# Patient Record
Sex: FEMALE | Race: WHITE | Employment: OTHER | URBAN - METROPOLITAN AREA
[De-identification: names, ages, dates, MRNs, and addresses within clinical notes are randomized per-mention and may not be internally consistent; named-entity substitution may affect disease eponyms.]

---

## 2022-03-22 ENCOUNTER — TRANSCRIBE ORDER (OUTPATIENT)
Dept: SCHEDULING | Age: 87
End: 2022-03-22

## 2022-03-22 DIAGNOSIS — R19.00 ABDOMINAL MASS: Primary | ICD-10-CM

## 2022-03-29 ENCOUNTER — HOSPITAL ENCOUNTER (OUTPATIENT)
Dept: PET IMAGING | Age: 87
Discharge: HOME | End: 2022-03-29
Payer: MEDICARE

## 2022-03-29 DIAGNOSIS — R19.00 ABDOMINAL MASS: ICD-10-CM

## 2022-03-29 LAB
GLUCOSE BLD STRIP.AUTO-MCNC: 93 MG/DL (ref 65–100)
SERVICE CMNT-IMP: NORMAL

## 2022-03-29 PROCEDURE — A9552 F18 FDG: HCPCS

## 2022-03-29 PROCEDURE — 82962 GLUCOSE BLOOD TEST: CPT

## 2022-03-29 PROCEDURE — 74011000636 HC RX REV CODE- 636: Performed by: INTERNAL MEDICINE

## 2022-03-29 RX ORDER — SODIUM CHLORIDE 0.9 % (FLUSH) 0.9 %
10 SYRINGE (ML) INJECTION
Status: COMPLETED | OUTPATIENT
Start: 2022-03-29 | End: 2022-03-29

## 2022-03-29 RX ORDER — FLUDEOXYGLUCOSE F18 300 MCI/ML
10 INJECTION INTRAVENOUS ONCE
Status: COMPLETED | OUTPATIENT
Start: 2022-03-29 | End: 2022-03-29

## 2022-03-29 RX ADMIN — Medication 10 ML: at 13:36

## 2022-03-29 RX ADMIN — DIATRIZOATE MEGLUMINE AND DIATRIZOATE SODIUM 10 ML: 660; 100 LIQUID ORAL; RECTAL at 13:36

## 2022-03-29 RX ADMIN — FLUDEOXYGLUCOSE F18 14.44 MILLICURIE: 300 INJECTION INTRAVENOUS at 13:36

## 2022-04-12 ENCOUNTER — HOSPITAL ENCOUNTER (OUTPATIENT)
Dept: LAB | Age: 87
Discharge: HOME | End: 2022-04-12
Payer: MEDICARE

## 2022-04-12 DIAGNOSIS — R19.00 ABDOMINAL MASS, UNSPECIFIED ABDOMINAL LOCATION: ICD-10-CM

## 2022-04-12 LAB
ALBUMIN SERPL-MCNC: 2.3 G/DL (ref 3.2–4.6)
ALBUMIN/GLOB SERPL: 0.3 {RATIO} (ref 1.2–3.5)
ALP SERPL-CCNC: 139 U/L (ref 50–136)
ALT SERPL-CCNC: 20 U/L (ref 12–65)
ANION GAP SERPL CALC-SCNC: 6 MMOL/L (ref 7–16)
AST SERPL-CCNC: 18 U/L (ref 15–37)
BASOPHILS # BLD: 0.1 K/UL (ref 0–0.2)
BASOPHILS NFR BLD: 0 % (ref 0–2)
BILIRUB SERPL-MCNC: 0.4 MG/DL (ref 0.2–1.1)
BUN SERPL-MCNC: 41 MG/DL (ref 8–23)
CALCIUM SERPL-MCNC: 10.6 MG/DL (ref 8.3–10.4)
CHLORIDE SERPL-SCNC: 102 MMOL/L (ref 98–107)
CO2 SERPL-SCNC: 23 MMOL/L (ref 21–32)
CREAT SERPL-MCNC: 1.9 MG/DL (ref 0.6–1)
CRP SERPL-MCNC: 16.4 MG/DL (ref 0–0.9)
DIFFERENTIAL METHOD BLD: ABNORMAL
EOSINOPHIL # BLD: 0 K/UL (ref 0–0.8)
EOSINOPHIL NFR BLD: 0 % (ref 0.5–7.8)
ERYTHROCYTE [DISTWIDTH] IN BLOOD BY AUTOMATED COUNT: 15.4 % (ref 11.9–14.6)
ERYTHROCYTE [SEDIMENTATION RATE] IN BLOOD: 60 MM/HR (ref 0–30)
GLOBULIN SER CALC-MCNC: 7 G/DL (ref 2.3–3.5)
GLUCOSE SERPL-MCNC: 108 MG/DL (ref 65–100)
HCT VFR BLD AUTO: 35.8 % (ref 35.8–46.3)
HGB BLD-MCNC: 11.2 G/DL (ref 11.7–15.4)
IMM GRANULOCYTES # BLD AUTO: 0.3 K/UL (ref 0–0.5)
IMM GRANULOCYTES NFR BLD AUTO: 2 % (ref 0–5)
LYMPHOCYTES # BLD: 3.2 K/UL (ref 0.5–4.6)
LYMPHOCYTES NFR BLD: 16 % (ref 13–44)
MCH RBC QN AUTO: 28.2 PG (ref 26.1–32.9)
MCHC RBC AUTO-ENTMCNC: 31.3 G/DL (ref 31.4–35)
MCV RBC AUTO: 90.2 FL (ref 79.6–97.8)
MONOCYTES # BLD: 1.5 K/UL (ref 0.1–1.3)
MONOCYTES NFR BLD: 7 % (ref 4–12)
NEUTS SEG # BLD: 15.3 K/UL (ref 1.7–8.2)
NEUTS SEG NFR BLD: 75 % (ref 43–78)
NRBC # BLD: 0 K/UL (ref 0–0.2)
PLATELET # BLD AUTO: 437 K/UL (ref 150–450)
PMV BLD AUTO: 8.8 FL (ref 9.4–12.3)
POTASSIUM SERPL-SCNC: 4.6 MMOL/L (ref 3.5–5.1)
PROT SERPL-MCNC: 9.3 G/DL (ref 6.3–8.2)
RBC # BLD AUTO: 3.97 M/UL (ref 4.05–5.2)
SODIUM SERPL-SCNC: 131 MMOL/L (ref 136–145)
WBC # BLD AUTO: 20.3 K/UL (ref 4.3–11.1)

## 2022-04-12 PROCEDURE — 80053 COMPREHEN METABOLIC PANEL: CPT

## 2022-04-12 PROCEDURE — 85025 COMPLETE CBC W/AUTO DIFF WBC: CPT

## 2022-04-12 PROCEDURE — 36415 COLL VENOUS BLD VENIPUNCTURE: CPT

## 2022-04-12 PROCEDURE — 86140 C-REACTIVE PROTEIN: CPT

## 2022-04-12 PROCEDURE — 85652 RBC SED RATE AUTOMATED: CPT

## 2022-04-14 LAB
FLOW CYTOMETRY, FBTC1: NORMAL
SPECIMEN SOURCE: NORMAL
TEST ORDERED:: NORMAL

## 2022-04-28 ENCOUNTER — HOSPITAL ENCOUNTER (OUTPATIENT)
Dept: CT IMAGING | Age: 87
Discharge: HOME | End: 2022-04-28
Attending: INTERNAL MEDICINE
Payer: MEDICARE

## 2022-04-28 ENCOUNTER — HOSPITAL ENCOUNTER (OUTPATIENT)
Dept: ULTRASOUND IMAGING | Age: 87
Discharge: HOME | End: 2022-04-28
Attending: INTERNAL MEDICINE
Payer: MEDICARE

## 2022-04-28 VITALS
SYSTOLIC BLOOD PRESSURE: 112 MMHG | DIASTOLIC BLOOD PRESSURE: 53 MMHG | BODY MASS INDEX: 30.48 KG/M2 | WEIGHT: 172 LBS | HEIGHT: 63 IN | OXYGEN SATURATION: 94 % | RESPIRATION RATE: 16 BRPM | HEART RATE: 70 BPM

## 2022-04-28 VITALS
OXYGEN SATURATION: 96 % | SYSTOLIC BLOOD PRESSURE: 123 MMHG | HEART RATE: 86 BPM | DIASTOLIC BLOOD PRESSURE: 56 MMHG | RESPIRATION RATE: 18 BRPM | TEMPERATURE: 97.9 F

## 2022-04-28 DIAGNOSIS — R19.00 ABDOMINAL MASS, UNSPECIFIED ABDOMINAL LOCATION: ICD-10-CM

## 2022-04-28 LAB
GLUCOSE BLD STRIP.AUTO-MCNC: 130 MG/DL (ref 65–100)
SERVICE CMNT-IMP: ABNORMAL

## 2022-04-28 PROCEDURE — 82962 GLUCOSE BLOOD TEST: CPT

## 2022-04-28 PROCEDURE — 88185 FLOWCYTOMETRY/TC ADD-ON: CPT

## 2022-04-28 PROCEDURE — 88307 TISSUE EXAM BY PATHOLOGIST: CPT

## 2022-04-28 PROCEDURE — 74011250636 HC RX REV CODE- 250/636: Performed by: RADIOLOGY

## 2022-04-28 PROCEDURE — 99152 MOD SED SAME PHYS/QHP 5/>YRS: CPT

## 2022-04-28 PROCEDURE — 74011000250 HC RX REV CODE- 250: Performed by: RADIOLOGY

## 2022-04-28 PROCEDURE — 88184 FLOWCYTOMETRY/ TC 1 MARKER: CPT

## 2022-04-28 PROCEDURE — 49180 BIOPSY ABDOMINAL MASS: CPT

## 2022-04-28 RX ORDER — MIDAZOLAM HYDROCHLORIDE 1 MG/ML
.5-2 INJECTION, SOLUTION INTRAMUSCULAR; INTRAVENOUS
Status: DISCONTINUED | OUTPATIENT
Start: 2022-04-28 | End: 2022-05-02 | Stop reason: HOSPADM

## 2022-04-28 RX ORDER — SODIUM CHLORIDE 9 MG/ML
25 INJECTION, SOLUTION INTRAVENOUS ONCE
Status: COMPLETED | OUTPATIENT
Start: 2022-04-28 | End: 2022-04-28

## 2022-04-28 RX ORDER — LIDOCAINE HYDROCHLORIDE 20 MG/ML
1-10 INJECTION, SOLUTION INFILTRATION; PERINEURAL
Status: DISCONTINUED | OUTPATIENT
Start: 2022-04-28 | End: 2022-05-02 | Stop reason: HOSPADM

## 2022-04-28 RX ORDER — FENTANYL CITRATE 50 UG/ML
25-100 INJECTION, SOLUTION INTRAMUSCULAR; INTRAVENOUS
Status: DISCONTINUED | OUTPATIENT
Start: 2022-04-28 | End: 2022-05-02 | Stop reason: HOSPADM

## 2022-04-28 RX ADMIN — FENTANYL CITRATE 25 MCG: 50 INJECTION INTRAMUSCULAR; INTRAVENOUS at 09:45

## 2022-04-28 RX ADMIN — MIDAZOLAM 0.5 MG: 1 INJECTION INTRAMUSCULAR; INTRAVENOUS at 09:45

## 2022-04-28 RX ADMIN — SODIUM CHLORIDE 25 ML/HR: 9 INJECTION, SOLUTION INTRAVENOUS at 09:33

## 2022-04-28 RX ADMIN — MIDAZOLAM 0.5 MG: 1 INJECTION INTRAMUSCULAR; INTRAVENOUS at 09:33

## 2022-04-28 RX ADMIN — FENTANYL CITRATE 25 MCG: 50 INJECTION INTRAMUSCULAR; INTRAVENOUS at 09:39

## 2022-04-28 RX ADMIN — LIDOCAINE HYDROCHLORIDE 200 MG: 20 INJECTION, SOLUTION INFILTRATION; PERINEURAL at 09:46

## 2022-04-28 RX ADMIN — FENTANYL CITRATE 25 MCG: 50 INJECTION INTRAMUSCULAR; INTRAVENOUS at 09:33

## 2022-04-28 RX ADMIN — MIDAZOLAM 0.5 MG: 1 INJECTION INTRAMUSCULAR; INTRAVENOUS at 09:39

## 2022-04-28 NOTE — H&P
History and Physical    Patient: Haven Vila MRN: [id number]  SSN: [id number]    YOB: 1935  Age: 82 y.o. Sex: female      Subjective:      Haven Vila is a 82 y.o. female who has multiple abdominal masses. Needs biopsy for diagnosis. NPO . Past Medical History:   Diagnosis Date    [facility]chronic obstructive [facility]) [facility] [address]     Depression     Diabetes [facility]     Hypertension      Past Surgical History:   Procedure Laterality Date    HX ANKLE FRACTURE TX      ankle broken      Family History   Problem Relation Age of Onset    Colon Cancer Sister     Cancer Sister         Bladder    Cancer Brother         bladder     Social History     Tobacco Use    Smoking status: Former Smoker    Smokeless tobacco: Never Used   Substance Use Topics    Alcohol use: Never      Prior to Admission medications    Medication Sig Start Date End Date Taking? Authorizing Provider   magnesium 250 mg tab Take 1 Tablet by mouth daily. Provider, Historical   Ventolin HFA 90 mcg/actuation inhaler INHALE 2 PUFFS BY MOUTH EVERY 6 HOURS AS NEEDED 3/16/22   Provider, Historical   aspirin 81 mg chewable tablet Take 81 mg by mouth daily. 2/15/22 2/15/23  Provider, Historical   calcium-vitamin D 600 mg-5 mcg (200 unit) tab Take 2 Tablets by mouth daily. Provider, Historical   cyanocobalamin (VITAMIN B12) 500 mcg tablet Take 500 mcg by mouth daily. Provider, Historical   docosahexanoic acid-eicosapent 120-180 mg cap Take 1 Capsule by mouth daily. Provider, Historical   glimepiride (AMARYL) 1 mg tablet Take 1 mg by mouth daily. 11/16/21 11/16/22  Provider, Historical   ibuprofen (MOTRIN) 200 mg tablet Take 3 Tablets by mouth as needed. Provider, Historical   lisinopril-hydroCHLOROthiazide (PRINZIDE, ZESTORETIC) 10-12.5 mg per tablet Take 1 Tablet by mouth daily.  11/16/21 11/16/22  Provider, Historical   Anoro Ellipta 62.5-25 mcg/actuation inhaler INHALE 1 PUFF BY MOUTH EVERY DAY 3/15/22 Provider, Historical   LORazepam (ATIVAN) 0.5 mg tablet Take 1 Tablet by mouth every eight (8) hours as needed for Anxiety. Max Daily Amount: 1.5 mg. 4/12/22   Anushka Robison MD        Allergies   Allergen Reactions    Metformin Other (comments)     GI issues    Penicillins Swelling       Review of Systems:  Pertinent items are noted in the History of Present Illness. Objective:     Vitals:    04/21/22 1226 04/28/22 0839   BP:  (!) 145/72   Pulse:  89   Resp:  16   Temp:  97.9 °F (36.6 °C)   SpO2: (S) (!) 2% 98%        Physical Exam:  LUNG: clear to auscultation bilaterally  HEART: regular rate and rhythm    Assessment:     Hospital Problems  Date Reviewed: 4/22/2022    None          Plan:     Image guided abdominal mass biopsy. The previously described pelvic lesion, which may contain gas is not amenable to percutaneous biopsy. Moderate sedation.     Signed By: Nilesh Rahman MD     April 28, 2022

## 2022-04-28 NOTE — PROCEDURES
Department of Interventional Radiology  [phone]        Interventional Radiology Brief Procedure Note    Patient: Saul Hutchins MRN: [id number]  SSN: [id number]    YOB: 1935  Age: 82 y.o. Sex: female      Date of Procedure: 4/28/2022    Pre-Procedure Diagnosis: abdominal mass    Post-Procedure Diagnosis: SAME    Procedure(s): Image Guided Biopsy    Brief Description of Procedure: as above    Performed By: Dia Monroy MD     Assistants: None    Anesthesia:Moderate Sedation    Estimated Blood Loss: Less than 10ml    Specimens:  Pathology    Implants:  None    Findings: upper abdominal mass    Complications: None    Recommendations: routine post care     Follow Up: as needed.   The pelvic gas and fluid collection is not accessible for biopsy or aspiration    Signed By: Dia Monroy MD     April 28, 2022

## 2022-04-28 NOTE — PROGRESS NOTES
TRANSFER - OUT REPORT:    Verbal report given to Latisha Knowles RN(name) on Haven Vila  being transferred to IR Recovery 5(unit) for routine post - op       Report consisted of patients Situation, Background, Assessment and   Recommendations(SBAR). Information from the following report(s) SBAR, Procedure Summary and MAR was reviewed with the receiving nurse. Opportunity for questions and clarification was provided. Conscious Sedation:   75 Mcg of Fentanyl administered  1.5 Mg of Versed administered    Pt tolerated procedure well.      Visit Vitals  BP (!) 123/56   Pulse 86   Temp 97.9 °F (36.6 °C)   Resp 18   SpO2 96%     Past Medical History:   Diagnosis Date    [facility]chronic obstructive [facility] disease) ([facility] [address] [facility]     Depression     Diabetes ([facility] [address] [facility]     Hypertension      Peripheral IV 04/28/22 Right Antecubital (Active)

## 2022-04-28 NOTE — DISCHARGE INSTRUCTIONS
[address]  Department of Interventional Radiology  [facility]  [phone] Office  [phone] Fax    BIOPSY DISCHARGE INSTRUCTIONS    General Instructions:     A biopsy is the removal of a small piece of tissue for microscopic examination or testing. Healthy tissue can be obtained for the purpose of tissue-type matching for transplants. Unhealthy tissues are more commonly biopsied to diagnose disease. Lung Biopsy:     A needle lung biopsy is performed when there is a mass discovered in the lung area. The most serious risk is infection, bleeding, and pneumothorax (a collapsed lung). Signs of pneumothorax include shortness of breath, rapid heart rate, and blueness of the skin. If any of these occur, call 911. Liver Biopsy: This test helps detect cancer, infections, and the cause of an enlargement of the liver or elevated liver enzymes. It also helps to diagnose a number of liver diseases. The pain associated with the procedure may be felt in the shoulder. The risks include internal bleeding, pneumothorax, and injury to the surrounding organs. Renal Biopsy: This procedure is sometimes done to evaluate a transplanted kidney. It is also used to evaluate unexplained decrease in kidney function, blood, or protein in the urine. You may see bright red blood in the urine the first 24 hours after the test. If the bleeding lasts longer, you need to call your doctor. There is a risk of infection and bleeding into the muscle, which may cause soreness. Spinal Biopsy: This test is sometimes done in conjunction with other procedures. Your back will be sore, as we are taking a small sample of bone, which is slightly more difficult to sample than tissue. General Biopsy:     A mass can grow in any area of the body, and we are taking a specimen as ordered by your doctor. The risks are the same.  They include bleeding, pain, and infection. Home Care Instructions: You may resume your regular diet and medication regimen. Do not drink alcohol, drive, or make any important legal decisions in the next 24 hours. Do not lift anything heavier than a gallon of milk until the soreness goes away. You may use over the counter acetaminophen or ibuprofen for the soreness. You may apply an ice pack to the affected area for 20-30 minutes at time for the first 24 hours. After that, you may apply a heat pack. Call If: You should call your Physician and/or the Radiology Nurse if you have any questions or concerns about the biopsy site. Call if you should have increased pain, fever, redness, drainage, or bleeding more than a small spot on the bandage. Follow-Up Instructions: Please see your ordering doctor as he/she has requested. To Reach Us: If you have any questions about your procedure, please call the Interventional Radiology department at [phone]. After business hours (5pm) and weekends, call the answering service at [phone] and ask for the Radiologist on call to be paged. Si tiene Preguntas acerca del procedimiento, por favor llame al departamento de Radiología Intervencional al [phone]. Después de horas de oficina (5 pm) y los fines de [place], llamar al [facility] al [phone] y pregunte por el Radiologo de [facility]. Interventional Radiology General Nurse Discharge    After general anesthesia or intravenous sedation, for 24 hours or while taking prescription Narcotics:  · Limit your activities  · Do not drive and operate hazardous machinery  · Do not make important personal or business decisions  · Do  not drink alcoholic beverages  · If you have not urinated within 8 hours after discharge, please contact your surgeon on call. * Please give a list of your current medications to your Primary Care Provider.   * Please update this list whenever your medications are discontinued, doses are changed, or new medications (including over-the-counter products) are added. * Please carry medication information at all times in case of emergency situations. These are general instructions for a healthy lifestyle:    No smoking/ No tobacco products/ Avoid exposure to second hand smoke  Surgeon General's Warning:  Quitting smoking now greatly reduces serious risk to your health. Obesity, smoking, and sedentary lifestyle greatly increases your risk for illness  A healthy diet, regular physical exercise & weight monitoring are important for maintaining a healthy lifestyle    You may be retaining fluid if you have a history of heart failure or if you experience any of the following symptoms:  Weight gain of 3 pounds or more overnight or 5 pounds in a week, increased swelling in our hands or feet or shortness of breath while lying flat in bed. Please call your doctor as soon as you notice any of these symptoms; do not wait until your next office visit. Recognize signs and symptoms of STROKE:  F-face looks uneven    A-arms unable to move or move unevenly    S-speech slurred or non-existent    T-time-call 911 as soon as signs and symptoms begin-DO NOT go       Back to bed or wait to see if you get better-TIME IS BRAIN.

## 2022-05-03 ENCOUNTER — HOSPITAL ENCOUNTER (OUTPATIENT)
Dept: LAB | Age: 87
Discharge: HOME | End: 2022-05-03
Payer: MEDICARE

## 2022-05-03 DIAGNOSIS — R19.00 PELVIC MASS IN FEMALE: ICD-10-CM

## 2022-05-03 LAB
ALBUMIN SERPL-MCNC: 3 G/DL (ref 3.2–4.6)
ALBUMIN/GLOB SERPL: 0.5 {RATIO} (ref 1.2–3.5)
ALP SERPL-CCNC: 106 U/L (ref 50–136)
ALT SERPL-CCNC: 25 U/L (ref 12–65)
ANION GAP SERPL CALC-SCNC: 4 MMOL/L (ref 7–16)
AST SERPL-CCNC: 27 U/L (ref 15–37)
BASOPHILS # BLD: 0.1 K/UL (ref 0–0.2)
BASOPHILS NFR BLD: 1 % (ref 0–2)
BILIRUB SERPL-MCNC: 0.5 MG/DL (ref 0.2–1.1)
BUN SERPL-MCNC: 28 MG/DL (ref 8–23)
CALCIUM SERPL-MCNC: 9.5 MG/DL (ref 8.3–10.4)
CHLORIDE SERPL-SCNC: 98 MMOL/L (ref 98–107)
CO2 SERPL-SCNC: 29 MMOL/L (ref 21–32)
CREAT SERPL-MCNC: 1.4 MG/DL (ref 0.6–1)
DIFFERENTIAL METHOD BLD: ABNORMAL
EOSINOPHIL # BLD: 0.3 K/UL (ref 0–0.8)
EOSINOPHIL NFR BLD: 3 % (ref 0.5–7.8)
ERYTHROCYTE [DISTWIDTH] IN BLOOD BY AUTOMATED COUNT: 17.2 % (ref 11.9–14.6)
GLOBULIN SER CALC-MCNC: 6.4 G/DL (ref 2.3–3.5)
GLUCOSE SERPL-MCNC: 77 MG/DL (ref 65–100)
HCT VFR BLD AUTO: 36.6 % (ref 35.8–46.3)
HGB BLD-MCNC: 11.4 G/DL (ref 11.7–15.4)
IMM GRANULOCYTES # BLD AUTO: 0.1 K/UL (ref 0–0.5)
IMM GRANULOCYTES NFR BLD AUTO: 1 % (ref 0–5)
LYMPHOCYTES # BLD: 3.2 K/UL (ref 0.5–4.6)
LYMPHOCYTES NFR BLD: 35 % (ref 13–44)
MCH RBC QN AUTO: 28.9 PG (ref 26.1–32.9)
MCHC RBC AUTO-ENTMCNC: 31.1 G/DL (ref 31.4–35)
MCV RBC AUTO: 92.9 FL (ref 79.6–97.8)
MONOCYTES # BLD: 0.9 K/UL (ref 0.1–1.3)
MONOCYTES NFR BLD: 10 % (ref 4–12)
NEUTS SEG # BLD: 4.8 K/UL (ref 1.7–8.2)
NEUTS SEG NFR BLD: 50 % (ref 43–78)
NRBC # BLD: 0 K/UL (ref 0–0.2)
PLATELET # BLD AUTO: 284 K/UL (ref 150–450)
PMV BLD AUTO: 8.6 FL (ref 9.4–12.3)
POTASSIUM SERPL-SCNC: 4.3 MMOL/L (ref 3.5–5.1)
PROT SERPL-MCNC: 9.4 G/DL (ref 6.3–8.2)
RBC # BLD AUTO: 3.94 M/UL (ref 4.05–5.2)
SODIUM SERPL-SCNC: 131 MMOL/L (ref 136–145)
WBC # BLD AUTO: 9.2 K/UL (ref 4.3–11.1)

## 2022-05-03 PROCEDURE — 80053 COMPREHEN METABOLIC PANEL: CPT

## 2022-05-03 PROCEDURE — 85025 COMPLETE CBC W/AUTO DIFF WBC: CPT

## 2022-05-03 PROCEDURE — 36415 COLL VENOUS BLD VENIPUNCTURE: CPT

## 2022-05-04 ENCOUNTER — HOSPITAL ENCOUNTER (OUTPATIENT)
Dept: LAB | Age: 87
Discharge: HOME | End: 2022-05-04
Payer: MEDICARE

## 2022-05-04 DIAGNOSIS — D68.8 OTHER SPECIFIED COAGULATION DEFECTS (HCC): ICD-10-CM

## 2022-05-04 DIAGNOSIS — Z01.812 PRE-PROCEDURE LAB EXAM: ICD-10-CM

## 2022-05-04 PROBLEM — C54.9: Status: ACTIVE | Noted: 2022-05-04

## 2022-05-04 LAB
APTT PPP: 34.6 SEC (ref 24.1–35.1)
FLOW CYTOMETRY, FBTC1: NORMAL
INR PPP: 1
PROTHROMBIN TIME: 13.5 SEC (ref 12.6–14.5)
SPECIMEN SOURCE: NORMAL
TEST ORDERED:: NORMAL

## 2022-05-04 PROCEDURE — 85730 THROMBOPLASTIN TIME PARTIAL: CPT

## 2022-05-04 PROCEDURE — 85610 PROTHROMBIN TIME: CPT

## 2022-05-04 PROCEDURE — 36415 COLL VENOUS BLD VENIPUNCTURE: CPT

## 2022-05-10 ENCOUNTER — ANESTHESIA EVENT (OUTPATIENT)
Dept: INTERVENTIONAL RADIOLOGY/VASCULAR | Age: 87
End: 2022-05-10
Payer: MEDICARE

## 2022-05-10 RX ORDER — ONDANSETRON 2 MG/ML
4 INJECTION INTRAMUSCULAR; INTRAVENOUS
Status: CANCELLED | OUTPATIENT
Start: 2022-05-10 | End: 2022-05-11

## 2022-05-10 RX ORDER — SODIUM CHLORIDE, SODIUM LACTATE, POTASSIUM CHLORIDE, CALCIUM CHLORIDE 600; 310; 30; 20 MG/100ML; MG/100ML; MG/100ML; MG/100ML
100 INJECTION, SOLUTION INTRAVENOUS CONTINUOUS
Status: CANCELLED | OUTPATIENT
Start: 2022-05-10 | End: 2022-05-11

## 2022-05-10 RX ORDER — LIDOCAINE HYDROCHLORIDE 10 MG/ML
0.1 INJECTION INFILTRATION; PERINEURAL AS NEEDED
Status: CANCELLED | OUTPATIENT
Start: 2022-05-10

## 2022-05-10 RX ORDER — SODIUM CHLORIDE 0.9 % (FLUSH) 0.9 %
5-40 SYRINGE (ML) INJECTION EVERY 8 HOURS
Status: CANCELLED | OUTPATIENT
Start: 2022-05-10

## 2022-05-10 RX ORDER — OXYCODONE HYDROCHLORIDE 5 MG/1
5 TABLET ORAL
Status: CANCELLED | OUTPATIENT
Start: 2022-05-10

## 2022-05-10 RX ORDER — FLUMAZENIL 0.1 MG/ML
0.2 INJECTION INTRAVENOUS
Status: CANCELLED | OUTPATIENT
Start: 2022-05-10

## 2022-05-10 RX ORDER — SODIUM CHLORIDE 0.9 % (FLUSH) 0.9 %
5-40 SYRINGE (ML) INJECTION AS NEEDED
Status: CANCELLED | OUTPATIENT
Start: 2022-05-10

## 2022-05-10 RX ORDER — HYDROMORPHONE HYDROCHLORIDE 2 MG/ML
0.5 INJECTION, SOLUTION INTRAMUSCULAR; INTRAVENOUS; SUBCUTANEOUS
Status: CANCELLED | OUTPATIENT
Start: 2022-05-10

## 2022-05-10 RX ORDER — TRISODIUM CITRATE DIHYDRATE AND CITRIC ACID MONOHYDRATE 500; 334 MG/5ML; MG/5ML
30 SOLUTION ORAL
Status: CANCELLED | OUTPATIENT
Start: 2022-05-10 | End: 2022-05-11

## 2022-05-10 RX ORDER — NALOXONE HYDROCHLORIDE 0.4 MG/ML
0.2 INJECTION, SOLUTION INTRAMUSCULAR; INTRAVENOUS; SUBCUTANEOUS AS NEEDED
Status: CANCELLED | OUTPATIENT
Start: 2022-05-10

## 2022-05-10 RX ORDER — MIDAZOLAM HYDROCHLORIDE 1 MG/ML
2 INJECTION, SOLUTION INTRAMUSCULAR; INTRAVENOUS
Status: CANCELLED | OUTPATIENT
Start: 2022-05-10 | End: 2022-05-11

## 2022-05-10 RX ORDER — SODIUM CHLORIDE, SODIUM LACTATE, POTASSIUM CHLORIDE, CALCIUM CHLORIDE 600; 310; 30; 20 MG/100ML; MG/100ML; MG/100ML; MG/100ML
75 INJECTION, SOLUTION INTRAVENOUS CONTINUOUS
Status: CANCELLED | OUTPATIENT
Start: 2022-05-10

## 2022-05-10 RX ORDER — ACETAMINOPHEN 325 MG/1
650 TABLET ORAL ONCE
Status: CANCELLED | OUTPATIENT
Start: 2022-05-10 | End: 2022-05-10

## 2022-05-10 RX ORDER — ALBUTEROL SULFATE 0.83 MG/ML
2.5 SOLUTION RESPIRATORY (INHALATION)
Status: CANCELLED | OUTPATIENT
Start: 2022-05-10

## 2022-05-11 ENCOUNTER — HOSPITAL ENCOUNTER (OUTPATIENT)
Dept: INTERVENTIONAL RADIOLOGY/VASCULAR | Age: 87
Discharge: HOME | End: 2022-05-11
Attending: OBSTETRICS & GYNECOLOGY
Payer: MEDICARE

## 2022-05-11 ENCOUNTER — ANESTHESIA (OUTPATIENT)
Dept: INTERVENTIONAL RADIOLOGY/VASCULAR | Age: 87
End: 2022-05-11
Payer: MEDICARE

## 2022-05-11 VITALS
WEIGHT: 173.5 LBS | RESPIRATION RATE: 16 BRPM | OXYGEN SATURATION: 98 % | DIASTOLIC BLOOD PRESSURE: 57 MMHG | TEMPERATURE: 98 F | BODY MASS INDEX: 30.74 KG/M2 | SYSTOLIC BLOOD PRESSURE: 142 MMHG | HEART RATE: 86 BPM | HEIGHT: 63 IN

## 2022-05-11 DIAGNOSIS — C54.9 MIXED MULLERIAN TUMOR (HCC): ICD-10-CM

## 2022-05-11 PROCEDURE — 36561 INSERT TUNNELED CV CATH: CPT

## 2022-05-11 PROCEDURE — C1894 INTRO/SHEATH, NON-LASER: HCPCS

## 2022-05-11 PROCEDURE — 74011250636 HC RX REV CODE- 250/636: Performed by: NURSE ANESTHETIST, CERTIFIED REGISTERED

## 2022-05-11 PROCEDURE — 77030010507 HC ADH SKN DERMBND J&J -B

## 2022-05-11 PROCEDURE — 77030031139 HC SUT VCRL2 J&J -A

## 2022-05-11 PROCEDURE — 74011000250 HC RX REV CODE- 250: Performed by: NURSE ANESTHETIST, CERTIFIED REGISTERED

## 2022-05-11 PROCEDURE — 74011250636 HC RX REV CODE- 250/636: Performed by: PHYSICIAN ASSISTANT

## 2022-05-11 PROCEDURE — 77030031131 HC SUT MXN P COVD -B

## 2022-05-11 PROCEDURE — 74011000250 HC RX REV CODE- 250: Performed by: PHYSICIAN ASSISTANT

## 2022-05-11 PROCEDURE — 74011000258 HC RX REV CODE- 258: Performed by: NURSE ANESTHETIST, CERTIFIED REGISTERED

## 2022-05-11 PROCEDURE — C1788 PORT, INDWELLING, IMP: HCPCS

## 2022-05-11 PROCEDURE — 76060000032 HC ANESTHESIA 0.5 TO 1 HR

## 2022-05-11 RX ORDER — LIDOCAINE HYDROCHLORIDE 20 MG/ML
INJECTION, SOLUTION EPIDURAL; INFILTRATION; INTRACAUDAL; PERINEURAL AS NEEDED
Status: DISCONTINUED | OUTPATIENT
Start: 2022-05-11 | End: 2022-05-11 | Stop reason: HOSPADM

## 2022-05-11 RX ORDER — LIDOCAINE HYDROCHLORIDE AND EPINEPHRINE 20; 10 MG/ML; UG/ML
1-20 INJECTION, SOLUTION INFILTRATION; PERINEURAL
Status: DISCONTINUED | OUTPATIENT
Start: 2022-05-11 | End: 2022-05-15 | Stop reason: HOSPADM

## 2022-05-11 RX ORDER — SODIUM CHLORIDE, SODIUM LACTATE, POTASSIUM CHLORIDE, CALCIUM CHLORIDE 600; 310; 30; 20 MG/100ML; MG/100ML; MG/100ML; MG/100ML
INJECTION, SOLUTION INTRAVENOUS
Status: DISCONTINUED | OUTPATIENT
Start: 2022-05-11 | End: 2022-05-11 | Stop reason: HOSPADM

## 2022-05-11 RX ORDER — HEPARIN 100 UNIT/ML
500 SYRINGE INTRAVENOUS ONCE
Status: COMPLETED | OUTPATIENT
Start: 2022-05-11 | End: 2022-05-11

## 2022-05-11 RX ORDER — CLINDAMYCIN PHOSPHATE 900 MG/50ML
900 INJECTION INTRAVENOUS ONCE
Status: ACTIVE | OUTPATIENT
Start: 2022-05-11 | End: 2022-05-11

## 2022-05-11 RX ORDER — PROPOFOL 10 MG/ML
INJECTION, EMULSION INTRAVENOUS AS NEEDED
Status: DISCONTINUED | OUTPATIENT
Start: 2022-05-11 | End: 2022-05-11 | Stop reason: HOSPADM

## 2022-05-11 RX ADMIN — SODIUM CHLORIDE, SODIUM LACTATE, POTASSIUM CHLORIDE, AND CALCIUM CHLORIDE: 600; 310; 30; 20 INJECTION, SOLUTION INTRAVENOUS at 11:42

## 2022-05-11 RX ADMIN — LIDOCAINE HYDROCHLORIDE 30 MG: 20 INJECTION, SOLUTION EPIDURAL; INFILTRATION; INTRACAUDAL; PERINEURAL at 11:48

## 2022-05-11 RX ADMIN — PROPOFOL 30 MG: 10 INJECTION, EMULSION INTRAVENOUS at 11:48

## 2022-05-11 RX ADMIN — HEPARIN 500 UNITS: 100 SYRINGE at 12:05

## 2022-05-11 RX ADMIN — PROPOFOL 75 MCG/KG/MIN: 10 INJECTION, EMULSION INTRAVENOUS at 11:49

## 2022-05-11 RX ADMIN — SODIUM CHLORIDE 900 MG: 900 INJECTION, SOLUTION INTRAVENOUS at 11:51

## 2022-05-11 RX ADMIN — LIDOCAINE HYDROCHLORIDE,EPINEPHRINE BITARTRATE 200 MG: 20; .01 INJECTION, SOLUTION INFILTRATION; PERINEURAL at 11:56

## 2022-05-11 NOTE — ANESTHESIA PREPROCEDURE EVALUATION
Relevant Problems   No relevant active problems       Anesthetic History               Review of Systems / Medical History  Patient summary reviewed and pertinent labs reviewed    Pulmonary    COPD (O2 @ night)      Smoker (Former)         Neuro/Psych              Cardiovascular    Hypertension              Exercise tolerance: >4 METS     GI/Hepatic/Renal                Endo/Other    Diabetes: type 2         Other Findings            Physical Exam    Airway  Mallampati: II  TM Distance: > 6 cm  Neck ROM: normal range of motion   Mouth opening: Normal     Cardiovascular  Regular rate and rhythm,  S1 and S2 normal,  no murmur, click, rub, or gallop             Dental  No notable dental hx       Pulmonary  Breath sounds clear to auscultation               Abdominal         Other Findings            Anesthetic Plan    ASA: 3  Anesthesia type: total IV anesthesia            Anesthetic plan and risks discussed with: Patient

## 2022-05-11 NOTE — PROCEDURES
Department of Interventional Radiology  [phone]        Interventional Radiology Brief Procedure Note    Patient: Espinoza Gonzalez MRN: [id number]  SSN: [id number]    YOB: 1935  Age: 82 y.o.   Sex: female      Date of Procedure: 5/11/2022    Pre-Procedure Diagnosis: ovarian cancer    Post-Procedure Diagnosis: SAME    Procedure(s): Venous Chest Port Placement    Brief Description of Procedure: as above    Performed By: Pepe Lew PA-C     Assistants: None    Anesthesia:TIVS/MAC    Estimated Blood Loss: Less than 10ml    Specimens:  None    Implants:  Chest Port Placement    Findings: catheter tip in right atrium     Complications: None    Recommendations: ok to use port     Follow Up: prn    Signed By: Pepe Lew PA-C     May 11, 2022

## 2022-05-11 NOTE — DISCHARGE INSTRUCTIONS
[address]  Department of Interventional Radiology  [facility]  [phone] Office  [phone] Fax  Implanted Port Discharge Instructions      General Instructions:   A port is like an implanted IV. They are usually ordered for patients who will be getting chemotherapy, but can also be used as an IV for long term antibiotics, large amounts of fluids, and/or blood products. Your blood can be drawn from your port for labs also. Those patients who do not have good veins find the ports convenient as they can get the IV they need with one stick. The port can be used long term, and the care is easy. The device is under the skin, and once the skin heals, care is minimal. All that is required is the nurse who accesses the port will need to flush it with heparinized saline after each use. Ports are usually placed in the chest wall, usually on the right side. But they can be place in the arms and in the abdomen. Home Care Instructions: If your port is in your arm, do not allow blood pressure or other IVs to be place in that arm. Do not allow bra straps or any clothing to rub the skin over the port. Do not bathe or swim until the skin has healed and if the port is accessed. Once it is healed, and when the port is not accessed, it is okay to bathe and swim. Restrict yourself to light activity for the first 5 days after getting the port put in, after that, resume normal activity slowly. You may resume your normal diet and medications. Follow-Up Instructions: Please see your oncologist, or whatever physician ordered the port as he/she has requested of you. Call If: You should call your Physician and/or the Radiology Nurse if you notice redness, pus, swelling, or pain from the area of your incision. Call if you should develop a fever. The nurses who access your port will know to call your doctor if the port does not seem to be working properly. You need to tell the nurses who use the port if you should have any pain or swelling at the site during an infusion. To Reach Us: If you have any questions about your procedure, please call the Interventional Radiology department at [phone]. After business hours (5pm) and weekends, call the answering service at [phone] and ask for the Radiologist on call to be paged. Si tiene Preguntas acerca del procedimiento, por favor llame al departamento de Radiología Intervencional al [phone]. Después de horas de oficina (5 pm) y los fines de [place], llamar al [facility] al [phone] y pregunte por el Radiologo de [facility]. Interventional Radiology General Nurse Discharge    After general anesthesia or intravenous sedation, for 24 hours or while taking prescription Narcotics:  · Limit your activities  · Do not drive and operate hazardous machinery  · Do not make important personal or business decisions  · Do  not drink alcoholic beverages  · If you have not urinated within 8 hours after discharge, please contact your surgeon on call. * Please give a list of your current medications to your Primary Care Provider. * Please update this list whenever your medications are discontinued, doses are     changed, or new medications (including over-the-counter products) are added. * Please carry medication information at all times in case of emergency situations. These are general instructions for a healthy lifestyle:    No smoking/ No tobacco products/ Avoid exposure to second hand smoke  Surgeon General's Warning:  Quitting smoking now greatly reduces serious risk to your health.     Obesity, smoking, and sedentary lifestyle greatly increases your risk for illness  A healthy diet, regular physical exercise & weight monitoring are important for maintaining a healthy lifestyle    You may be retaining fluid if you have a history of heart failure or if you experience any of the following symptoms:  Weight gain of 3 pounds or more overnight or 5 pounds in a week, increased swelling in our hands or feet or shortness of breath while lying flat in bed. Please call your doctor as soon as you notice any of these symptoms; do not wait until your next office visit. Recognize signs and symptoms of STROKE:  F-face looks uneven    A-arms unable to move or move unevenly    S-speech slurred or non-existent    T-time-call 911 as soon as signs and symptoms begin-DO NOT go       Back to bed or wait to see if you get better-TIME IS BRAIN.

## 2022-05-11 NOTE — PROGRESS NOTES
Patient and family educated on power port using [employer] supplied materials. Opportunity for questions provided. Both verbalized understanding.

## 2022-05-11 NOTE — H&P
Department of Interventional Radiology  [phone]    History and Physical    Patient:  Karin Hammond MRN:  [id number]  SSN:  [id number]    YOB: 1935  Age:  82 y.o. Sex:  female      Primary Care Provider:  Gerry Deluca NP  Referring Physician:  Franki Leiva MD    Subjective:     Chief Complaint: ovarian cancer    History of the Present Illness: The patient is a 82 y.o. female who presents for venous chest port placement. Ovarian cancer, COPD.  NPO. Past Medical History:   Diagnosis Date    COPD (chronic obstructive pulmonary disease) ([facility] [address]     Depression     Diabetes ([facility] [address]     Hypertension      Past Surgical History:   Procedure Laterality Date    HX ANKLE FRACTURE TX      ankle broken        Review of Systems:    Pertinent items are noted in the History of Present Illness. Prior to Admission medications    Medication Sig Start Date End Date Taking? Authorizing Provider   traMADoL (ULTRAM) 50 mg tablet Take 1 Tablet by mouth every six (6) hours as needed for Pain for up to 30 days. Max Daily Amount: 200 mg. 5/3/22 6/2/22 Yes Brice Carrera MD   magnesium 250 mg tab Take 1 Tablet by mouth daily. Yes Provider, Historical   Ventolin HFA 90 mcg/actuation inhaler INHALE 2 PUFFS BY MOUTH EVERY 6 HOURS AS NEEDED 3/16/22  Yes Provider, Historical   aspirin 81 mg chewable tablet Take 81 mg by mouth daily. 2/15/22 2/15/23 Yes Provider, Historical   cyanocobalamin (VITAMIN B12) 500 mcg tablet Take 500 mcg by mouth daily. Yes Provider, Historical   docosahexanoic acid-eicosapent 120-180 mg cap Take 1 Capsule by mouth daily. Yes Provider, Historical   glimepiride (AMARYL) 1 mg tablet Take 1 mg by mouth daily. 11/16/21 11/16/22 Yes Provider, Historical   lisinopril-hydroCHLOROthiazide (PRINZIDE, ZESTORETIC) 10-12.5 mg per tablet Take 1 Tablet by mouth daily.  11/16/21 11/16/22 Yes Provider, Historical   Anoro Ellipta 62.5-25 mcg/actuation inhaler INHALE 1 PUFF BY MOUTH EVERY DAY 3/15/22  Yes Provider, Historical   LORazepam (ATIVAN) 0.5 mg tablet Take 1 Tablet by mouth every eight (8) hours as needed for Anxiety.  Max Daily Amount: 1.5 mg. 4/12/22  Yes Pedrito Espino MD        Allergies   Allergen Reactions    Metformin Other (comments)     GI issues    Penicillins Swelling       Family History   Problem Relation Age of Onset    Colon Cancer Sister     Cancer Sister         Bladder    Cancer Brother         bladder     Social History     Tobacco Use    Smoking status: Former Smoker    Smokeless tobacco: Never Used   Substance Use Topics    Alcohol use: Never        Objective:       Physical Examination:    Vitals:    05/11/22 1051   BP: (!) 129/91   Pulse: 80   Resp: 18   Temp: 97.7 °F (36.5 °C)   SpO2: 93%   Weight: 78.7 kg (173 lb 8 oz)   Height: 5' 2.5\" (1.588 m)       Pain Assessment  Pain Intensity 1: 0 (05/11/22 1051)               HEART: regular rate and rhythm  LUNG: clear to auscultation bilaterally  ABDOMEN: normal findings: soft, non-tender  EXTREMITIES: warm, no edema    Laboratory:     Lab Results   Component Value Date/Time    Sodium 131 (L) 05/03/2022 04:28 PM    Sodium 131 (L) 04/12/2022 03:30 PM    Potassium 4.3 05/03/2022 04:28 PM    Potassium 4.6 04/12/2022 03:30 PM    Chloride 98 05/03/2022 04:28 PM    Chloride 102 04/12/2022 03:30 PM    CO2 29 05/03/2022 04:28 PM    CO2 23 04/12/2022 03:30 PM    Anion gap 4 (L) 05/03/2022 04:28 PM    Anion gap 6 (L) 04/12/2022 03:30 PM    Glucose 77 05/03/2022 04:28 PM    Glucose 108 (H) 04/12/2022 03:30 PM    BUN 28 (H) 05/03/2022 04:28 PM    BUN 41 (H) 04/12/2022 03:30 PM    Creatinine 1.40 (H) 05/03/2022 04:28 PM    Creatinine 1.90 (H) 04/12/2022 03:30 PM    GFR est AA 46 (L) 05/03/2022 04:28 PM    GFR est AA 32 (L) 04/12/2022 03:30 PM    GFR est non-AA 38 (L) 05/03/2022 04:28 PM    GFR est non-AA 27 (L) 04/12/2022 03:30 PM    Calcium 9.5 05/03/2022 04:28 PM    Calcium 10.6 (H) 04/12/2022 03:30 PM    Albumin 3.0 (L) 05/03/2022 04:28 PM    Albumin 2.3 (L) 04/12/2022 03:30 PM    Protein, total 9.4 (H) 05/03/2022 04:28 PM    Protein, total 9.3 (H) 04/12/2022 03:30 PM    Globulin 6.4 (H) 05/03/2022 04:28 PM    Globulin 7.0 (H) 04/12/2022 03:30 PM    A-G Ratio 0.5 (L) 05/03/2022 04:28 PM    A-G Ratio 0.3 (L) 04/12/2022 03:30 PM    ALT (SGPT) 25 05/03/2022 04:28 PM    ALT (SGPT) 20 04/12/2022 03:30 PM     Lab Results   Component Value Date/Time    WBC 9.2 05/03/2022 04:28 PM    WBC 20.3 (H) 04/12/2022 03:30 PM    HGB 11.4 (L) 05/03/2022 04:28 PM    HGB 11.2 (L) 04/12/2022 03:30 PM    HCT 36.6 05/03/2022 04:28 PM    HCT 35.8 04/12/2022 03:30 PM    PLATELET 248 38/79/8357 04:28 PM    PLATELET 854 69/18/5823 03:30 PM     Lab Results   Component Value Date/Time    aPTT 34.6 05/04/2022 03:04 PM    Prothrombin time 13.5 05/04/2022 03:04 PM    INR 1.0 05/04/2022 03:04 PM       Assessment:     Ovarian cancer    Hospital Problems  Date Reviewed: 5/4/2022    None          Plan:     Planned Procedure:  Port placement    Risks, benefits, and alternatives reviewed with patient and she agrees to proceed with the procedure.       Signed By: Ankur Pineda PA-C     May 11, 2022

## 2022-05-11 NOTE — ANESTHESIA POSTPROCEDURE EVALUATION
* No procedures listed *.    total IV anesthesia    Anesthesia Post Evaluation      Multimodal analgesia: multimodal analgesia used between 6 hours prior to anesthesia start to PACU discharge  Patient location during evaluation: PACU  Patient participation: complete - patient participated  Level of consciousness: awake and alert  Pain management: adequate  Airway patency: patent  Anesthetic complications: no  Cardiovascular status: acceptable  Respiratory status: acceptable  Hydration status: acceptable  Post anesthesia nausea and vomiting:  none  Final Post Anesthesia Temperature Assessment:  Normothermia (36.0-37.5 degrees C)      INITIAL Post-op Vital signs: No vitals data found for the desired time range.

## 2022-05-16 ENCOUNTER — HOSPITAL ENCOUNTER (OUTPATIENT)
Dept: GENERAL RADIOLOGY | Age: 87
Discharge: HOME | End: 2022-05-16
Attending: OBSTETRICS & GYNECOLOGY
Payer: MEDICARE

## 2022-05-16 DIAGNOSIS — C54.9 MIXED MULLERIAN TUMOR (HCC): ICD-10-CM

## 2022-05-16 DIAGNOSIS — K61.2 ABSCESS OF ANAL OR RECTAL REGION: ICD-10-CM

## 2022-05-16 PROCEDURE — 74270 X-RAY XM COLON 1CNTRST STD: CPT

## 2022-05-16 PROCEDURE — 74011000636 HC RX REV CODE- 636: Performed by: OBSTETRICS & GYNECOLOGY

## 2022-05-16 PROCEDURE — 74011000636 HC RX REV CODE- 636

## 2022-05-16 RX ADMIN — DIATRIZOATE MEGLUMINE AND DIATRIZOATE SODIUM 1000 ML: 660; 100 LIQUID ORAL; RECTAL at 11:35

## 2022-05-18 ENCOUNTER — HOSPITAL ENCOUNTER (OUTPATIENT)
Dept: INFUSION THERAPY | Age: 87
Discharge: HOME | End: 2022-05-18
Payer: MEDICARE

## 2022-05-18 DIAGNOSIS — Z11.59 NEED FOR HEPATITIS B SCREENING TEST: ICD-10-CM

## 2022-05-18 DIAGNOSIS — R97.1 ELEVATED CA-125: ICD-10-CM

## 2022-05-18 DIAGNOSIS — C54.9 MIXED MULLERIAN TUMOR (HCC): Primary | ICD-10-CM

## 2022-05-18 DIAGNOSIS — Z11.59 NEED FOR HEPATITIS C SCREENING TEST: ICD-10-CM

## 2022-05-18 DIAGNOSIS — C54.9 MIXED MULLERIAN TUMOR (HCC): ICD-10-CM

## 2022-05-18 LAB
ALBUMIN SERPL-MCNC: 2.9 G/DL (ref 3.2–4.6)
ALBUMIN/GLOB SERPL: 0.5 {RATIO} (ref 1.2–3.5)
ALP SERPL-CCNC: 117 U/L (ref 50–136)
ALT SERPL-CCNC: 25 U/L (ref 12–65)
ANION GAP SERPL CALC-SCNC: 7 MMOL/L (ref 7–16)
AST SERPL-CCNC: 26 U/L (ref 15–37)
BASOPHILS # BLD: 0.1 K/UL (ref 0–0.2)
BASOPHILS NFR BLD: 1 % (ref 0–2)
BILIRUB SERPL-MCNC: 0.4 MG/DL (ref 0.2–1.1)
BUN SERPL-MCNC: 49 MG/DL (ref 8–23)
CALCIUM SERPL-MCNC: 9.1 MG/DL (ref 8.3–10.4)
CANCER AG125 SERPL-ACNC: 3987 U/ML (ref 1.5–35)
CHLORIDE SERPL-SCNC: 104 MMOL/L (ref 98–107)
CO2 SERPL-SCNC: 25 MMOL/L (ref 21–32)
CREAT SERPL-MCNC: 2 MG/DL (ref 0.6–1)
DIFFERENTIAL METHOD BLD: ABNORMAL
EOSINOPHIL # BLD: 0.3 K/UL (ref 0–0.8)
EOSINOPHIL NFR BLD: 3 % (ref 0.5–7.8)
ERYTHROCYTE [DISTWIDTH] IN BLOOD BY AUTOMATED COUNT: 17 % (ref 11.9–14.6)
GLOBULIN SER CALC-MCNC: 6.1 G/DL (ref 2.3–3.5)
GLUCOSE SERPL-MCNC: 75 MG/DL (ref 65–100)
HBV SURFACE AB SERPL IA-ACNC: 3.43 MIU/ML
HBV SURFACE AG SER QL: NONREACTIVE
HCT VFR BLD AUTO: 35 % (ref 35.8–46.3)
HCV AB SER QL: NONREACTIVE
HGB BLD-MCNC: 10.8 G/DL (ref 11.7–15.4)
IMM GRANULOCYTES # BLD AUTO: 0.1 K/UL (ref 0–0.5)
IMM GRANULOCYTES NFR BLD AUTO: 1 % (ref 0–5)
LYMPHOCYTES # BLD: 2.9 K/UL (ref 0.5–4.6)
LYMPHOCYTES NFR BLD: 30 % (ref 13–44)
MAGNESIUM SERPL-MCNC: 2.3 MG/DL (ref 1.8–2.4)
MCH RBC QN AUTO: 28.9 PG (ref 26.1–32.9)
MCHC RBC AUTO-ENTMCNC: 30.9 G/DL (ref 31.4–35)
MCV RBC AUTO: 93.6 FL (ref 79.6–97.8)
MONOCYTES # BLD: 1 K/UL (ref 0.1–1.3)
MONOCYTES NFR BLD: 10 % (ref 4–12)
NEUTS SEG # BLD: 5.4 K/UL (ref 1.7–8.2)
NEUTS SEG NFR BLD: 56 % (ref 43–78)
NRBC # BLD: 0 K/UL (ref 0–0.2)
PLATELET # BLD AUTO: 299 K/UL (ref 150–450)
PMV BLD AUTO: 8.9 FL (ref 9.4–12.3)
POTASSIUM SERPL-SCNC: 4.7 MMOL/L (ref 3.5–5.1)
PROT SERPL-MCNC: 9 G/DL (ref 6.3–8.2)
RBC # BLD AUTO: 3.74 M/UL (ref 4.05–5.2)
SODIUM SERPL-SCNC: 136 MMOL/L (ref 136–145)
WBC # BLD AUTO: 9.8 K/UL (ref 4.3–11.1)

## 2022-05-18 PROCEDURE — 74011000250 HC RX REV CODE- 250: Performed by: OBSTETRICS & GYNECOLOGY

## 2022-05-18 PROCEDURE — 86304 IMMUNOASSAY TUMOR CA 125: CPT

## 2022-05-18 PROCEDURE — 86803 HEPATITIS C AB TEST: CPT

## 2022-05-18 PROCEDURE — 83735 ASSAY OF MAGNESIUM: CPT

## 2022-05-18 PROCEDURE — 80053 COMPREHEN METABOLIC PANEL: CPT

## 2022-05-18 PROCEDURE — 36591 DRAW BLOOD OFF VENOUS DEVICE: CPT

## 2022-05-18 PROCEDURE — 86706 HEP B SURFACE ANTIBODY: CPT

## 2022-05-18 PROCEDURE — 96413 CHEMO IV INFUSION 1 HR: CPT

## 2022-05-18 PROCEDURE — 96367 TX/PROPH/DG ADDL SEQ IV INF: CPT

## 2022-05-18 PROCEDURE — 86704 HEP B CORE ANTIBODY TOTAL: CPT

## 2022-05-18 PROCEDURE — 74011250636 HC RX REV CODE- 250/636: Performed by: OBSTETRICS & GYNECOLOGY

## 2022-05-18 PROCEDURE — 74011000250 HC RX REV CODE- 250

## 2022-05-18 PROCEDURE — 96375 TX/PRO/DX INJ NEW DRUG ADDON: CPT

## 2022-05-18 PROCEDURE — 87340 HEPATITIS B SURFACE AG IA: CPT

## 2022-05-18 PROCEDURE — 74011000258 HC RX REV CODE- 258: Performed by: OBSTETRICS & GYNECOLOGY

## 2022-05-18 PROCEDURE — 85025 COMPLETE CBC W/AUTO DIFF WBC: CPT

## 2022-05-18 RX ORDER — DIPHENHYDRAMINE HYDROCHLORIDE 50 MG/ML
50 INJECTION, SOLUTION INTRAMUSCULAR; INTRAVENOUS ONCE
Status: COMPLETED | OUTPATIENT
Start: 2022-05-18 | End: 2022-05-18

## 2022-05-18 RX ORDER — SODIUM CHLORIDE 9 MG/ML
25 INJECTION, SOLUTION INTRAVENOUS CONTINUOUS
Status: DISCONTINUED | OUTPATIENT
Start: 2022-05-18 | End: 2022-05-19 | Stop reason: HOSPADM

## 2022-05-18 RX ORDER — SODIUM CHLORIDE 0.9 % (FLUSH) 0.9 %
10 SYRINGE (ML) INJECTION AS NEEDED
Status: DISCONTINUED | OUTPATIENT
Start: 2022-05-18 | End: 2022-05-19 | Stop reason: HOSPADM

## 2022-05-18 RX ORDER — DIPHENHYDRAMINE HYDROCHLORIDE 50 MG/ML
50 INJECTION, SOLUTION INTRAMUSCULAR; INTRAVENOUS AS NEEDED
Status: DISCONTINUED | OUTPATIENT
Start: 2022-05-18 | End: 2022-05-19 | Stop reason: HOSPADM

## 2022-05-18 RX ORDER — ONDANSETRON 2 MG/ML
8 INJECTION INTRAMUSCULAR; INTRAVENOUS ONCE
Status: COMPLETED | OUTPATIENT
Start: 2022-05-18 | End: 2022-05-18

## 2022-05-18 RX ORDER — SODIUM CHLORIDE 0.9 % (FLUSH) 0.9 %
10 SYRINGE (ML) INJECTION AS NEEDED
Status: DISCONTINUED | OUTPATIENT
Start: 2022-05-18 | End: 2022-05-20 | Stop reason: HOSPADM

## 2022-05-18 RX ORDER — HYDROCORTISONE SODIUM SUCCINATE 100 MG/2ML
100 INJECTION, POWDER, FOR SOLUTION INTRAMUSCULAR; INTRAVENOUS AS NEEDED
Status: DISCONTINUED | OUTPATIENT
Start: 2022-05-18 | End: 2022-05-19 | Stop reason: HOSPADM

## 2022-05-18 RX ADMIN — SODIUM CHLORIDE, PRESERVATIVE FREE 10 ML: 5 INJECTION INTRAVENOUS at 17:00

## 2022-05-18 RX ADMIN — DIPHENHYDRAMINE HYDROCHLORIDE 50 MG: 50 INJECTION, SOLUTION INTRAMUSCULAR; INTRAVENOUS at 15:22

## 2022-05-18 RX ADMIN — DEXAMETHASONE SODIUM PHOSPHATE 12 MG: 4 INJECTION, SOLUTION INTRAMUSCULAR; INTRAVENOUS at 15:26

## 2022-05-18 RX ADMIN — FAMOTIDINE 20 MG: 10 INJECTION INTRAVENOUS at 15:24

## 2022-05-18 RX ADMIN — ONDANSETRON 8 MG: 2 INJECTION INTRAMUSCULAR; INTRAVENOUS at 15:18

## 2022-05-18 RX ADMIN — SODIUM CHLORIDE 25 ML/HR: 9 INJECTION, SOLUTION INTRAVENOUS at 15:08

## 2022-05-18 RX ADMIN — SODIUM CHLORIDE, PRESERVATIVE FREE 10 ML: 5 INJECTION INTRAVENOUS at 13:35

## 2022-05-18 RX ADMIN — CARBOPLATIN 229 MG: 10 INJECTION, SOLUTION INTRAVENOUS at 16:08

## 2022-05-18 RX ADMIN — FOSAPREPITANT 150 MG: 150 INJECTION, POWDER, LYOPHILIZED, FOR SOLUTION INTRAVENOUS at 15:41

## 2022-05-18 NOTE — PROGRESS NOTES
Arrived to the [facility]. D1C1 Carbo completed. Patient tolerated well. Any issues or concerns during appointment: none. Patient instructed to call provider with temperature of 100.4 or greater or nausea/vomiting/ diarrhea or pain not controlled by medications. Discharged via wheelchair.

## 2022-05-18 NOTE — PROGRESS NOTES
Patient arrived to port lab for port access and lab draw   [address] accessed and labs drawn per protocol   *Port remains accessed   Patient discharged from port lab via wheel chair*

## 2022-05-19 LAB — HBV CORE AB SERPL QL IA: NEGATIVE

## 2022-05-23 DIAGNOSIS — C54.9 MIXED MULLERIAN TUMOR (HCC): Primary | ICD-10-CM

## 2022-05-23 DIAGNOSIS — R19.00 PELVIC MASS IN FEMALE: ICD-10-CM

## 2022-05-23 DIAGNOSIS — K62.5 BLOOD PER RECTUM: ICD-10-CM

## 2022-05-23 DIAGNOSIS — K61.2 ABSCESS OF ANAL OR RECTAL REGION: Primary | ICD-10-CM

## 2022-05-23 NOTE — PROGRESS NOTES
[facility] - Initial Evaluation   Patient: Zhao Lerner   YOB: 1935       Location: [facility] HEMATOLOGY AND ONCOLOGY - Provider participated in today's evaluation via telephone in [place], [place]. Patient completed today's evaluation from home. 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 Kendell Peñaloza has consented to a visit via telehealth in lieu of a face to face office visit during the coronavirus pandemic. Kendell Peñaloza was accompanied to today's visit by her niece Abraham Parmar. Personalized risk assessment was performed and genetic testing options were reviewed. For full details, please see Risk Assessment and Discussion in this document. Following genetic counseling, Patricia's action plan is:  · DEFERS TESTING: Following today's discussion, Kendell Peñaloza was not interested in pursuing genetic testing due to concerns about familial impact. 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 Treatment has started with chemo. Hormonal history:  · Age of Menarche: 8/9th grades  · Number of Pregnancies: 2  · Number of Live Births: 2  · Age of First Live Birth: Early 25s. 2nd right at 32. · Breast Feeding History: Denies  · Fertility Treatment: Denies  · Contraception Use: Denies  · Age of Menopause: Between 50 and 52  · Hormonal Replacement Therapy: Denies     Screening history:  · Last mammogram: At least 30 years ago (only one)  · Latest colonoscopy: Denies history. Attempted one but doctor refused 6 weeks ago due to current health condition. · History of polyps: N/A  · Last dermatological exam: Denies. Will plan to go soon.    · History of additional abnormal cancer screening: Denies     Social history:  · Tobacco use: Formerly   · Alcohol use: Denies     General medical history:  Past Medical History:   Diagnosis Date    [facility]     Depression     Diabetes ([facility]     Hypertension  ·        Surgical history:  · Hysterectomy: Denies  · Bilateral salpingo oophorectomy: Denies  Past Surgical History:   Procedure Laterality Date    ANKLE FRACTURE SURGERY      ankle broken    IR PORT PLACEMENT EQUAL OR GREATER THAN 5 YEARS  2022    IR PORT PLACEMENT EQUAL OR GREATER THAN 5 YEARS  2022    IR PORT PLACEMENT EQUAL OR GREATER THAN 5 YEARS 2022 [facility]    US ABDOMINAL MASS BIOPSY PERCUTANEOUS  2022    US ABDOMINAL MASS BIOPSY PERCUTANEOUS 2022 [facility] ·         Family History   A detailed three-generation family history was taken today. Fawad Ham reported the following family history of cancer:  1. Colon cancer  · Sister, diagnosed 72years of age  · Maternal grandfather, who  in his 62s  2. Bladder cancer   · Brother, diagnosed at 62years of age   1. Leukemia cancer   · Paternal uncle     There is no known Ashkenazi Rastafarian ancestry. There is no report of consanguinity. The history is by [facility] report solely, and our counseling and risk assessment is based on the accuracy of this provided history. Should the information collected be found to be changed or different, our original assessment and recommendations may change. A copy of the pedigree can be found in . Risk Assessment and Discussion   GENETICS OF HEREDITARY CANCER SYNDROMES: We first explained that our genetic material, which we inherit from both our parents, is like a set of directions that tells our body how to grow and function. Our genetic material can be further broken down into sections called genes, and these are specific sets of directions that are important in specific body functions. Harmful differences in the amount, spelling or order of our genetic material that cause genes not to function correctly are called pathogenic variants, and can cause symptoms or disease. During the appointment we reviewed that around 5-10% of individuals diagnosed with cancer will have a hereditary cancer susceptibility syndrome. Hereditary cancer susceptibility syndromes are caused most often by pathogenic (harmful) variants (genetic differences) in a group of genes called tumor suppressor genes. Tumor suppressor genes typically function to protect an individual from developing cancer. If an individual has a pathogenic variant in one of their two tumor suppressor genes, that copy does not function as it usually would to protect the body from cancer development and the individual is at increased risk to develop cancer. When an individual is born with one nonfunctioning tumor suppressor gene, the one working copy that is protecting the individual from cancer development can acquire a change through various environmental factors (example: UV radiation, diet, smoking, etc.) and can become nonfunctional as well. With two nonfunctional tumor suppressor genes, the individual does not have any of the protection from that gene and is at risk to develop cancer. The specific cancer type(s) an individual is at increased risk for depend on the specific tumor suppressor gene identified to have a pathogenic variant. Changes to medical management may be recommended for this patient if they are shown to have a pathogenic variant in a tumor suppressor gene, and these are dependent on the gene. A majority of cases of hereditary ovarian cancer are due to pathogenic variants in the BRCA1 and BRCA2 genes, which are associated with Hereditary Breast and Ovarian Cancer Syndrome. There are additional genes that can also cause increased risk for ovarian cancer, but these make up a smaller proportion of overall cases.      We determined that Patricia's family history is suspicious for a hereditary cancer syndrome because of her rare cancer type and the multiple family members with a related cancer type. INHERITANCE OF HEREDITARY CANCER SYNDROMES: We discussed that hereditary cancer syndromes usually run through families (or are inherited) in an autosomal dominant pattern. In autosomal dominant conditions, the term \"autosomal\" means that both females and males have an increased chance of having signs or symptoms of a condition. The term \"dominant\" means that an individual needs to have a harmful change in one copy of a gene (either the one inherited from a person's mother or the one inherited from a person's father) to be at risk for the signs or symptoms of a condition. Individuals who have a dominant genetic condition have a 50% (1 in 2) chance of passing on the harmful gene change for each pregnancy. POSSIBLE RESULTS OF GENETIC TESTING: We reviewed the possible results of genetic testing. We reviewed that testing could return a positive result, meaning we found a pathogenic variant that increases cancer risk. In the context of a positive result, a finding could be unexpected, such as finding a pathogenic variant in a gene that increases the lifetime risk for a cancer the patient and their family have no history of. It is very possible that a positive result would impact current medical management of the patient and could result in increased or additional screenings, or even consideration of prophylactic surgery to decrease cancer risk. We could receive a negative result, meaning we did not find a pathogenic variant. We discussed that a negative result does not mean a patient will never get cancer, just that testing did not find a pathogenic variant that is responsible for increased cancer risk over the general population.  We encouraged the patient that in the event of a negative result, it is still necessary to continue recommended cancer screening. We also discussed that the result could be negative for various reasons, and in some cases may not mean an individual's estimated risk should be lowered to population risk for cancer. Lastly, we could receive a result known as a variant (genetic difference) of uncertain significance. A variant of uncertain significance is a when we identify a genetic difference in a gene, but based on the current data and information available the testing laboratory cannot say whether or not that difference increases lifetime risk for cancer. We discussed that if we were to get this result, we would treat it as a negative, and not recommend changes to medical management or testing of additional family members for this variant unless requested by the laboratory to provide clarification. We discussed that changes in recommendations for medical management and family members eligible for testing would likely not take place unless the variant is reclassified. LIMITATIONS TO GENETIC TESTING: We also discussed several limitations regarding genetic testing in the context of hereditary cancer syndrome. We first discussed the concept of reduced penetrance. This refers to the idea that a person may carry a harmful, disease-causing genetic change but never develop signs of symptoms of the disease. This is the case with hereditary cancer syndromes. A person may carry a change that puts him/her at an increased risk of developing cancer but may never actually develop cancer. Even if we were to identify a disease-causing genetic change in [place], for example, we would not be able to predict whether or not she would develop the related complications and concerns. In addition, we discussed the idea of variable expressivity. This refers to the idea that two people may carry the exact same genetic change but have different signs, symptoms, and progression of a disease.  So overall, we discussed that even if a disease-causing change is identified in Sharon Crenshaw, we would not be able to fully predict if/when she would develop symptoms and the progression of her disease. Finally, we discussed the implications of the Genetic Information Nondiscrimination Act (EDMUNDO) of 2008. This law provides federal protection from genetic discrimination in regards to health insurance and employment. EDMUNDO prevents health insurance companies from using genetic information when making decisions regarding eligibility or premiums. In addition, this law also protects against genetic discrimination by most employers. EDMUNDO does not apply to government employees, members of the [employer], or to employers with fewer than 15 employees. The other main limitation of EDMUNDO is that the law does not cover life insurance, long-term care insurance or disability insurance. Additionally, EDMUNDO does not protect an individual if they already have the disease; it only protects an individual that has a genetic predisposition to a disease. GENETIC TESTING FOR HEREDITARY CANCER SYNDROMES: We discussed that genetic testing for hereditary cancer syndromes can be done by looking at one specific gene, a few genes related to a specific type of cancer, or many genes related to common hereditary cancers. All of these testing options look for misspellings within the genes (called sequence variants) and any missing or extra pieces of genetic material in these genes (called deletions and duplications). After reviewing the benefits, risks, and limitations of testing, Sharon Crenshaw declined genetic testing due to concern about possible impacts on family. Summary   Based on her personal history of ovarian cancer, Sharon Crenshaw meets NCCN and Medicare criteria for testing. DEFERS TESTING  Based on todays discussion, Sharon Crenshaw indicated that she does not wish to pursue genetic testing. she  is aware that if she wishes to pursue this testing at any point in the future, she is welcome to contact us.  She also gave verbal permission for me to discuss testing/her history with her niece Katarina Singh. We encouraged Gerardo Rosales to stay in contact with us regarding changes to the family history, as new information may impact our risk assessment. We enjoyed meeting with Gerardo Rosales and hope that we were able to answer her questions. Luba Yap MS, [facility]  Genetic Counselor  [phone]     Time: 28 minutes with greater than 50% spent on counseling and coordination of care.      CC: Erum Rich MD

## 2022-05-24 ENCOUNTER — TELEMEDICINE (OUTPATIENT)
Dept: ONCOLOGY | Age: 87
End: 2022-05-24

## 2022-05-24 DIAGNOSIS — Z80.52 FAMILY HISTORY OF BLADDER CANCER: ICD-10-CM

## 2022-05-24 DIAGNOSIS — Z80.0 FAMILY HISTORY OF COLON CANCER: ICD-10-CM

## 2022-05-24 DIAGNOSIS — C54.9 MIXED MULLERIAN TUMOR (HCC): Primary | ICD-10-CM

## 2022-06-06 DIAGNOSIS — C54.9 MIXED MULLERIAN TUMOR (HCC): Primary | ICD-10-CM

## 2022-06-07 NOTE — ADDENDUM NOTE
Encounter addended by: Anny Arias RN on: 6/7/2022 3:59 PM   Actions taken: MAR administration accepted

## 2022-06-07 NOTE — PROGRESS NOTES
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 ?, imgiang march 2022    Only symptom at time rectal bleeding, resolved. History of Present Illness   Blanquita Moore is a 82 y. o. 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 Lisa Simpson, on nocturnal home  O2), NIDDM, htn, OA, right ankle surgery after fracture, hospitalization 2/8/22 for Covid-19 infection, UTI, dehydration. More recently seen by them and underwent PET scan after a more recent CT abd (reportedly done for abdominal discomfort and blood per rectum) reportedly had shown abdominal mass. PET scan revealed hypermetabolic multiple abdominal masses as well as right  cardiophrenic mass concerning for malignancy including lymphoma. A hypermetabolic 4.1 cm fluid and gas containing collection located at rectosigmoid junction and uterine fundus concerning for possible abscess also noted. A mildly hypermetabolic consolidative  appearing opacities in the right greater than left lung most consistent with subacute infection/inflammatory process also noted. Denies any recent fevers or drenching night sweats, appears non-toxic.        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      Ca 125 highly elevated    Past Medical History     Past Medical History:   Diagnosis Date    [facility]     Depression     Diabetes ([facility] [facility]     Hypertension         Past Surgical History Past Surgical History:   Procedure Laterality Date    ANKLE FRACTURE SURGERY      ankle broken    IR PORT PLACEMENT EQUAL OR GREATER THAN 5 YEARS  5/11/2022    IR PORT PLACEMENT EQUAL OR GREATER THAN 5 YEARS  5/11/2022    IR PORT PLACEMENT EQUAL OR GREATER THAN 5 YEARS 5/11/2022 [facility]    US ABDOMINAL MASS BIOPSY PERCUTANEOUS  4/28/2022    US ABDOMINAL MASS BIOPSY PERCUTANEOUS 4/28/2022 [facility] US        Medications      Current Outpatient Medications:     prochlorperazine (COMPAZINE) 5 MG tablet, TAKE 1 TABLET BY MOUTH EVERY 6 HOURS AS NEEDED FOR NAUSEA OR VOMITING, Disp: , Rfl:     ondansetron (ZOFRAN-ODT) 4 MG disintegrating tablet, , Disp: , Rfl:     traMADol (ULTRAM) 50 MG tablet, , Disp: , Rfl:     albuterol sulfate HFA (VENTOLIN HFA) 108 (90 Base) MCG/ACT inhaler, INHALE 2 PUFFS BY MOUTH EVERY 6 HOURS AS NEEDED, Disp: , Rfl:     aspirin 81 MG chewable tablet, Take 81 mg by mouth daily, Disp: , Rfl:     calcium carbonate-vitamin D 600-200 MG-UNIT TABS, Take 2 tablets by mouth daily, Disp: , Rfl:     cyanocobalamin 500 MCG tablet, Take 500 mcg by mouth daily, Disp: , Rfl:     glimepiride (AMARYL) 1 MG tablet, Take 1 mg by mouth daily, Disp: , Rfl:     ibuprofen (ADVIL;MOTRIN) 200 MG tablet, Take 3 tablets by mouth as needed, Disp: , Rfl:     lisinopril-hydroCHLOROthiazide (PRINZIDE;ZESTORETIC) 10-12.5 MG per tablet, Take 1 tablet by mouth daily, Disp: , Rfl:     Omega-3 Fatty Acids (FISH OIL) 1000 MG CAPS, Take 1 capsule by mouth daily, Disp: , Rfl:     umeclidinium-vilanterol (ANORO ELLIPTA) 62.5-25 MCG/INH AEPB inhaler, INHALE 1 PUFF BY MOUTH EVERY DAY, Disp: , Rfl:     LORazepam (ATIVAN) 0.5 MG tablet, Take 0.5 mg by mouth every 8 hours as needed.  (Patient not taking: Reported on 6/8/2022), Disp: , Rfl:      Allergies   Metformin and Penicillins    Social History     Social History     Tobacco History     Smoking Status  Former Smoker    Smokeless Tobacco Use  Never Used          Alcohol History     Alcohol Use Status  Never          Drug Use     Drug Use Status  Never          Sexual Activity     Sexually Active  Not Asked                Family History     Family History   Problem Relation Age of Onset    Colon Cancer Sister     Cancer Sister         Bladder    Cancer Brother         bladder       Review of Systems   Review of Systems   Constitutional: Positive for fatigue. HENT: Negative. Eyes: Negative. Respiratory: Negative. Cardiovascular: Negative. Gastrointestinal: Negative. Endocrine: Negative. Genitourinary: Negative. Musculoskeletal: Negative. Skin: Negative. Allergic/Immunologic: Negative. Neurological: Negative. Hematological: Negative. Psychiatric/Behavioral: Negative. All other systems reviewed and are negative. Physical Exam     ECOG PERFORMANCE STATUS - 2 - Ambulatory and capable of all selfcare but unable to carry out any work activities. Up and about more than 50% of waking hours. Blood pressure (!) 131/58, pulse 94, temperature 98.5 °F (36.9 °C), temperature source Oral, resp. rate 16, height 5' 2.5\" (1.588 m), weight 175 lb 9.6 oz (79.7 kg), SpO2 97 %. Physical Exam  Vitals and nursing note reviewed. Exam conducted with a chaperone present. Constitutional:       Appearance: Normal appearance. HENT:      Head: Normocephalic and atraumatic. Nose: No congestion. Cardiovascular:      Rate and Rhythm: Normal rate and regular rhythm. Pulses: Normal pulses. Heart sounds: Normal heart sounds. Pulmonary:      Effort: Pulmonary effort is normal.      Breath sounds: Normal breath sounds. Abdominal:      General: Abdomen is flat. Palpations: There is no mass. Musculoskeletal:         General: No swelling. Normal range of motion. Cervical back: Normal range of motion. Skin:     General: Skin is warm and dry. Neurological:      Mental Status: She is alert.    Psychiatric: Mood and Affect: Mood normal.         Behavior: Behavior normal.         Thought Content:  Thought content normal.         Judgment: Judgment normal.         Labs        Recent Results (from the past 48 hour(s))   CBC with Auto Differential    Collection Time: 06/08/22 12:34 PM   Result Value Ref Range    WBC 8.3 4.3 - 11.1 K/uL    RBC 3.73 (L) 4.05 - 5.2 M/uL    Hemoglobin 11.1 (L) 11.7 - 15.4 g/dL    Hematocrit 35.2 (L) 35.8 - 46.3 %    MCV 94.4 79.6 - 97.8 FL    MCH 29.8 26.1 - 32.9 PG    MCHC 31.5 31.4 - 35.0 g/dL    RDW 15.9 (H) 11.9 - 14.6 %    Platelets 567 787 - 830 K/uL    MPV 8.5 (L) 9.4 - 12.3 FL    nRBC 0.00 0.0 - 0.2 K/uL    Seg Neutrophils 55 43 - 78 %    Lymphocytes 32 13 - 44 %    Monocytes 9 4.0 - 12.0 %    Eosinophils % 2 0.5 - 7.8 %    Basophils 1 0.0 - 2.0 %    Immature Granulocytes 1 0.0 - 5.0 %    Segs Absolute 4.6 1.7 - 8.2 K/UL    Absolute Lymph # 2.7 0.5 - 4.6 K/UL    Absolute Mono # 0.8 0.1 - 1.3 K/UL    Absolute Eos # 0.2 0.0 - 0.8 K/UL    Basophils Absolute 0.0 0.0 - 0.2 K/UL    Absolute Immature Granulocyte 0.1 0.0 - 0.5 K/UL    Differential Type AUTOMATED     Comprehensive Metabolic Panel    Collection Time: 06/08/22 12:34 PM   Result Value Ref Range    Sodium 133 (L) 136 - 145 mmol/L    Potassium 4.4 3.5 - 5.1 mmol/L    Chloride 101 98 - 107 mmol/L    CO2 26 21 - 32 mmol/L    Anion Gap 6 (L) 7 - 16 mmol/L    Glucose 124 (H) 65 - 100 mg/dL    BUN 36 (H) 8 - 23 MG/DL    CREATININE 1.50 (H) 0.6 - 1.0 MG/DL    GFR  43 (L) >60 ml/min/1.73m2    GFR Non- 35 (L) >60 ml/min/1.73m2    Calcium 9.3 8.3 - 10.4 MG/DL    Total Bilirubin 0.4 0.2 - 1.1 MG/DL    ALT 25 12 - 65 U/L    AST 24 15 - 37 U/L    Alk Phosphatase 103 50 - 136 U/L    Total Protein 9.0 (H) 6.3 - 8.2 g/dL    Albumin 3.0 (L) 3.2 - 4.6 g/dL    Globulin 6.0 (H) 2.3 - 3.5 g/dL    Albumin/Globulin Ratio 0.5 (L) 1.2 - 3.5     Magnesium    Collection Time: 06/08/22 12:34 PM   Result Value Ref Range Magnesium 2.1 1.8 - 2.4 mg/dL        No results found for:       Ref Range & Units 5/18/22 1325   ,C125 1.5 - 35.0 U/mL 3,987 High                Pathology      Name: Grisel Robert   Accession Number:   [id number]   MR #   [id number]   Date Obtained:   4/28/2022              \"ABDOMINAL MASS\":  ADENOCARCINOMA, CONSISTENT WITH MULLERIAN ORIGIN. Sign Out Date: 5/3/2022  Richard Ghotra MD                           STF- ER/VA/[id number] BY IHC                                                                                   Status:  Reviewed BySerge Hagen. Tino Ghotra MD on 5/2/2022                                 Interpretation                       "[employer] Flow Cytometric Analysis     Diagnosis:     No flow immunophenotypic evidence of a lymphoproliferative disorder.           See full report in [facility] as errors can occur when results are   imbedded into this report.                                 STF-IMMUNOHISTOCHEMISTRY                                                                                   Status:  Reviewed BySerge Hagen.  Tino Ghotra MD on 5/3/2022                                 Interpretation                       Immunohistochemical Stain Panel:          Interpretation:  Immunohistochemical findings most consistent with   adenocarcinoma of Müllerian origin.  Dr. Yuridia Rucker concurs.       Antibody/Test               Marker For                              Result   Keratin AE1/AE3             Broad spectrum epithelial marker                  Positive   Cytokeratin 7               Lung, breast, upper GE, serous ovary                  Positive   Cytokeratin 20               Colon, mucinous ovary, urothelium                  Negative   CDX2                    Gastrointestinal carcinomas                       Negative   GATA3               Urothelial, breast carcinoma                       Negative   Calretinin                Mesothelioma                                 Negative   ER                    Estrogen receptor                          Positive   Napsin                     Lung adenocarcinoma                           Negative   Lyman-8                    Renal cell carcinoma                           Positive   TTF-1                     Lung and thyroid adenocarcinoma                 Negative   WT-1                    Serous carcinoma, mesothelioma                 Positive   Imaging/Diagnostics Last 24 Hours   No results found. Radiology:    CT Result (most recent):  No results found for this or any previous visit from the past 3650 days. PET Results (most recent):  PET CT SKULL BASE TO MID THIGH 03/29/2022    Narrative  PET/CT    Indication: Abdominal mass on outside CT    Radiopharmaceutical: 14.4 mCi F18-FDG, intravenously. Technique: Imaging was performed from the skull through the proximal thighs  using routine PET/CT acquisition protocol. Imaging was performed approximately  60 minutes post injection. Oral contrast was administered. Radiation dose  reduction techniques were used for this study:  Our CT scanners use one or all  of the following: Automated exposure control, adjustment of the mA and/or kVp  according to patient's size, iterative reconstruction. Serum glucose: 93 mg/dL prior to injection. Comparison studies: None available. Findings:    Head and Neck: No enlarged or hypermetabolic lymph nodes within the neck. Chest: A lymph node in the right cardiophrenic fat is hypermetabolic, measuring  1.6 cm in maximum SUV of 10.9. Hypermetabolic nodular and slightly consolidative appearing opacities in the  posterior right upper and lower lobes and to a lesser extent the left lower  lobe.     Abdomen/Pelvis: Hypermetabolic solid masses throughout the abdomen index as  follows:  Peripancreatic/periportal mass measuring 3.7 cm, maximum SUV 20.3.  3.2 cm mass medially to the gastric fundus, maximum SUV 13.2  Left para-aortic mass measuring 2.6 cm, maximum SUV 15.6. Omental mass measuring 3.5 cm, maximum SUV 13.3. Left mesenteric mass measuring 2.8 cm, maximum SUV 16.7. Right lower quadrant mass measuring 8.9 x 5.1 cm, maximum SUV 19.5. Right pelvic sidewall mass measuring 6.4 x 5.0 cm, axillary SUV 26. Hypermetabolic gas and fluid containing collection measuring 4.1 cm adjacent to  the sigmoid colon contacting the uterine fundus. Impression  1. Hypermetabolic right cardiophrenic and abdominal masses as above concerning  for malignancy such as lymphoma. 2.  Hypermetabolic 4.1 cm fluid and gas-containing collection located between  the rectosigmoid junction and uterine fundus concerning for a possible abscess  or pyomyoma. Recommend correlation for any evidence of infection. 3.  Mildly hypermetabolic nodular and slightly consolidative appearing opacities  in the right greater than left lungs appearance most consistent with a subacute  infectious/inflammatory process. The study was referred to the imaging navigator to expedite delivery of results. 12    Mammo Results (most recent):  No results found for this or any previous visit from the past 365 days. US Result (most recent):  US ABDOMINAL MASS BIOPSY PERCUTANEOUS 04/28/2022    Narrative  History: Abdominal masses    Consent: Informed written and oral consent was obtained from the patient after  explanation of benefits and risks (including, but not limited to: Infection,  Hemorrhage, and Visceral injury) of procedure. The patient's questions were  answered and requested that we proceed. Procedure:  Sterile barrier technique (including:  cap, mask, sterile gloves,  sterile sheet, hand hygiene, and chlorhexidene for cutaneous antisepsis) were  used. Following routine prep and drape of the upper abdomen, a local field  block with 1% lidocaine was achieved.   Using real-time ultrasound guidance, a 17  gauge needle was advanced into the superficial aspect of the left upper quadrant  abdominal mass. Through this base needle, 4 18 gauge core biopsies were  performed. The postprocedure ultrasound demonstrates no evidence of hematoma. Complications: None. Medications:  Under physician supervision, intraservice time of 17 minutes of  moderate intravenous conscious sedation was performed by administering Versed,  Fentanyl, intravenously. Continuous pulse oximetry, heart rate, and blood  pressure monitoring was performed with an independent, trained observer present. Impression  Technically successful ultrasound guided left upper quadrant  abdominal mass biopsy. The gas and fluid filled focus in the pelvis is not  amenable to percutaneous drainage or biopsy. Specimen: Sent to cytopathology. Plan: The patient will recover for approximately 1 hour         Assessment       Diagnosis Orders   1.  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

## 2022-06-08 ENCOUNTER — HOSPITAL ENCOUNTER (OUTPATIENT)
Dept: INFUSION THERAPY | Age: 87
Discharge: HOME | End: 2022-06-08
Payer: MEDICARE

## 2022-06-08 ENCOUNTER — OFFICE VISIT (OUTPATIENT)
Dept: ONCOLOGY | Age: 87
End: 2022-06-08
Payer: MEDICARE

## 2022-06-08 VITALS
WEIGHT: 175.6 LBS | DIASTOLIC BLOOD PRESSURE: 58 MMHG | TEMPERATURE: 98.5 F | HEART RATE: 94 BPM | RESPIRATION RATE: 16 BRPM | SYSTOLIC BLOOD PRESSURE: 131 MMHG | OXYGEN SATURATION: 97 % | HEIGHT: 63 IN | BODY MASS INDEX: 31.11 KG/M2

## 2022-06-08 DIAGNOSIS — C54.9 MIXED MULLERIAN TUMOR (HCC): Primary | ICD-10-CM

## 2022-06-08 DIAGNOSIS — C54.9 MIXED MULLERIAN TUMOR (HCC): ICD-10-CM

## 2022-06-08 LAB
ALBUMIN SERPL-MCNC: 3 G/DL (ref 3.2–4.6)
ALBUMIN/GLOB SERPL: 0.5 {RATIO} (ref 1.2–3.5)
ALP SERPL-CCNC: 103 U/L (ref 50–136)
ALT SERPL-CCNC: 25 U/L (ref 12–65)
ANION GAP SERPL CALC-SCNC: 6 MMOL/L (ref 7–16)
AST SERPL-CCNC: 24 U/L (ref 15–37)
BASOPHILS # BLD: 0 K/UL (ref 0–0.2)
BASOPHILS NFR BLD: 1 % (ref 0–2)
BILIRUB SERPL-MCNC: 0.4 MG/DL (ref 0.2–1.1)
BUN SERPL-MCNC: 36 MG/DL (ref 8–23)
CALCIUM SERPL-MCNC: 9.3 MG/DL (ref 8.3–10.4)
CANCER AG125 SERPL-ACNC: 2479 U/ML (ref 1.5–35)
CHLORIDE SERPL-SCNC: 101 MMOL/L (ref 98–107)
CO2 SERPL-SCNC: 26 MMOL/L (ref 21–32)
CREAT SERPL-MCNC: 1.5 MG/DL (ref 0.6–1)
DIFFERENTIAL METHOD BLD: ABNORMAL
EOSINOPHIL # BLD: 0.2 K/UL (ref 0–0.8)
EOSINOPHIL NFR BLD: 2 % (ref 0.5–7.8)
ERYTHROCYTE [DISTWIDTH] IN BLOOD BY AUTOMATED COUNT: 15.9 % (ref 11.9–14.6)
GLOBULIN SER CALC-MCNC: 6 G/DL (ref 2.3–3.5)
GLUCOSE SERPL-MCNC: 124 MG/DL (ref 65–100)
HCT VFR BLD AUTO: 35.2 % (ref 35.8–46.3)
HGB BLD-MCNC: 11.1 G/DL (ref 11.7–15.4)
IMM GRANULOCYTES # BLD AUTO: 0.1 K/UL (ref 0–0.5)
IMM GRANULOCYTES NFR BLD AUTO: 1 % (ref 0–5)
LYMPHOCYTES # BLD: 2.7 K/UL (ref 0.5–4.6)
LYMPHOCYTES NFR BLD: 32 % (ref 13–44)
MAGNESIUM SERPL-MCNC: 2.1 MG/DL (ref 1.8–2.4)
MCH RBC QN AUTO: 29.8 PG (ref 26.1–32.9)
MCHC RBC AUTO-ENTMCNC: 31.5 G/DL (ref 31.4–35)
MCV RBC AUTO: 94.4 FL (ref 79.6–97.8)
MONOCYTES # BLD: 0.8 K/UL (ref 0.1–1.3)
MONOCYTES NFR BLD: 9 % (ref 4–12)
NEUTS SEG # BLD: 4.6 K/UL (ref 1.7–8.2)
NEUTS SEG NFR BLD: 55 % (ref 43–78)
NRBC # BLD: 0 K/UL (ref 0–0.2)
PLATELET # BLD AUTO: 243 K/UL (ref 150–450)
PMV BLD AUTO: 8.5 FL (ref 9.4–12.3)
POTASSIUM SERPL-SCNC: 4.4 MMOL/L (ref 3.5–5.1)
PROT SERPL-MCNC: 9 G/DL (ref 6.3–8.2)
RBC # BLD AUTO: 3.73 M/UL (ref 4.05–5.2)
SODIUM SERPL-SCNC: 133 MMOL/L (ref 136–145)
WBC # BLD AUTO: 8.3 K/UL (ref 4.3–11.1)

## 2022-06-08 PROCEDURE — 85025 COMPLETE CBC W/AUTO DIFF WBC: CPT

## 2022-06-08 PROCEDURE — 96413 CHEMO IV INFUSION 1 HR: CPT

## 2022-06-08 PROCEDURE — 1123F ACP DISCUSS/DSCN MKR DOCD: CPT | Performed by: OBSTETRICS & GYNECOLOGY

## 2022-06-08 PROCEDURE — 1036F TOBACCO NON-USER: CPT | Performed by: OBSTETRICS & GYNECOLOGY

## 2022-06-08 PROCEDURE — G8428 CUR MEDS NOT DOCUMENT: HCPCS | Performed by: OBSTETRICS & GYNECOLOGY

## 2022-06-08 PROCEDURE — 99214 OFFICE O/P EST MOD 30 MIN: CPT | Performed by: OBSTETRICS & GYNECOLOGY

## 2022-06-08 PROCEDURE — 36591 DRAW BLOOD OFF VENOUS DEVICE: CPT

## 2022-06-08 PROCEDURE — G8417 CALC BMI ABV UP PARAM F/U: HCPCS | Performed by: OBSTETRICS & GYNECOLOGY

## 2022-06-08 PROCEDURE — 96367 TX/PROPH/DG ADDL SEQ IV INF: CPT

## 2022-06-08 PROCEDURE — 86304 IMMUNOASSAY TUMOR CA 125: CPT

## 2022-06-08 PROCEDURE — 2580000003 HC RX 258: Performed by: OBSTETRICS & GYNECOLOGY

## 2022-06-08 PROCEDURE — 80053 COMPREHEN METABOLIC PANEL: CPT

## 2022-06-08 PROCEDURE — 6360000002 HC RX W HCPCS: Performed by: OBSTETRICS & GYNECOLOGY

## 2022-06-08 PROCEDURE — 83735 ASSAY OF MAGNESIUM: CPT

## 2022-06-08 PROCEDURE — 1090F PRES/ABSN URINE INCON ASSESS: CPT | Performed by: OBSTETRICS & GYNECOLOGY

## 2022-06-08 PROCEDURE — 96375 TX/PRO/DX INJ NEW DRUG ADDON: CPT

## 2022-06-08 PROCEDURE — G8400 PT W/DXA NO RESULTS DOC: HCPCS | Performed by: OBSTETRICS & GYNECOLOGY

## 2022-06-08 RX ORDER — SODIUM CHLORIDE 0.9 % (FLUSH) 0.9 %
5-40 SYRINGE (ML) INJECTION PRN
Status: DISCONTINUED | OUTPATIENT
Start: 2022-06-08 | End: 2022-06-09 | Stop reason: HOSPADM

## 2022-06-08 RX ORDER — EPINEPHRINE 1 MG/ML
0.3 INJECTION, SOLUTION, CONCENTRATE INTRAVENOUS PRN
Status: CANCELLED | OUTPATIENT
Start: 2022-06-08

## 2022-06-08 RX ORDER — HEPARIN SODIUM (PORCINE) LOCK FLUSH IV SOLN 100 UNIT/ML 100 UNIT/ML
500 SOLUTION INTRAVENOUS PRN
Status: CANCELLED | OUTPATIENT
Start: 2022-06-08

## 2022-06-08 RX ORDER — ONDANSETRON 2 MG/ML
8 INJECTION INTRAMUSCULAR; INTRAVENOUS ONCE
Status: CANCELLED | OUTPATIENT
Start: 2022-06-08 | End: 2022-06-08

## 2022-06-08 RX ORDER — ONDANSETRON 4 MG/1
TABLET, ORALLY DISINTEGRATING ORAL
COMMUNITY
Start: 2022-05-18

## 2022-06-08 RX ORDER — PROCHLORPERAZINE MALEATE 5 MG/1
TABLET ORAL
COMMUNITY
Start: 2022-05-19

## 2022-06-08 RX ORDER — MEPERIDINE HYDROCHLORIDE 50 MG/ML
12.5 INJECTION INTRAMUSCULAR; INTRAVENOUS; SUBCUTANEOUS PRN
Status: CANCELLED | OUTPATIENT
Start: 2022-06-08

## 2022-06-08 RX ORDER — DIPHENHYDRAMINE HYDROCHLORIDE 50 MG/ML
50 INJECTION INTRAMUSCULAR; INTRAVENOUS
Status: CANCELLED | OUTPATIENT
Start: 2022-06-08

## 2022-06-08 RX ORDER — FAMOTIDINE 10 MG/ML
20 INJECTION, SOLUTION INTRAVENOUS
Status: CANCELLED | OUTPATIENT
Start: 2022-06-08

## 2022-06-08 RX ORDER — ACETAMINOPHEN 325 MG/1
650 TABLET ORAL
Status: CANCELLED | OUTPATIENT
Start: 2022-06-08

## 2022-06-08 RX ORDER — SODIUM CHLORIDE 0.9 % (FLUSH) 0.9 %
10 SYRINGE (ML) INJECTION PRN
Status: DISCONTINUED | OUTPATIENT
Start: 2022-06-08 | End: 2022-06-09 | Stop reason: HOSPADM

## 2022-06-08 RX ORDER — SODIUM CHLORIDE 0.9 % (FLUSH) 0.9 %
5-40 SYRINGE (ML) INJECTION PRN
Status: CANCELLED | OUTPATIENT
Start: 2022-06-08

## 2022-06-08 RX ORDER — ONDANSETRON 2 MG/ML
8 INJECTION INTRAMUSCULAR; INTRAVENOUS
Status: CANCELLED | OUTPATIENT
Start: 2022-06-08

## 2022-06-08 RX ORDER — SODIUM CHLORIDE 9 MG/ML
5-250 INJECTION, SOLUTION INTRAVENOUS PRN
Status: DISCONTINUED | OUTPATIENT
Start: 2022-06-08 | End: 2022-06-09 | Stop reason: HOSPADM

## 2022-06-08 RX ORDER — ONDANSETRON 2 MG/ML
8 INJECTION INTRAMUSCULAR; INTRAVENOUS ONCE
Status: COMPLETED | OUTPATIENT
Start: 2022-06-08 | End: 2022-06-08

## 2022-06-08 RX ORDER — SODIUM CHLORIDE 9 MG/ML
INJECTION, SOLUTION INTRAVENOUS CONTINUOUS
Status: CANCELLED | OUTPATIENT
Start: 2022-06-08

## 2022-06-08 RX ORDER — DIPHENHYDRAMINE HYDROCHLORIDE 50 MG/ML
50 INJECTION INTRAMUSCULAR; INTRAVENOUS ONCE
Status: CANCELLED | OUTPATIENT
Start: 2022-06-08 | End: 2022-06-08

## 2022-06-08 RX ORDER — SODIUM CHLORIDE 9 MG/ML
5-250 INJECTION, SOLUTION INTRAVENOUS PRN
Status: CANCELLED | OUTPATIENT
Start: 2022-06-08

## 2022-06-08 RX ORDER — SODIUM CHLORIDE 9 MG/ML
5-40 INJECTION INTRAVENOUS PRN
Status: CANCELLED | OUTPATIENT
Start: 2022-06-08

## 2022-06-08 RX ORDER — ALBUTEROL SULFATE 90 UG/1
4 AEROSOL, METERED RESPIRATORY (INHALATION) PRN
Status: CANCELLED | OUTPATIENT
Start: 2022-06-08

## 2022-06-08 RX ORDER — TRAMADOL HYDROCHLORIDE 50 MG/1
TABLET ORAL
COMMUNITY
Start: 2022-05-03 | End: 2022-07-20 | Stop reason: SDUPTHER

## 2022-06-08 RX ORDER — LIDOCAINE AND PRILOCAINE 25; 25 MG/G; MG/G
CREAM TOPICAL
Qty: 5 G | Refills: 1 | Status: SHIPPED | OUTPATIENT
Start: 2022-06-08

## 2022-06-08 RX ORDER — FAMOTIDINE 10 MG/ML
20 INJECTION, SOLUTION INTRAVENOUS ONCE
Status: CANCELLED | OUTPATIENT
Start: 2022-06-08 | End: 2022-06-08

## 2022-06-08 RX ADMIN — SODIUM CHLORIDE, PRESERVATIVE FREE 10 ML: 5 INJECTION INTRAVENOUS at 15:51

## 2022-06-08 RX ADMIN — ONDANSETRON 8 MG: 2 INJECTION INTRAMUSCULAR; INTRAVENOUS at 14:14

## 2022-06-08 RX ADMIN — SODIUM CHLORIDE 100 ML/HR: 9 INJECTION, SOLUTION INTRAVENOUS at 14:14

## 2022-06-08 RX ADMIN — FOSAPREPITANT 150 MG: 150 INJECTION, POWDER, LYOPHILIZED, FOR SOLUTION INTRAVENOUS at 14:40

## 2022-06-08 RX ADMIN — CARBOPLATIN 263 MG: 10 INJECTION, SOLUTION INTRAVENOUS at 15:15

## 2022-06-08 RX ADMIN — Medication 10 ML: at 12:42

## 2022-06-08 RX ADMIN — DEXAMETHASONE SODIUM PHOSPHATE 12 MG: 4 INJECTION, SOLUTION INTRAMUSCULAR; INTRAVENOUS at 14:18

## 2022-06-08 ASSESSMENT — PATIENT HEALTH QUESTIONNAIRE - PHQ9
1. LITTLE INTEREST OR PLEASURE IN DOING THINGS: 0
SUM OF ALL RESPONSES TO PHQ QUESTIONS 1-9: 0
SUM OF ALL RESPONSES TO PHQ QUESTIONS 1-9: 0
2. FEELING DOWN, DEPRESSED OR HOPELESS: 0
SUM OF ALL RESPONSES TO PHQ QUESTIONS 1-9: 0
SUM OF ALL RESPONSES TO PHQ QUESTIONS 1-9: 0
SUM OF ALL RESPONSES TO PHQ9 QUESTIONS 1 & 2: 0

## 2022-06-08 ASSESSMENT — ENCOUNTER SYMPTOMS
RESPIRATORY NEGATIVE: 1
ALLERGIC/IMMUNOLOGIC NEGATIVE: 1
EYES NEGATIVE: 1
GASTROINTESTINAL NEGATIVE: 1

## 2022-06-08 NOTE — PROGRESS NOTES
Arrived to the [facility]. Carboplatin completed. Patient tolerated without problems. Any issues or concerns during appointment: no.  Patient aware of next infusion appointment on 6/29/22 (date) at 80 (time)  Patient instructed to call provider with temperature of 100.4 or greater or nausea/vomiting/ diarrhea or pain not controlled by medications  Discharged via [facility] with family.

## 2022-06-08 NOTE — PROGRESS NOTES
[facility] Carboplatin completed. Patient tolerated without problems. Any issues or concerns during appointment: no.  Patient aware of next infusion appointment on 6/29/22 (date) at 80 (time). Patient instructed to call provider with temperature of 100.4 or greater or nausea/vomiting/ diarrhea or pain not controlled by medications  Discharged via [facility] with family.

## 2022-06-08 NOTE — PATIENT INSTRUCTIONS
Patient Instructions from Today's Visit    Reason for Visit:  PreTreatment    Diagnosis Information:  [email or web address]      Plan:  Treatment as planned however Carbo alone-chemistries pending    Follow Up:  3 weeks with scan    Recent Lab Results:  Hospital Outpatient Visit on 06/08/2022   Component Date Value Ref Range Status    WBC 06/08/2022 8.3  4.3 - 11.1 K/uL Final    RBC 06/08/2022 3.73* 4.05 - 5.2 M/uL Final    Hemoglobin 06/08/2022 11.1* 11.7 - 15.4 g/dL Final    Hematocrit 06/08/2022 35.2* 35.8 - 46.3 % Final    MCV 06/08/2022 94.4  79.6 - 97.8 FL Final    MCH 06/08/2022 29.8  26.1 - 32.9 PG Final    MCHC 06/08/2022 31.5  31.4 - 35.0 g/dL Final    RDW 06/08/2022 15.9* 11.9 - 14.6 % Final    Platelets 31/75/7497 243  150 - 450 K/uL Final    MPV 06/08/2022 8.5* 9.4 - 12.3 FL Final    nRBC 06/08/2022 0.00  0.0 - 0.2 K/uL Final    **Note: Absolute NRBC parameter is now reported with Hemogram**    Seg Neutrophils 06/08/2022 55  43 - 78 % Final    Lymphocytes 06/08/2022 32  13 - 44 % Final    Monocytes 06/08/2022 9  4.0 - 12.0 % Final    Eosinophils % 06/08/2022 2  0.5 - 7.8 % Final    Basophils 06/08/2022 1  0.0 - 2.0 % Final    Immature Granulocytes 06/08/2022 1  0.0 - 5.0 % Final    Segs Absolute 06/08/2022 4.6  1.7 - 8.2 K/UL Final    Absolute Lymph # 06/08/2022 2.7  0.5 - 4.6 K/UL Final    Absolute Mono # 06/08/2022 0.8  0.1 - 1.3 K/UL Final    Absolute Eos # 06/08/2022 0.2  0.0 - 0.8 K/UL Final    Basophils Absolute 06/08/2022 0.0  0.0 - 0.2 K/UL Final    Absolute Immature Granulocyte 06/08/2022 0.1  0.0 - 0.5 K/UL Final    Differential Type 06/08/2022 AUTOMATED    Final         Treatment Summary has been discussed and given to patient: na/        -------------------------------------------------------------------------------------------------------------------     Patient does express an interest in My Chart. My Chart log in information explained on the after visit summary printout at the [facility] As[facility] Rene [address] desk.     Cordell Lee, RN, MSN, OCN  Gynecology Nurse Navigator for Dr. Guido Isaac  [address]  Phone:  [phone]  Fax: [phone]  Email: [email or web address]

## 2022-06-08 NOTE — PROGRESS NOTES
_ Port accessed per protocol with a 0.75 griffiths needle. Flushed with normal saline 10cc. Positive blood return. Labs drawn per order. Flushed with 10cc of normal saline and discharged via wheel chair      hep locked no.    Still accessed yes   Dressing applied yes

## 2022-06-09 NOTE — ADDENDUM NOTE
Encounter addended by: Sherrie Bartlett RN on: 6/9/2022 3:33 PM   Actions taken: Charge Capture section accepted

## 2022-06-27 ENCOUNTER — HOSPITAL ENCOUNTER (OUTPATIENT)
Dept: CT IMAGING | Age: 87
Discharge: HOME | End: 2022-06-30
Payer: MEDICARE

## 2022-06-27 DIAGNOSIS — C54.9 MIXED MULLERIAN TUMOR (HCC): ICD-10-CM

## 2022-06-27 DIAGNOSIS — C54.9 MIXED MULLERIAN TUMOR (HCC): Primary | ICD-10-CM

## 2022-06-27 PROCEDURE — 74177 CT ABD & PELVIS W/CONTRAST: CPT

## 2022-06-27 PROCEDURE — 2580000003 HC RX 258: Performed by: OBSTETRICS & GYNECOLOGY

## 2022-06-27 PROCEDURE — 6360000004 HC RX CONTRAST MEDICATION: Performed by: OBSTETRICS & GYNECOLOGY

## 2022-06-27 PROCEDURE — 6360000002 HC RX W HCPCS: Performed by: OBSTETRICS & GYNECOLOGY

## 2022-06-27 RX ORDER — HEPARIN SODIUM (PORCINE) LOCK FLUSH IV SOLN 100 UNIT/ML 100 UNIT/ML
SOLUTION INTRAVENOUS
Status: COMPLETED | OUTPATIENT
Start: 2022-06-27 | End: 2022-06-27

## 2022-06-27 RX ORDER — 0.9 % SODIUM CHLORIDE 0.9 %
100 INTRAVENOUS SOLUTION INTRAVENOUS
Status: COMPLETED | OUTPATIENT
Start: 2022-06-27 | End: 2022-06-27

## 2022-06-27 RX ORDER — SODIUM CHLORIDE 0.9 % (FLUSH) 0.9 %
10 SYRINGE (ML) INJECTION
Status: COMPLETED | OUTPATIENT
Start: 2022-06-27 | End: 2022-06-27

## 2022-06-27 RX ADMIN — HEPARIN 500 UNITS: 100 SYRINGE at 14:37

## 2022-06-27 RX ADMIN — SODIUM CHLORIDE 100 ML: 9 INJECTION, SOLUTION INTRAVENOUS at 14:12

## 2022-06-27 RX ADMIN — DIATRIZOATE MEGLUMINE AND DIATRIZOATE SODIUM 15 ML: 660; 100 LIQUID ORAL; RECTAL at 14:12

## 2022-06-27 RX ADMIN — SODIUM CHLORIDE, PRESERVATIVE FREE 10 ML: 5 INJECTION INTRAVENOUS at 14:12

## 2022-06-27 RX ADMIN — IOPAMIDOL 100 ML: 755 INJECTION, SOLUTION INTRAVENOUS at 14:12

## 2022-06-28 ASSESSMENT — ENCOUNTER SYMPTOMS
GASTROINTESTINAL NEGATIVE: 1
EYES NEGATIVE: 1
ALLERGIC/IMMUNOLOGIC NEGATIVE: 1
RESPIRATORY NEGATIVE: 1

## 2022-06-28 NOTE — PROGRESS NOTES
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 ?, imgiang march 2022    Only symptom at time rectal bleeding, resolved. History of Present Illness   Clarisa Pratt is a 82 y. o. 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 Pelon Nazario, on nocturnal home  O2), NIDDM, htn, OA, right ankle surgery after fracture, hospitalization 2/8/22 for Covid-19 infection, UTI, dehydration. More recently seen by them and underwent PET scan after a more recent CT abd (reportedly done for abdominal discomfort and blood per rectum) reportedly had shown abdominal mass. PET scan revealed hypermetabolic multiple abdominal masses as well as right  cardiophrenic mass concerning for malignancy including lymphoma. A hypermetabolic 4.1 cm fluid and gas containing collection located at rectosigmoid junction and uterine fundus concerning for possible abscess also noted. A mildly hypermetabolic consolidative  appearing opacities in the right greater than left lung most consistent with subacute infection/inflammatory process also noted. Denies any recent fevers or drenching night sweats, appears non-toxic.        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      Ca 125 highly elevated    Past Medical History     Past Medical History:   Diagnosis Date    [facility] [address] [facility]     Depression     Diabetes [facility] [address]     Hypertension         Past Surgical History Past Surgical History:   Procedure Laterality Date    ANKLE FRACTURE SURGERY      ankle broken    IR PORT PLACEMENT EQUAL OR GREATER THAN 5 YEARS  5/11/2022    IR PORT PLACEMENT EQUAL OR GREATER THAN 5 YEARS  5/11/2022    IR PORT PLACEMENT EQUAL OR GREATER THAN 5 YEARS 5/11/2022 [facility] SPECI[facility]    US ABDOMINAL MASS BIOPSY PERCUTANEOUS  4/28/2022    US ABDOMINAL MASS BIOPSY PERCUTANEOUS 4/28/2022 [facility]OLOG[facility] US        Medications      Current Outpatient Medications:     prochlorperazine (COMPAZINE) 5 MG tablet, TAKE 1 TABLET BY MOUTH EVERY 6 HOURS AS NEEDED FOR NAUSEA OR VOMITING, Disp: , Rfl:     ondansetron (ZOFRAN-ODT) 4 MG disintegrating tablet, , Disp: , Rfl:     traMADol (ULTRAM) 50 MG tablet, , Disp: , Rfl:     lidocaine-prilocaine (EMLA) 2.5-2.5 % cream, Apply a dime size topically to port as needed 45 minutes prior to your lab or infusion appointment. , Disp: 5 g, Rfl: 1    albuterol sulfate HFA (VENTOLIN HFA) 108 (90 Base) MCG/ACT inhaler, INHALE 2 PUFFS BY MOUTH EVERY 6 HOURS AS NEEDED, Disp: , Rfl:     aspirin 81 MG chewable tablet, Take 81 mg by mouth daily, Disp: , Rfl:     calcium carbonate-vitamin D 600-200 MG-UNIT TABS, Take 2 tablets by mouth daily, Disp: , Rfl:     cyanocobalamin 500 MCG tablet, Take 500 mcg by mouth daily, Disp: , Rfl:     glimepiride (AMARYL) 1 MG tablet, Take 1 mg by mouth daily, Disp: , Rfl:     ibuprofen (ADVIL;MOTRIN) 200 MG tablet, Take 3 tablets by mouth as needed, Disp: , Rfl:     lisinopril-hydroCHLOROthiazide (PRINZIDE;ZESTORETIC) 10-12.5 MG per tablet, Take 1 tablet by mouth daily, Disp: , Rfl:     LORazepam (ATIVAN) 0.5 MG tablet, Take 0.5 mg by mouth every 8 hours as needed.  , Disp: , Rfl:     Omega-3 Fatty Acids (FISH OIL) 1000 MG CAPS, Take 1 capsule by mouth daily, Disp: , Rfl:     umeclidinium-vilanterol (ANORO ELLIPTA) 62.5-25 MCG/INH AEPB inhaler, INHALE 1 PUFF BY MOUTH EVERY DAY, Disp: , Rfl:      Allergies   Metformin and Penicillins    Social History     Social History     Tobacco History     Smoking Status  Former Smoker    Smokeless Tobacco Use  Never Used          Alcohol History     Alcohol Use Status  Never          Drug Use     Drug Use Status  Never          Sexual Activity     Sexually Active  Not Asked                Family History     Family History   Problem Relation Age of Onset    Colon Cancer Sister     Cancer Sister         Bladder    Cancer Brother         bladder       Review of Systems   Review of Systems   Constitutional: Positive for fatigue. HENT: Negative. Eyes: Negative. Respiratory: Negative. Cardiovascular: Negative. Gastrointestinal: Negative. Endocrine: Negative. Genitourinary: Negative. Musculoskeletal: Negative. Skin: Negative. Allergic/Immunologic: Negative. Neurological: Negative. Hematological: Negative. Psychiatric/Behavioral: Negative. All other systems reviewed and are negative. Physical Exam     ECOG PERFORMANCE STATUS - 2 - Ambulatory and capable of all selfcare but unable to carry out any work activities. Up and about more than 50% of waking hours. Blood pressure 136/81, pulse 78, temperature 98.2 °F (36.8 °C), resp. rate 16, height 5' 2.5\" (1.588 m), weight 180 lb 4.8 oz (81.8 kg), SpO2 99 %. Physical Exam  Vitals and nursing note reviewed. Exam conducted with a chaperone present. Constitutional:       Appearance: Normal appearance. HENT:      Head: Normocephalic and atraumatic. Nose: No congestion. Cardiovascular:      Rate and Rhythm: Normal rate and regular rhythm. Pulses: Normal pulses. Heart sounds: Normal heart sounds. Pulmonary:      Effort: Pulmonary effort is normal.      Breath sounds: Normal breath sounds. Abdominal:      General: Abdomen is flat. Palpations: There is no mass. Musculoskeletal:         General: No swelling. Normal range of motion. Cervical back: Normal range of motion. Skin:     General: Skin is warm and dry. Neurological:      Mental Status: She is alert. Psychiatric:         Mood and Affect: Mood normal.         Behavior: Behavior normal.         Thought Content:  Thought content normal.         Judgment: Judgment normal.         Labs        Recent Results (from the past 48 hour(s))   CBC with Auto Differential    Collection Time: 06/29/22 10:09 AM   Result Value Ref Range    WBC 5.8 4.3 - 11.1 K/uL    RBC 3.76 (L) 4.05 - 5.2 M/uL    Hemoglobin 11.3 (L) 11.7 - 15.4 g/dL    Hematocrit 35.8 35.8 - 46.3 %    MCV 95.2 79.6 - 97.8 FL    MCH 30.1 26.1 - 32.9 PG    MCHC 31.6 31.4 - 35.0 g/dL    RDW 16.3 (H) 11.9 - 14.6 %    Platelets 834 081 - 510 K/uL    MPV 8.5 (L) 9.4 - 12.3 FL    nRBC 0.00 0.0 - 0.2 K/uL    Differential Type AUTOMATED      Seg Neutrophils 54 43 - 78 %    Lymphocytes 31 13 - 44 %    Monocytes 12 4.0 - 12.0 %    Eosinophils % 2 0.5 - 7.8 %    Basophils 0 0.0 - 2.0 %    Immature Granulocytes 1 0.0 - 5.0 %    Segs Absolute 3.1 1.7 - 8.2 K/UL    Absolute Lymph # 1.8 0.5 - 4.6 K/UL    Absolute Mono # 0.7 0.1 - 1.3 K/UL    Absolute Eos # 0.1 0.0 - 0.8 K/UL    Basophils Absolute 0.0 0.0 - 0.2 K/UL    Absolute Immature Granulocyte 0.1 0.0 - 0.5 K/UL   Comprehensive Metabolic Panel    Collection Time: 06/29/22 10:09 AM   Result Value Ref Range    Sodium 137 136 - 145 mmol/L    Potassium 4.3 3.5 - 5.1 mmol/L    Chloride 103 98 - 107 mmol/L    CO2 27 21 - 32 mmol/L    Anion Gap 7 7 - 16 mmol/L    Glucose 77 65 - 100 mg/dL    BUN 20 8 - 23 MG/DL    CREATININE 1.40 (H) 0.6 - 1.0 MG/DL    GFR  46 (L) >60 ml/min/1.73m2    GFR Non- 38 (L) >60 ml/min/1.73m2    Calcium 9.4 8.3 - 10.4 MG/DL    Total Bilirubin 0.2 0.2 - 1.1 MG/DL    ALT 23 12 - 65 U/L    AST 22 15 - 37 U/L    Alk Phosphatase 100 50 - 136 U/L    Total Protein 8.6 (H) 6.3 - 8.2 g/dL    Albumin 3.2 3.2 - 4.6 g/dL    Globulin 5.4 (H) 2.3 - 3.5 g/dL    Albumin/Globulin Ratio 0.6 (L) 1.2 - 3.5     Magnesium    Collection Time: 06/29/22 10:09 AM   Result Value Ref Range    Magnesium 2.3 1.8 - 2.4 mg/dL   Cancer Antigen 125    Collection Time: 06/29/22 10:09 AM   Result Value Ref Range     546 (H) 1.5 - 35.0 U/mL        Lab Results   Component Value Date     546 (H) 06/29/2022             Component      Latest Ref Rng & Units 6/8/2022 5/18/2022          12:34 PM  1:25 PM   ,C125      1.5 - 35.0 U/mL 2479 (H) 3,987 (H)         Pathology      Name: Tamiko Lundy   Accession Number:   [id number]   MR #   [id number]   Date Obtained:   4/28/2022              \"ABDOMINAL MASS\":  ADENOCARCINOMA, CONSISTENT WITH MULLERIAN ORIGIN. Sign Out Date: 5/3/2022  Richard Moe MD                           STF- ER/AR/AMO4KFA BY IHC                                                                                   [facility]:  [facility] Edson Moe MD on 5/2/2022                                 Interpretation                       Sequence Flow Cytometric Analysis     Diagnosis:     No flow immunophenotypic evidence of a lymphoproliferative disorder.           See full report in [facility] as errors can occur when results are   imbedded into this report.                                 STF-IMMUNOHISTOCHEMISTRY                                                                                   Status:  [facility]  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  Dr. Brittni Anand concurs.       Antibody/Test               Marker For                              Result   Keratin AE1/AE3             Broad spectrum epithelial marker                  Positive   Cytokeratin 7               Lung, breast, upper GE, serous ovary                  Positive   Cytokeratin 20               Colon, mucinous ovary, urothelium                  Negative   CDX2                    Gastrointestinal carcinomas                       Negative   GATA3               Urothelial, breast carcinoma                       Negative   Calretinin                Mesothelioma                                 Negative   ER                    Estrogen receptor                          Positive   Napsin                     Lung adenocarcinoma                           Negative   Bowie-8                    Renal cell carcinoma                           Positive   TTF-1                     Lung and thyroid adenocarcinoma                 Negative   WT-1                    Serous carcinoma, mesothelioma                 Positive   Imaging/Diagnostics Last 24 Hours   No results found. Radiology:    CT Result (most recent):  CT CHEST ABDOMEN PELVIS W CONTRAST 06/27/2022    Narrative  CT CHEST, ABDOMEN AND PELVIS WITH INTRAVENOUS CONTRAST DATED 6/27/2022. History: Follow-up mixed mullerian tumor. Comparison: PET scan 3/29/2022    Technique:   Multiple contiguous helical CT images reconstructed at 5 mm were  obtained from the base of the neck to the ischial tuberosities following oral  and 100 cc Isovue-370 without acute complication. All CT scans performed at  this facility use one or all of the following: Automated exposure control,  adjustment of the mA and/or kVp according to patient's size, iterative  reconstruction. Findings:  CT Chest:  The base of the neck is unremarkable in appearance. No evolving axillary,  mediastinal, or hilar lymphadenopathy is seen. The prior hypermetabolic  epicardial nodule likely representing an epicardial lymph node has decreased in  size now seen on image 44 measuring 9 mm and previously measured 16 mm when  measured using a similar technique. An additional hypermetabolic nodule is seen  in the right lateral chest wall at the ninth intercostal space.  Nodular density  at this level has not clearly changed best appreciated on axial image 55. The  thoracic aorta is normal in caliber. Evaluation with lung windows demonstrates no new suspicious pulmonary lesion. Only improving inflammatory appearing changes are seen in the right lower lobe  with only mild residual likely scarring now seen on axial image 28. No pleural  effusion is seen. Lungs are expanded without evidence for pneumothorax. No  evolving aggressive osseous lesion is seen. CT ABDOMEN:  The Liver is homogeneous in attenuation. The spleen is homogeneous in  attenuation. No contour deforming or enhancing mass lesions are seen of the  pancreas or adrenal glands. The gallbladder infiltrates multiple faint stones  without acute inflammatory changes. The kidneys enhance symmetrically and no  evidence of hydronephrosis is seen. The visualized loops of small bowel and colon are normal in caliber. No free  fluid and no free air is seen in the abdomen. No evolving adenopathy is seen of  the abdomen. Rather, some improvement is seen in all prior hypermetabolic  mesenteric masses when remeasured using similar technique. The prior 3.2 cm left  periaortic soft tissue mass is now seen on image 60 measuring 2.3 cm, the prior  3.7 cm mass adjacent to the pancreatic head is now seen on axial image 16  measuring 1.9 cm, the prior 2.6 cm left para-aortic mass is now seen on axial  image 60 measuring 2.3 cm, the prior 3.5 cm left anterior peritoneal mass is now  seen on axial image 72 measuring 3.1 cm, the prior 2.8 cm central left abdominal  mesenteric mass is now seen on axial image 73 measuring 1.7 cm, and the prior  8.9 cm x 5.1 cm central right lower quadrant mass is now seen on axial image 88  measuring 5.5 cm x 4.3 cm. The abdominal aorta demonstrates advanced  atherosclerotic calcification. No evolving aggressive osseous lesion is seen. CT PELVIS:  No abnormal pelvic fluid collections are present.   No evolving pelvic adenopathy  is seen. Improving hypermetabolic masses are seen in the right pelvis. The prior  6.4 cm x 5 cm mass now measures 3.9 cm x 2.3 cm. Hypermetabolic tumor extends to  the uterus. Prior abnormal gas within the endometrium persist although this does  appear improved. The urinary bladder is unremarkable. No evolving aggressive  osseous lesion is seen. Impression  1. Improving chest soft tissue, mesenteric and pelvic masses as described  above. Only one small right lateral chest wall soft tissue nodule shows no  significant interval change. In addition, improving pulmonary parenchymal  changes are seen in the right lower lobe although the prior appearance favors a  benign inflammatory process. No findings concerning for disease progression are  seen at any level. This report was made using voice transcription. Despite my best efforts to avoid  any, transcription errors may persist. If there is any question about the  accuracy of the report or need for clarification, then please call [phone], or text me through perfectserv for clarification or correction. PET Results (most recent):  PET CT SKULL BASE TO MID THIGH 03/29/2022    Narrative  PET/CT    Indication: Abdominal mass on outside CT    Radiopharmaceutical: 14.4 mCi F18-FDG, intravenously. Technique: Imaging was performed from the skull through the proximal thighs  using routine PET/CT acquisition protocol. Imaging was performed approximately  60 minutes post injection. Oral contrast was administered. Radiation dose  reduction techniques were used for this study:  Our CT scanners use one or all  of the following: Automated exposure control, adjustment of the mA and/or kVp  according to patient's size, iterative reconstruction. Serum glucose: 93 mg/dL prior to injection. Comparison studies: None available. Findings:    Head and Neck: No enlarged or hypermetabolic lymph nodes within the neck.     Chest: A lymph node in the right cardiophrenic fat is hypermetabolic, measuring  1.6 cm in maximum SUV of 10.9. Hypermetabolic nodular and slightly consolidative appearing opacities in the  posterior right upper and lower lobes and to a lesser extent the left lower  lobe. Abdomen/Pelvis: Hypermetabolic solid masses throughout the abdomen index as  follows:  Peripancreatic/periportal mass measuring 3.7 cm, maximum SUV 20.3.  3.2 cm mass medially to the gastric fundus, maximum SUV 13.2  Left para-aortic mass measuring 2.6 cm, maximum SUV 15.6. Omental mass measuring 3.5 cm, maximum SUV 13.3. Left mesenteric mass measuring 2.8 cm, maximum SUV 16.7. Right lower quadrant mass measuring 8.9 x 5.1 cm, maximum SUV 19.5. Right pelvic sidewall mass measuring 6.4 x 5.0 cm, axillary SUV 26. Hypermetabolic gas and fluid containing collection measuring 4.1 cm adjacent to  the sigmoid colon contacting the uterine fundus. Impression  1. Hypermetabolic right cardiophrenic and abdominal masses as above concerning  for malignancy such as lymphoma. 2.  Hypermetabolic 4.1 cm fluid and gas-containing collection located between  the rectosigmoid junction and uterine fundus concerning for a possible abscess  or pyomyoma. Recommend correlation for any evidence of infection. 3.  Mildly hypermetabolic nodular and slightly consolidative appearing opacities  in the right greater than left lungs appearance most consistent with a subacute  infectious/inflammatory process. The study was referred to the imaging navigator to expedite delivery of results. 12    Mammo Results (most recent):  No results found for this or any previous visit from the past 365 days. US Result (most recent):  US ABDOMINAL MASS BIOPSY PERCUTANEOUS 04/28/2022    Narrative  History: Abdominal masses    Consent: Informed written and oral consent was obtained from the patient after  explanation of benefits and risks (including, but not limited to:  Infection,  Hemorrhage, and Visceral injury) of procedure. The patient's questions were  answered and requested that we proceed. Procedure:  Sterile barrier technique (including:  cap, mask, sterile gloves,  sterile sheet, hand hygiene, and chlorhexidene for cutaneous antisepsis) were  used. Following routine prep and drape of the upper abdomen, a local field  block with 1% lidocaine was achieved. Using real-time ultrasound guidance, a 17  gauge needle was advanced into the superficial aspect of the left upper quadrant  abdominal mass. Through this base needle, 4 18 gauge core biopsies were  performed. The postprocedure ultrasound demonstrates no evidence of hematoma. Complications: None. Medications:  Under physician supervision, intraservice time of 17 minutes of  moderate intravenous conscious sedation was performed by administering Versed,  Fentanyl, intravenously. Continuous pulse oximetry, heart rate, and blood  pressure monitoring was performed with an independent, trained observer present. Impression  Technically successful ultrasound guided left upper quadrant  abdominal mass biopsy. The gas and fluid filled focus in the pelvis is not  amenable to percutaneous drainage or biopsy. Specimen: Sent to cytopathology. Plan: The patient will recover for approximately 1 hour         Assessment       Diagnosis Orders   1. Malignant neoplasm of ovary, unspecified laterality (HCC)     2. Elevated cancer antigen 125 ()     3.  Encounter for chemotherapy management       Specialty Problems        Oncology Problems    Ovary cancer              Plan   1.proceed with carbo  Imaging prior to cycle 5, evaluate for response  Cont with pcp  Cont antiemetics  Cont pain meds prn, pt reports enough until next visit  Fu 3 weeks or prn, imaigng prior to visti    dw pt and family add on therapy to current, with increasing fatigue issues and advanced dx, recommended addition avastin with plan continued avastin maintanence after 6 cycles cytotoxic   rba to addtion including perforation/mortality reviewed                  Maury Boles MD  [facility]  [address] [id number]  Office : [phone]  Fax : [phone]

## 2022-06-29 ENCOUNTER — HOSPITAL ENCOUNTER (OUTPATIENT)
Dept: LAB | Age: 87
Discharge: HOME | End: 2022-07-02
Payer: MEDICARE

## 2022-06-29 ENCOUNTER — HOSPITAL ENCOUNTER (OUTPATIENT)
Dept: INFUSION THERAPY | Age: 87
Discharge: HOME | End: 2022-06-29
Payer: MEDICARE

## 2022-06-29 ENCOUNTER — OFFICE VISIT (OUTPATIENT)
Dept: ONCOLOGY | Age: 87
End: 2022-06-29
Payer: MEDICARE

## 2022-06-29 VITALS
WEIGHT: 180.3 LBS | HEART RATE: 78 BPM | SYSTOLIC BLOOD PRESSURE: 136 MMHG | TEMPERATURE: 98.2 F | DIASTOLIC BLOOD PRESSURE: 81 MMHG | BODY MASS INDEX: 31.95 KG/M2 | HEIGHT: 63 IN | RESPIRATION RATE: 16 BRPM | OXYGEN SATURATION: 99 %

## 2022-06-29 DIAGNOSIS — C54.9 MIXED MULLERIAN TUMOR (HCC): ICD-10-CM

## 2022-06-29 DIAGNOSIS — Z51.11 ENCOUNTER FOR CHEMOTHERAPY MANAGEMENT: ICD-10-CM

## 2022-06-29 DIAGNOSIS — C56.9 MALIGNANT NEOPLASM OF OVARY, UNSPECIFIED LATERALITY (HCC): Primary | ICD-10-CM

## 2022-06-29 DIAGNOSIS — R97.1 ELEVATED CANCER ANTIGEN 125 (CA 125): ICD-10-CM

## 2022-06-29 DIAGNOSIS — C54.9 MIXED MULLERIAN TUMOR (HCC): Primary | ICD-10-CM

## 2022-06-29 LAB
ALBUMIN SERPL-MCNC: 3.2 G/DL (ref 3.2–4.6)
ALBUMIN/GLOB SERPL: 0.6 {RATIO} (ref 1.2–3.5)
ALP SERPL-CCNC: 100 U/L (ref 50–136)
ALT SERPL-CCNC: 23 U/L (ref 12–65)
ANION GAP SERPL CALC-SCNC: 7 MMOL/L (ref 7–16)
AST SERPL-CCNC: 22 U/L (ref 15–37)
BASOPHILS # BLD: 0 K/UL (ref 0–0.2)
BASOPHILS NFR BLD: 0 % (ref 0–2)
BILIRUB SERPL-MCNC: 0.2 MG/DL (ref 0.2–1.1)
BUN SERPL-MCNC: 20 MG/DL (ref 8–23)
CALCIUM SERPL-MCNC: 9.4 MG/DL (ref 8.3–10.4)
CANCER AG125 SERPL-ACNC: 546 U/ML (ref 1.5–35)
CHLORIDE SERPL-SCNC: 103 MMOL/L (ref 98–107)
CO2 SERPL-SCNC: 27 MMOL/L (ref 21–32)
CREAT SERPL-MCNC: 1.4 MG/DL (ref 0.6–1)
DIFFERENTIAL METHOD BLD: ABNORMAL
EOSINOPHIL # BLD: 0.1 K/UL (ref 0–0.8)
EOSINOPHIL NFR BLD: 2 % (ref 0.5–7.8)
ERYTHROCYTE [DISTWIDTH] IN BLOOD BY AUTOMATED COUNT: 16.3 % (ref 11.9–14.6)
GLOBULIN SER CALC-MCNC: 5.4 G/DL (ref 2.3–3.5)
GLUCOSE SERPL-MCNC: 77 MG/DL (ref 65–100)
HCT VFR BLD AUTO: 35.8 % (ref 35.8–46.3)
HGB BLD-MCNC: 11.3 G/DL (ref 11.7–15.4)
IMM GRANULOCYTES # BLD AUTO: 0.1 K/UL (ref 0–0.5)
IMM GRANULOCYTES NFR BLD AUTO: 1 % (ref 0–5)
LYMPHOCYTES # BLD: 1.8 K/UL (ref 0.5–4.6)
LYMPHOCYTES NFR BLD: 31 % (ref 13–44)
MAGNESIUM SERPL-MCNC: 2.3 MG/DL (ref 1.8–2.4)
MCH RBC QN AUTO: 30.1 PG (ref 26.1–32.9)
MCHC RBC AUTO-ENTMCNC: 31.6 G/DL (ref 31.4–35)
MCV RBC AUTO: 95.2 FL (ref 79.6–97.8)
MONOCYTES # BLD: 0.7 K/UL (ref 0.1–1.3)
MONOCYTES NFR BLD: 12 % (ref 4–12)
NEUTS SEG # BLD: 3.1 K/UL (ref 1.7–8.2)
NEUTS SEG NFR BLD: 54 % (ref 43–78)
NRBC # BLD: 0 K/UL (ref 0–0.2)
PLATELET # BLD AUTO: 201 K/UL (ref 150–450)
PMV BLD AUTO: 8.5 FL (ref 9.4–12.3)
POTASSIUM SERPL-SCNC: 4.3 MMOL/L (ref 3.5–5.1)
PROT SERPL-MCNC: 8.6 G/DL (ref 6.3–8.2)
RBC # BLD AUTO: 3.76 M/UL (ref 4.05–5.2)
SODIUM SERPL-SCNC: 137 MMOL/L (ref 136–145)
WBC # BLD AUTO: 5.8 K/UL (ref 4.3–11.1)

## 2022-06-29 PROCEDURE — 1123F ACP DISCUSS/DSCN MKR DOCD: CPT | Performed by: OBSTETRICS & GYNECOLOGY

## 2022-06-29 PROCEDURE — 96367 TX/PROPH/DG ADDL SEQ IV INF: CPT

## 2022-06-29 PROCEDURE — 2580000003 HC RX 258: Performed by: OBSTETRICS & GYNECOLOGY

## 2022-06-29 PROCEDURE — 96375 TX/PRO/DX INJ NEW DRUG ADDON: CPT

## 2022-06-29 PROCEDURE — A4216 STERILE WATER/SALINE, 10 ML: HCPCS | Performed by: OBSTETRICS & GYNECOLOGY

## 2022-06-29 PROCEDURE — G8400 PT W/DXA NO RESULTS DOC: HCPCS | Performed by: OBSTETRICS & GYNECOLOGY

## 2022-06-29 PROCEDURE — 80053 COMPREHEN METABOLIC PANEL: CPT

## 2022-06-29 PROCEDURE — 36415 COLL VENOUS BLD VENIPUNCTURE: CPT

## 2022-06-29 PROCEDURE — G8427 DOCREV CUR MEDS BY ELIG CLIN: HCPCS | Performed by: OBSTETRICS & GYNECOLOGY

## 2022-06-29 PROCEDURE — 86304 IMMUNOASSAY TUMOR CA 125: CPT

## 2022-06-29 PROCEDURE — 2500000003 HC RX 250 WO HCPCS: Performed by: OBSTETRICS & GYNECOLOGY

## 2022-06-29 PROCEDURE — 83735 ASSAY OF MAGNESIUM: CPT

## 2022-06-29 PROCEDURE — 6360000002 HC RX W HCPCS: Performed by: OBSTETRICS & GYNECOLOGY

## 2022-06-29 PROCEDURE — 1036F TOBACCO NON-USER: CPT | Performed by: OBSTETRICS & GYNECOLOGY

## 2022-06-29 PROCEDURE — G8417 CALC BMI ABV UP PARAM F/U: HCPCS | Performed by: OBSTETRICS & GYNECOLOGY

## 2022-06-29 PROCEDURE — 85025 COMPLETE CBC W/AUTO DIFF WBC: CPT

## 2022-06-29 PROCEDURE — 96413 CHEMO IV INFUSION 1 HR: CPT

## 2022-06-29 PROCEDURE — 99214 OFFICE O/P EST MOD 30 MIN: CPT | Performed by: OBSTETRICS & GYNECOLOGY

## 2022-06-29 PROCEDURE — 1090F PRES/ABSN URINE INCON ASSESS: CPT | Performed by: OBSTETRICS & GYNECOLOGY

## 2022-06-29 RX ORDER — SODIUM CHLORIDE 0.9 % (FLUSH) 0.9 %
5-40 SYRINGE (ML) INJECTION PRN
Status: CANCELLED | OUTPATIENT
Start: 2022-06-29

## 2022-06-29 RX ORDER — SODIUM CHLORIDE 9 MG/ML
5-40 INJECTION INTRAVENOUS PRN
Status: CANCELLED | OUTPATIENT
Start: 2022-06-29

## 2022-06-29 RX ORDER — SODIUM CHLORIDE 9 MG/ML
5-250 INJECTION, SOLUTION INTRAVENOUS PRN
Status: DISCONTINUED | OUTPATIENT
Start: 2022-06-29 | End: 2022-06-30 | Stop reason: HOSPADM

## 2022-06-29 RX ORDER — FAMOTIDINE 10 MG/ML
20 INJECTION, SOLUTION INTRAVENOUS ONCE
Status: CANCELLED | OUTPATIENT
Start: 2022-06-29 | End: 2022-06-29

## 2022-06-29 RX ORDER — EPINEPHRINE 1 MG/ML
0.3 INJECTION, SOLUTION, CONCENTRATE INTRAVENOUS PRN
Status: CANCELLED | OUTPATIENT
Start: 2022-06-29

## 2022-06-29 RX ORDER — DIPHENHYDRAMINE HYDROCHLORIDE 50 MG/ML
50 INJECTION INTRAMUSCULAR; INTRAVENOUS ONCE
Status: COMPLETED | OUTPATIENT
Start: 2022-06-29 | End: 2022-06-29

## 2022-06-29 RX ORDER — SODIUM CHLORIDE 9 MG/ML
INJECTION, SOLUTION INTRAVENOUS CONTINUOUS
Status: CANCELLED | OUTPATIENT
Start: 2022-06-29

## 2022-06-29 RX ORDER — MEPERIDINE HYDROCHLORIDE 50 MG/ML
12.5 INJECTION INTRAMUSCULAR; INTRAVENOUS; SUBCUTANEOUS PRN
Status: CANCELLED | OUTPATIENT
Start: 2022-06-29

## 2022-06-29 RX ORDER — ONDANSETRON 2 MG/ML
8 INJECTION INTRAMUSCULAR; INTRAVENOUS ONCE
Status: CANCELLED | OUTPATIENT
Start: 2022-06-29 | End: 2022-06-29

## 2022-06-29 RX ORDER — HEPARIN SODIUM (PORCINE) LOCK FLUSH IV SOLN 100 UNIT/ML 100 UNIT/ML
500 SOLUTION INTRAVENOUS PRN
Status: CANCELLED | OUTPATIENT
Start: 2022-06-29

## 2022-06-29 RX ORDER — ONDANSETRON 2 MG/ML
8 INJECTION INTRAMUSCULAR; INTRAVENOUS ONCE
Status: COMPLETED | OUTPATIENT
Start: 2022-06-29 | End: 2022-06-29

## 2022-06-29 RX ORDER — SODIUM CHLORIDE 9 MG/ML
5-250 INJECTION, SOLUTION INTRAVENOUS PRN
Status: CANCELLED | OUTPATIENT
Start: 2022-06-29

## 2022-06-29 RX ORDER — DIPHENHYDRAMINE HYDROCHLORIDE 50 MG/ML
50 INJECTION INTRAMUSCULAR; INTRAVENOUS
Status: CANCELLED | OUTPATIENT
Start: 2022-06-29

## 2022-06-29 RX ORDER — SODIUM CHLORIDE 0.9 % (FLUSH) 0.9 %
5-40 SYRINGE (ML) INJECTION PRN
Status: DISCONTINUED | OUTPATIENT
Start: 2022-06-29 | End: 2022-06-30 | Stop reason: HOSPADM

## 2022-06-29 RX ORDER — ACETAMINOPHEN 325 MG/1
650 TABLET ORAL
Status: CANCELLED | OUTPATIENT
Start: 2022-06-29

## 2022-06-29 RX ORDER — ONDANSETRON 2 MG/ML
8 INJECTION INTRAMUSCULAR; INTRAVENOUS
Status: CANCELLED | OUTPATIENT
Start: 2022-06-29

## 2022-06-29 RX ORDER — ALBUTEROL SULFATE 90 UG/1
4 AEROSOL, METERED RESPIRATORY (INHALATION) PRN
Status: CANCELLED | OUTPATIENT
Start: 2022-06-29

## 2022-06-29 RX ORDER — FAMOTIDINE 10 MG/ML
20 INJECTION, SOLUTION INTRAVENOUS
Status: CANCELLED | OUTPATIENT
Start: 2022-06-29

## 2022-06-29 RX ORDER — DIPHENHYDRAMINE HYDROCHLORIDE 50 MG/ML
50 INJECTION INTRAMUSCULAR; INTRAVENOUS ONCE
Status: CANCELLED | OUTPATIENT
Start: 2022-06-29 | End: 2022-06-29

## 2022-06-29 RX ADMIN — CARBOPLATIN 275 MG: 10 INJECTION, SOLUTION INTRAVENOUS at 12:30

## 2022-06-29 RX ADMIN — SODIUM CHLORIDE, PRESERVATIVE FREE 10 ML: 5 INJECTION INTRAVENOUS at 11:30

## 2022-06-29 RX ADMIN — DIPHENHYDRAMINE HYDROCHLORIDE 50 MG: 50 INJECTION, SOLUTION INTRAMUSCULAR; INTRAVENOUS at 11:39

## 2022-06-29 RX ADMIN — ONDANSETRON 8 MG: 2 INJECTION INTRAMUSCULAR; INTRAVENOUS at 11:38

## 2022-06-29 RX ADMIN — DEXAMETHASONE SODIUM PHOSPHATE 12 MG: 4 INJECTION, SOLUTION INTRAMUSCULAR; INTRAVENOUS at 11:45

## 2022-06-29 RX ADMIN — FOSAPREPITANT DIMEGLUMINE 150 MG: 150 INJECTION, POWDER, LYOPHILIZED, FOR SOLUTION INTRAVENOUS at 12:05

## 2022-06-29 RX ADMIN — SODIUM CHLORIDE 25 ML/HR: 9 INJECTION, SOLUTION INTRAVENOUS at 11:33

## 2022-06-29 RX ADMIN — FAMOTIDINE 20 MG: 10 INJECTION, SOLUTION INTRAVENOUS at 11:36

## 2022-06-29 ASSESSMENT — PATIENT HEALTH QUESTIONNAIRE - PHQ9
SUM OF ALL RESPONSES TO PHQ QUESTIONS 1-9: 0
SUM OF ALL RESPONSES TO PHQ QUESTIONS 1-9: 0
2. FEELING DOWN, DEPRESSED OR HOPELESS: 0
SUM OF ALL RESPONSES TO PHQ QUESTIONS 1-9: 0
SUM OF ALL RESPONSES TO PHQ QUESTIONS 1-9: 0

## 2022-06-29 NOTE — PATIENT INSTRUCTIONS
Patient Instructions from Today's Visit    Reason for Visit:  Pretreatment    Diagnosis Information:  [email or web address]      Plan:  Treatment as planned chemistries pending    Follow Up:  3 weeks    Recent Lab Results:  Hospital Outpatient Visit on 06/29/2022   Component Date Value Ref Range Status    WBC 06/29/2022 5.8  4.3 - 11.1 K/uL Final    RBC 06/29/2022 3.76* 4.05 - 5.2 M/uL Final    Hemoglobin 06/29/2022 11.3* 11.7 - 15.4 g/dL Final    Hematocrit 06/29/2022 35.8  35.8 - 46.3 % Final    MCV 06/29/2022 95.2  79.6 - 97.8 FL Final    MCH 06/29/2022 30.1  26.1 - 32.9 PG Final    MCHC 06/29/2022 31.6  31.4 - 35.0 g/dL Final    RDW 06/29/2022 16.3* 11.9 - 14.6 % Final    Platelets 13/26/0718 201  150 - 450 K/uL Final    MPV 06/29/2022 8.5* 9.4 - 12.3 FL Final    nRBC 06/29/2022 0.00  0.0 - 0.2 K/uL Final    **Note: Absolute NRBC parameter is now reported with Hemogram**    Differential Type 06/29/2022 AUTOMATED    Final    Seg Neutrophils 06/29/2022 54  43 - 78 % Final    Lymphocytes 06/29/2022 31  13 - 44 % Final    Monocytes 06/29/2022 12  4.0 - 12.0 % Final    Eosinophils % 06/29/2022 2  0.5 - 7.8 % Final    Basophils 06/29/2022 0  0.0 - 2.0 % Final    Immature Granulocytes 06/29/2022 1  0.0 - 5.0 % Final    Segs Absolute 06/29/2022 3.1  1.7 - 8.2 K/UL Final    Absolute Lymph # 06/29/2022 1.8  0.5 - 4.6 K/UL Final    Absolute Mono # 06/29/2022 0.7  0.1 - 1.3 K/UL Final    Absolute Eos # 06/29/2022 0.1  0.0 - 0.8 K/UL Final    Basophils Absolute 06/29/2022 0.0  0.0 - 0.2 K/UL Final    Absolute Immature Granulocyte 06/29/2022 0.1  0.0 - 0.5 K/UL Final       Treatment Summary has been discussed and given to patient: n/a        -------------------------------------------------------------------------------------------------------------------     Patient does express an interest in My Chart.   My Chart log in

## 2022-06-29 NOTE — PROGRESS NOTES
Arrived to the [facility]. Carboplatin completed. Patient tolerated well. Any issues or concerns during appointment: none. Patient aware of next infusion appointment on 7/20/20 (date) at 56 (time). Patient aware of next lab and [facility] office visit on 7/20/20 (date) at [phone] (time). Patient instructed to call provider with temperature of 100.4 or greater or nausea/vomiting/ diarrhea or pain not controlled by medications  Discharged via wheelchair accompanied by son.

## 2022-07-15 DIAGNOSIS — C56.9 MALIGNANT NEOPLASM OF OVARY, UNSPECIFIED LATERALITY (HCC): Primary | ICD-10-CM

## 2022-07-15 DIAGNOSIS — R97.1 ELEVATED CANCER ANTIGEN 125 (CA 125): ICD-10-CM

## 2022-07-19 ASSESSMENT — ENCOUNTER SYMPTOMS
EYES NEGATIVE: 1
ALLERGIC/IMMUNOLOGIC NEGATIVE: 1
GASTROINTESTINAL NEGATIVE: 1
RESPIRATORY NEGATIVE: 1

## 2022-07-19 NOTE — PROGRESS NOTES
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 ?, imgiang march 2022    Only symptom at time rectal bleeding, resolved. History of Present Illness   Roni Freeman is a 82 y. o. 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 Gemma Vargas, on nocturnal home  O2), NIDDM, htn, OA, right ankle surgery after fracture, hospitalization 2/8/22 for Covid-19 infection, UTI, dehydration. More recently seen by them and underwent PET scan after a more recent CT abd (reportedly done for abdominal discomfort and blood per rectum) reportedly had shown abdominal mass. PET scan revealed hypermetabolic multiple abdominal masses as well as right  cardiophrenic mass concerning for malignancy including lymphoma. A hypermetabolic 4.1 cm fluid and gas containing collection located at rectosigmoid junction and uterine fundus concerning for possible abscess also noted. A mildly hypermetabolic consolidative  appearing opacities in the right greater than left lung most consistent with subacute infection/inflammatory process also noted. Denies any recent fevers or drenching night sweats, appears non-toxic.        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      Ca 125 highly elevated    Past Medical History     Past Medical History:   Diagnosis Date    COPD (chronic obstructive pulmonary disease) ([facility] [address]     Depression     Diabetes ([facility] [address]     Hypertension         Past Surgical History Past Surgical History:   Procedure Laterality Date    ANKLE FRACTURE SURGERY      ankle broken    IR PORT PLACEMENT EQUAL OR GREATER THAN 5 YEARS  5/11/2022    IR PORT PLACEMENT EQUAL OR GREATER THAN 5 YEARS  5/11/2022    IR PORT PLACEMENT EQUAL OR GREATER THAN 5 YEARS 5/11/2022 [facility] RADIOLOGY SPECIALS    US ABDOMINAL MASS BIOPSY PERCUTANEOUS  4/28/2022    US ABDOMINAL MASS BIOPSY PERCUTANEOUS 4/28/2022 [facility] RADIOLOGY US        Medications      Current Outpatient Medications:     prochlorperazine (COMPAZINE) 5 MG tablet, TAKE 1 TABLET BY MOUTH EVERY 6 HOURS AS NEEDED FOR NAUSEA OR VOMITING, Disp: , Rfl:     ondansetron (ZOFRAN-ODT) 4 MG disintegrating tablet, , Disp: , Rfl:     traMADol (ULTRAM) 50 MG tablet, , Disp: , Rfl:     lidocaine-prilocaine (EMLA) 2.5-2.5 % cream, Apply a dime size topically to port as needed 45 minutes prior to your lab or infusion appointment. , Disp: 5 g, Rfl: 1    albuterol sulfate HFA (PROVENTIL;VENTOLIN;PROAIR) 108 (90 Base) MCG/ACT inhaler, INHALE 2 PUFFS BY MOUTH EVERY 6 HOURS AS NEEDED, Disp: , Rfl:     aspirin 81 MG chewable tablet, Take 81 mg by mouth daily, Disp: , Rfl:     calcium carbonate-vitamin D 600-200 MG-UNIT TABS, Take 2 tablets by mouth daily, Disp: , Rfl:     cyanocobalamin 500 MCG tablet, Take 500 mcg by mouth daily, Disp: , Rfl:     glimepiride (AMARYL) 1 MG tablet, Take 1 mg by mouth daily, Disp: , Rfl:     ibuprofen (ADVIL;MOTRIN) 200 MG tablet, Take 3 tablets by mouth as needed, Disp: , Rfl:     lisinopril-hydroCHLOROthiazide (PRINZIDE;ZESTORETIC) 10-12.5 MG per tablet, Take 1 tablet by mouth daily, Disp: , Rfl:     LORazepam (ATIVAN) 0.5 MG tablet, Take 0.5 mg by mouth every 8 hours as needed.  , Disp: , Rfl:     Omega-3 Fatty Acids (FISH OIL) 1000 MG CAPS, Take 1 capsule by mouth daily, Disp: , Rfl:     umeclidinium-vilanterol (ANORO ELLIPTA) 62.5-25 MCG/INH AEPB inhaler, INHALE 1 PUFF BY MOUTH EVERY DAY, Disp: , Rfl:      Allergies   Metformin and Penicillins    Social History     Social History       Tobacco History       Smoking Status  Former Smoker      Smokeless Tobacco Use  Never Used              Alcohol History       Alcohol Use Status  Never              Drug Use       Drug Use Status  Never              Sexual Activity       Sexually Active  Not Asked                    Family History     Family History   Problem Relation Age of Onset    Colon Cancer Sister     Cancer Sister         Bladder    Cancer Brother         bladder       Review of Systems   Review of Systems   Constitutional:  Positive for fatigue. HENT: Negative. Eyes: Negative. Respiratory: Negative. Cardiovascular: Negative. Gastrointestinal: Negative. Endocrine: Negative. Genitourinary: Negative. Musculoskeletal: Negative. Skin: Negative. Allergic/Immunologic: Negative. Neurological: Negative. Hematological: Negative. Psychiatric/Behavioral: Negative. All other systems reviewed and are negative. Physical Exam     ECOG PERFORMANCE STATUS - 2 - Ambulatory and capable of all selfcare but unable to carry out any work activities. Up and about more than 50% of waking hours. Blood pressure 126/77, pulse (!) 105, temperature 98 °F (36.7 °C), temperature source Oral, resp. rate 16, height 5' 2.5\" (1.588 m), weight 182 lb 6.4 oz (82.7 kg), SpO2 95 %. Physical Exam  Vitals and nursing note reviewed. Exam conducted with a chaperone present. Constitutional:       Appearance: Normal appearance. HENT:      Head: Normocephalic and atraumatic. Nose: No congestion. Cardiovascular:      Rate and Rhythm: Normal rate and regular rhythm. Pulses: Normal pulses. Heart sounds: Normal heart sounds. Pulmonary:      Effort: Pulmonary effort is normal.      Breath sounds: Normal breath sounds. Abdominal:      General: Abdomen is flat. Palpations: There is no mass. Musculoskeletal:         General: No swelling.  Normal range of motion. Cervical back: Normal range of motion. Skin:     General: Skin is warm and dry. Neurological:      Mental Status: She is alert. Psychiatric:         Mood and Affect: Mood normal.         Behavior: Behavior normal.         Thought Content:  Thought content normal.         Judgment: Judgment normal.       Labs        Recent Results (from the past 48 hour(s))   CBC with Auto Differential    Collection Time: 07/20/22  9:15 AM   Result Value Ref Range    WBC 6.1 4.3 - 11.1 K/uL    RBC 3.69 (L) 4.05 - 5.2 M/uL    Hemoglobin 11.4 (L) 11.7 - 15.4 g/dL    Hematocrit 35.3 (L) 35.8 - 46.3 %    MCV 95.7 79.6 - 97.8 FL    MCH 30.9 26.1 - 32.9 PG    MCHC 32.3 31.4 - 35.0 g/dL    RDW 16.8 (H) 11.9 - 14.6 %    Platelets 564 684 - 684 K/uL    MPV 8.6 (L) 9.4 - 12.3 FL    nRBC 0.00 0.0 - 0.2 K/uL    Seg Neutrophils 52 43 - 78 %    Lymphocytes 32 13 - 44 %    Monocytes 12 4.0 - 12.0 %    Eosinophils % 2 0.5 - 7.8 %    Basophils 1 0.0 - 2.0 %    Immature Granulocytes 1 0.0 - 5.0 %    Segs Absolute 3.2 1.7 - 8.2 K/UL    Absolute Lymph # 1.9 0.5 - 4.6 K/UL    Absolute Mono # 0.7 0.1 - 1.3 K/UL    Absolute Eos # 0.1 0.0 - 0.8 K/UL    Basophils Absolute 0.0 0.0 - 0.2 K/UL    Absolute Immature Granulocyte 0.0 0.0 - 0.5 K/UL    Differential Type AUTOMATED     Comprehensive Metabolic Panel    Collection Time: 07/20/22  9:15 AM   Result Value Ref Range    Sodium 137 136 - 145 mmol/L    Potassium 4.2 3.5 - 5.1 mmol/L    Chloride 104 98 - 107 mmol/L    CO2 26 21 - 32 mmol/L    Anion Gap 7 7 - 16 mmol/L    Glucose 128 (H) 65 - 100 mg/dL    BUN 37 (H) 8 - 23 MG/DL    CREATININE 1.30 (H) 0.6 - 1.0 MG/DL    GFR African American 50 (L) >60 ml/min/1.73m2    GFR Non- 41 (L) >60 ml/min/1.73m2    Calcium 9.7 8.3 - 10.4 MG/DL    Total Bilirubin 0.3 0.2 - 1.1 MG/DL    ALT 28 12 - 65 U/L    AST 18 15 - 37 U/L    Alk Phosphatase 100 50 - 136 U/L    Total Protein 8.6 (H) 6.3 - 8.2 g/dL    Albumin 3.4 3.2 - 4.6 g/dL Globulin 5.2 (H) 2.3 - 3.5 g/dL    Albumin/Globulin Ratio 0.7 (L) 1.2 - 3.5     Magnesium    Collection Time: 07/20/22  9:15 AM   Result Value Ref Range    Magnesium 2.2 1.8 - 2.4 mg/dL   Urinalysis    Collection Time: 07/20/22  9:15 AM   Result Value Ref Range    Color, UA YELLOW      Appearance CLEAR      Specific Gravity, UA 1.015 1.001 - 1.023      pH, Urine 5.0 5.0 - 9.0      Protein, UA Negative NEG mg/dL    Glucose, UA Negative NEG mg/dL    Ketones, Urine Negative NEG mg/dL    Bilirubin Urine Negative NEG      Blood, Urine Negative NEG      Urobilinogen, Urine 0.2 0.2 - 1.0 EU/dL    Nitrite, Urine Negative NEG      Leukocyte Esterase, Urine Negative NEG            Lab Results   Component Value Date     546 (H) 06/29/2022             Component      Latest Ref Rng & Units 6/29/2022 6/8/2022 5/18/2022          10:09 AM 12:34 PM  1:25 PM   ,C125      1.5 - 35.0 U/mL 546 (H) 2479 (H) 3,987 (H)       Pathology      Name:    Adry Santos   Accession Number:   [id number]   MR #   [id number]   Date Obtained:   4/28/2022              \"ABDOMINAL MASS\":  ADENOCARCINOMA, CONSISTENT WITH MULLERIAN ORIGIN. Sign Out Date: 5/3/2022  Nirav Loaiza MD                           [facility]- ER/PA/[id number] BY IHC                                                                                   Status:  Reviewed By:    Margie Loaiza MD on 5/2/2022                                 Interpretation                       xkoto Flow Cytometric Analysis     Diagnosis:     No flow immunophenotypic evidence of a lymphoproliferative disorder. See full report in [facility] as errors can occur when results are   imbedded into this report. [facility]-IMMUNOHISTOCHEMISTRY                                                                                   Status:  Reviewed By:    Iraida Colmenares.  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 Dr. Maco Stewart concurs. Antibody/Test               Marker For                              Result   Keratin AE1/AE3             Broad spectrum epithelial marker                  Positive   Cytokeratin 7               Lung, breast, upper GE, serous ovary                  Positive   Cytokeratin 20               Colon, mucinous ovary, urothelium                  Negative   CDX2                    Gastrointestinal carcinomas                       Negative   GATA3               Urothelial, breast carcinoma                       Negative   Calretinin                Mesothelioma                                 Negative   ER                    Estrogen receptor                          Positive   Napsin                     Lung adenocarcinoma                           Negative   [place]-8                    Renal cell carcinoma                           Positive   TTF-1                     Lung and thyroid adenocarcinoma                 Negative   WT-1                    Serous carcinoma, mesothelioma                 Positive   Imaging/Diagnostics Last 24 Hours   No results found. Radiology:    CT Result (most recent):  CT CHEST ABDOMEN PELVIS W CONTRAST 06/27/2022    Narrative  CT CHEST, ABDOMEN AND PELVIS WITH INTRAVENOUS CONTRAST DATED 6/27/2022. History: Follow-up mixed mullerian tumor. Comparison: PET scan 3/29/2022    Technique:   Multiple contiguous helical CT images reconstructed at 5 mm were  obtained from the base of the neck to the ischial tuberosities following oral  and 100 cc Isovue-370 without acute complication. All CT scans performed at  this facility use one or all of the following: Automated exposure control,  adjustment of the mA and/or kVp according to patient's size, iterative  reconstruction.     Findings:  CT Chest:  The base of the neck is unremarkable in appearance. No evolving axillary,  mediastinal, or hilar lymphadenopathy is seen. The prior hypermetabolic  epicardial nodule likely representing an epicardial lymph node has decreased in  size now seen on image 44 measuring 9 mm and previously measured 16 mm when  measured using a similar technique. An additional hypermetabolic nodule is seen  in the right lateral chest wall at the ninth intercostal space. Nodular density  at this level has not clearly changed best appreciated on axial image 55. The  thoracic aorta is normal in caliber. Evaluation with lung windows demonstrates no new suspicious pulmonary lesion. Only improving inflammatory appearing changes are seen in the right lower lobe  with only mild residual likely scarring now seen on axial image 28. No pleural  effusion is seen. Lungs are expanded without evidence for pneumothorax. No  evolving aggressive osseous lesion is seen. CT ABDOMEN:  The Liver is homogeneous in attenuation. The spleen is homogeneous in  attenuation. No contour deforming or enhancing mass lesions are seen of the  pancreas or adrenal glands. The gallbladder infiltrates multiple faint stones  without acute inflammatory changes. The kidneys enhance symmetrically and no  evidence of hydronephrosis is seen. The visualized loops of small bowel and colon are normal in caliber. No free  fluid and no free air is seen in the abdomen. No evolving adenopathy is seen of  the abdomen. Rather, some improvement is seen in all prior hypermetabolic  mesenteric masses when remeasured using similar technique.  The prior 3.2 cm left  periaortic soft tissue mass is now seen on image 60 measuring 2.3 cm, the prior  3.7 cm mass adjacent to the pancreatic head is now seen on axial image 16  measuring 1.9 cm, the prior 2.6 cm left para-aortic mass is now seen on axial  image 60 measuring 2.3 cm, the prior 3.5 cm left anterior peritoneal mass is now  seen on axial image 72 measuring 3.1 cm, the prior 2.8 cm central left abdominal  mesenteric mass is now seen on axial image 73 measuring 1.7 cm, and the prior  8.9 cm x 5.1 cm central right lower quadrant mass is now seen on axial image 88  measuring 5.5 cm x 4.3 cm. The abdominal aorta demonstrates advanced  atherosclerotic calcification. No evolving aggressive osseous lesion is seen. CT PELVIS:  No abnormal pelvic fluid collections are present. No evolving pelvic adenopathy  is seen. Improving hypermetabolic masses are seen in the right pelvis. The prior  6.4 cm x 5 cm mass now measures 3.9 cm x 2.3 cm. Hypermetabolic tumor extends to  the uterus. Prior abnormal gas within the endometrium persist although this does  appear improved. The urinary bladder is unremarkable. No evolving aggressive  osseous lesion is seen. Impression  1. Improving chest soft tissue, mesenteric and pelvic masses as described  above. Only one small right lateral chest wall soft tissue nodule shows no  significant interval change. In addition, improving pulmonary parenchymal  changes are seen in the right lower lobe although the prior appearance favors a  benign inflammatory process. No findings concerning for disease progression are  seen at any level. This report was made using voice transcription. Despite my best efforts to avoid  any, transcription errors may persist. If there is any question about the  accuracy of the report or need for clarification, then please call [phone], or text me through perfectserv for clarification or correction. PET Results (most recent):  PET CT SKULL BASE TO MID THIGH 03/29/2022    Narrative  PET/CT    Indication: Abdominal mass on outside CT    Radiopharmaceutical: 14.4 mCi F18-FDG, intravenously. Technique: Imaging was performed from the skull through the proximal thighs  using routine PET/CT acquisition protocol. Imaging was performed approximately  60 minutes post injection.  Oral contrast was administered. Radiation dose  reduction techniques were used for this study:  Our CT scanners use one or all  of the following: Automated exposure control, adjustment of the mA and/or kVp  according to patient's size, iterative reconstruction. Serum glucose: 93 mg/dL prior to injection. Comparison studies: None available. Findings:    Head and Neck: No enlarged or hypermetabolic lymph nodes within the neck. Chest: A lymph node in the right cardiophrenic fat is hypermetabolic, measuring  1.6 cm in maximum SUV of 10.9. Hypermetabolic nodular and slightly consolidative appearing opacities in the  posterior right upper and lower lobes and to a lesser extent the left lower  lobe. Abdomen/Pelvis: Hypermetabolic solid masses throughout the abdomen index as  follows:  Peripancreatic/periportal mass measuring 3.7 cm, maximum SUV 20.3.  3.2 cm mass medially to the gastric fundus, maximum SUV 13.2  Left para-aortic mass measuring 2.6 cm, maximum SUV 15.6. Omental mass measuring 3.5 cm, maximum SUV 13.3. Left mesenteric mass measuring 2.8 cm, maximum SUV 16.7. Right lower quadrant mass measuring 8.9 x 5.1 cm, maximum SUV 19.5. Right pelvic sidewall mass measuring 6.4 x 5.0 cm, axillary SUV 26. Hypermetabolic gas and fluid containing collection measuring 4.1 cm adjacent to  the sigmoid colon contacting the uterine fundus. Impression  1. Hypermetabolic right cardiophrenic and abdominal masses as above concerning  for malignancy such as lymphoma. 2.  Hypermetabolic 4.1 cm fluid and gas-containing collection located between  the rectosigmoid junction and uterine fundus concerning for a possible abscess  or pyomyoma. Recommend correlation for any evidence of infection. 3.  Mildly hypermetabolic nodular and slightly consolidative appearing opacities  in the right greater than left lungs appearance most consistent with a subacute  infectious/inflammatory process.       The study was referred to the imaging navigator to expedite delivery of results. 12    Mammo Results (most recent):  No results found for this or any previous visit from the past 365 days. US Result (most recent):  US ABDOMINAL MASS BIOPSY PERCUTANEOUS 04/28/2022    Narrative  History: Abdominal masses    Consent: Informed written and oral consent was obtained from the patient after  explanation of benefits and risks (including, but not limited to: Infection,  Hemorrhage, and Visceral injury) of procedure. The patient's questions were  answered and requested that we proceed. Procedure:  Sterile barrier technique (including:  cap, mask, sterile gloves,  sterile sheet, hand hygiene, and chlorhexidene for cutaneous antisepsis) were  used. Following routine prep and drape of the upper abdomen, a local field  block with 1% lidocaine was achieved. Using real-time ultrasound guidance, a 17  gauge needle was advanced into the superficial aspect of the left upper quadrant  abdominal mass. Through this base needle, 4 18 gauge core biopsies were  performed. The postprocedure ultrasound demonstrates no evidence of hematoma. Complications: None. Medications:  Under physician supervision, intraservice time of 17 minutes of  moderate intravenous conscious sedation was performed by administering Versed,  Fentanyl, intravenously. Continuous pulse oximetry, heart rate, and blood  pressure monitoring was performed with an independent, trained observer present. Impression  Technically successful ultrasound guided left upper quadrant  abdominal mass biopsy. The gas and fluid filled focus in the pelvis is not  amenable to percutaneous drainage or biopsy. Specimen: Sent to cytopathology. Plan: The patient will recover for approximately 1 hour         Assessment       Diagnosis Orders   1.  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 [place] [address]  Office : [phone]  Fax : [phone]

## 2022-07-20 ENCOUNTER — HOSPITAL ENCOUNTER (OUTPATIENT)
Dept: INFUSION THERAPY | Age: 87
Discharge: HOME | End: 2022-07-20
Payer: MEDICARE

## 2022-07-20 ENCOUNTER — OFFICE VISIT (OUTPATIENT)
Dept: ONCOLOGY | Age: 87
End: 2022-07-20
Payer: MEDICARE

## 2022-07-20 VITALS
WEIGHT: 182.4 LBS | RESPIRATION RATE: 16 BRPM | HEIGHT: 63 IN | OXYGEN SATURATION: 95 % | TEMPERATURE: 98 F | SYSTOLIC BLOOD PRESSURE: 126 MMHG | HEART RATE: 105 BPM | BODY MASS INDEX: 32.32 KG/M2 | DIASTOLIC BLOOD PRESSURE: 77 MMHG

## 2022-07-20 DIAGNOSIS — Z51.11 ENCOUNTER FOR CHEMOTHERAPY MANAGEMENT: ICD-10-CM

## 2022-07-20 DIAGNOSIS — R97.1 ELEVATED CANCER ANTIGEN 125 (CA 125): ICD-10-CM

## 2022-07-20 DIAGNOSIS — C54.9 MIXED MULLERIAN TUMOR (HCC): Primary | ICD-10-CM

## 2022-07-20 DIAGNOSIS — C56.9 OVARY CANCER, UNSPECIFIED LATERALITY (HCC): Primary | ICD-10-CM

## 2022-07-20 DIAGNOSIS — C56.9 MALIGNANT NEOPLASM OF OVARY, UNSPECIFIED LATERALITY (HCC): ICD-10-CM

## 2022-07-20 LAB
ALBUMIN SERPL-MCNC: 3.4 G/DL (ref 3.2–4.6)
ALBUMIN/GLOB SERPL: 0.7 {RATIO} (ref 1.2–3.5)
ALP SERPL-CCNC: 100 U/L (ref 50–136)
ALT SERPL-CCNC: 28 U/L (ref 12–65)
ANION GAP SERPL CALC-SCNC: 7 MMOL/L (ref 7–16)
APPEARANCE UR: CLEAR
AST SERPL-CCNC: 18 U/L (ref 15–37)
BASOPHILS # BLD: 0 K/UL (ref 0–0.2)
BASOPHILS NFR BLD: 1 % (ref 0–2)
BILIRUB SERPL-MCNC: 0.3 MG/DL (ref 0.2–1.1)
BILIRUB UR QL: NEGATIVE
BUN SERPL-MCNC: 37 MG/DL (ref 8–23)
CALCIUM SERPL-MCNC: 9.7 MG/DL (ref 8.3–10.4)
CANCER AG125 SERPL-ACNC: 163 U/ML (ref 1.5–35)
CHLORIDE SERPL-SCNC: 104 MMOL/L (ref 98–107)
CO2 SERPL-SCNC: 26 MMOL/L (ref 21–32)
COLOR UR: YELLOW
CREAT SERPL-MCNC: 1.3 MG/DL (ref 0.6–1)
DIFFERENTIAL METHOD BLD: ABNORMAL
EOSINOPHIL # BLD: 0.1 K/UL (ref 0–0.8)
EOSINOPHIL NFR BLD: 2 % (ref 0.5–7.8)
ERYTHROCYTE [DISTWIDTH] IN BLOOD BY AUTOMATED COUNT: 16.8 % (ref 11.9–14.6)
GLOBULIN SER CALC-MCNC: 5.2 G/DL (ref 2.3–3.5)
GLUCOSE SERPL-MCNC: 128 MG/DL (ref 65–100)
GLUCOSE UR STRIP.AUTO-MCNC: NEGATIVE MG/DL
HCT VFR BLD AUTO: 35.3 % (ref 35.8–46.3)
HGB BLD-MCNC: 11.4 G/DL (ref 11.7–15.4)
HGB UR QL STRIP: NEGATIVE
IMM GRANULOCYTES # BLD AUTO: 0 K/UL (ref 0–0.5)
IMM GRANULOCYTES NFR BLD AUTO: 1 % (ref 0–5)
KETONES UR QL STRIP.AUTO: NEGATIVE MG/DL
LEUKOCYTE ESTERASE UR QL STRIP.AUTO: NEGATIVE
LYMPHOCYTES # BLD: 1.9 K/UL (ref 0.5–4.6)
LYMPHOCYTES NFR BLD: 32 % (ref 13–44)
MAGNESIUM SERPL-MCNC: 2.2 MG/DL (ref 1.8–2.4)
MCH RBC QN AUTO: 30.9 PG (ref 26.1–32.9)
MCHC RBC AUTO-ENTMCNC: 32.3 G/DL (ref 31.4–35)
MCV RBC AUTO: 95.7 FL (ref 79.6–97.8)
MONOCYTES # BLD: 0.7 K/UL (ref 0.1–1.3)
MONOCYTES NFR BLD: 12 % (ref 4–12)
NEUTS SEG # BLD: 3.2 K/UL (ref 1.7–8.2)
NEUTS SEG NFR BLD: 52 % (ref 43–78)
NITRITE UR QL STRIP.AUTO: NEGATIVE
NRBC # BLD: 0 K/UL (ref 0–0.2)
PH UR STRIP: 5 [PH] (ref 5–9)
PLATELET # BLD AUTO: 196 K/UL (ref 150–450)
PMV BLD AUTO: 8.6 FL (ref 9.4–12.3)
POTASSIUM SERPL-SCNC: 4.2 MMOL/L (ref 3.5–5.1)
PROT SERPL-MCNC: 8.6 G/DL (ref 6.3–8.2)
PROT UR STRIP-MCNC: NEGATIVE MG/DL
RBC # BLD AUTO: 3.69 M/UL (ref 4.05–5.2)
SODIUM SERPL-SCNC: 137 MMOL/L (ref 136–145)
SP GR UR REFRACTOMETRY: 1.01 (ref 1–1.02)
UROBILINOGEN UR QL STRIP.AUTO: 0.2 EU/DL (ref 0.2–1)
WBC # BLD AUTO: 6.1 K/UL (ref 4.3–11.1)

## 2022-07-20 PROCEDURE — 86304 IMMUNOASSAY TUMOR CA 125: CPT

## 2022-07-20 PROCEDURE — 1123F ACP DISCUSS/DSCN MKR DOCD: CPT | Performed by: OBSTETRICS & GYNECOLOGY

## 2022-07-20 PROCEDURE — 2580000003 HC RX 258: Performed by: OBSTETRICS & GYNECOLOGY

## 2022-07-20 PROCEDURE — G8400 PT W/DXA NO RESULTS DOC: HCPCS | Performed by: OBSTETRICS & GYNECOLOGY

## 2022-07-20 PROCEDURE — 96375 TX/PRO/DX INJ NEW DRUG ADDON: CPT

## 2022-07-20 PROCEDURE — G8427 DOCREV CUR MEDS BY ELIG CLIN: HCPCS | Performed by: OBSTETRICS & GYNECOLOGY

## 2022-07-20 PROCEDURE — 81003 URINALYSIS AUTO W/O SCOPE: CPT

## 2022-07-20 PROCEDURE — 96413 CHEMO IV INFUSION 1 HR: CPT

## 2022-07-20 PROCEDURE — 1090F PRES/ABSN URINE INCON ASSESS: CPT | Performed by: OBSTETRICS & GYNECOLOGY

## 2022-07-20 PROCEDURE — 6360000002 HC RX W HCPCS: Performed by: OBSTETRICS & GYNECOLOGY

## 2022-07-20 PROCEDURE — 96367 TX/PROPH/DG ADDL SEQ IV INF: CPT

## 2022-07-20 PROCEDURE — 80053 COMPREHEN METABOLIC PANEL: CPT

## 2022-07-20 PROCEDURE — 99214 OFFICE O/P EST MOD 30 MIN: CPT | Performed by: OBSTETRICS & GYNECOLOGY

## 2022-07-20 PROCEDURE — 85025 COMPLETE CBC W/AUTO DIFF WBC: CPT

## 2022-07-20 PROCEDURE — 1036F TOBACCO NON-USER: CPT | Performed by: OBSTETRICS & GYNECOLOGY

## 2022-07-20 PROCEDURE — G8417 CALC BMI ABV UP PARAM F/U: HCPCS | Performed by: OBSTETRICS & GYNECOLOGY

## 2022-07-20 PROCEDURE — 36591 DRAW BLOOD OFF VENOUS DEVICE: CPT

## 2022-07-20 PROCEDURE — 83735 ASSAY OF MAGNESIUM: CPT

## 2022-07-20 RX ORDER — EPINEPHRINE 1 MG/ML
0.3 INJECTION, SOLUTION, CONCENTRATE INTRAVENOUS PRN
Status: CANCELLED | OUTPATIENT
Start: 2022-07-20

## 2022-07-20 RX ORDER — SODIUM CHLORIDE 0.9 % (FLUSH) 0.9 %
10 SYRINGE (ML) INJECTION PRN
Status: DISCONTINUED | OUTPATIENT
Start: 2022-07-20 | End: 2022-07-21 | Stop reason: HOSPADM

## 2022-07-20 RX ORDER — SODIUM CHLORIDE 9 MG/ML
5-250 INJECTION, SOLUTION INTRAVENOUS PRN
Status: DISCONTINUED | OUTPATIENT
Start: 2022-07-20 | End: 2022-07-21 | Stop reason: HOSPADM

## 2022-07-20 RX ORDER — SODIUM CHLORIDE 0.9 % (FLUSH) 0.9 %
5-40 SYRINGE (ML) INJECTION PRN
Status: CANCELLED | OUTPATIENT
Start: 2022-07-20

## 2022-07-20 RX ORDER — SODIUM CHLORIDE 9 MG/ML
INJECTION, SOLUTION INTRAVENOUS CONTINUOUS
Status: CANCELLED | OUTPATIENT
Start: 2022-07-20

## 2022-07-20 RX ORDER — SODIUM CHLORIDE 0.9 % (FLUSH) 0.9 %
5-40 SYRINGE (ML) INJECTION PRN
Status: DISCONTINUED | OUTPATIENT
Start: 2022-07-20 | End: 2022-07-21 | Stop reason: HOSPADM

## 2022-07-20 RX ORDER — TRAMADOL HYDROCHLORIDE 50 MG/1
50 TABLET ORAL EVERY 8 HOURS PRN
Qty: 50 TABLET | Refills: 0 | Status: SHIPPED | OUTPATIENT
Start: 2022-07-20 | End: 2022-08-19

## 2022-07-20 RX ORDER — ALBUTEROL SULFATE 90 UG/1
4 AEROSOL, METERED RESPIRATORY (INHALATION) PRN
Status: CANCELLED | OUTPATIENT
Start: 2022-07-20

## 2022-07-20 RX ORDER — ONDANSETRON 2 MG/ML
8 INJECTION INTRAMUSCULAR; INTRAVENOUS ONCE
Status: CANCELLED | OUTPATIENT
Start: 2022-07-20 | End: 2022-07-20

## 2022-07-20 RX ORDER — SODIUM CHLORIDE 9 MG/ML
5-40 INJECTION INTRAVENOUS PRN
Status: CANCELLED | OUTPATIENT
Start: 2022-07-20

## 2022-07-20 RX ORDER — ONDANSETRON 2 MG/ML
8 INJECTION INTRAMUSCULAR; INTRAVENOUS
Status: CANCELLED | OUTPATIENT
Start: 2022-07-20

## 2022-07-20 RX ORDER — ACETAMINOPHEN 325 MG/1
650 TABLET ORAL
Status: CANCELLED | OUTPATIENT
Start: 2022-07-20

## 2022-07-20 RX ORDER — MEPERIDINE HYDROCHLORIDE 50 MG/ML
12.5 INJECTION INTRAMUSCULAR; INTRAVENOUS; SUBCUTANEOUS PRN
Status: CANCELLED | OUTPATIENT
Start: 2022-07-20

## 2022-07-20 RX ORDER — DIPHENHYDRAMINE HYDROCHLORIDE 50 MG/ML
50 INJECTION INTRAMUSCULAR; INTRAVENOUS
Status: CANCELLED | OUTPATIENT
Start: 2022-07-20

## 2022-07-20 RX ORDER — SODIUM CHLORIDE 9 MG/ML
5-250 INJECTION, SOLUTION INTRAVENOUS PRN
Status: CANCELLED | OUTPATIENT
Start: 2022-07-20

## 2022-07-20 RX ORDER — HEPARIN SODIUM (PORCINE) LOCK FLUSH IV SOLN 100 UNIT/ML 100 UNIT/ML
500 SOLUTION INTRAVENOUS PRN
Status: CANCELLED | OUTPATIENT
Start: 2022-07-20

## 2022-07-20 RX ORDER — ONDANSETRON 2 MG/ML
8 INJECTION INTRAMUSCULAR; INTRAVENOUS ONCE
Status: COMPLETED | OUTPATIENT
Start: 2022-07-20 | End: 2022-07-20

## 2022-07-20 RX ORDER — FAMOTIDINE 10 MG/ML
20 INJECTION, SOLUTION INTRAVENOUS
Status: CANCELLED | OUTPATIENT
Start: 2022-07-20

## 2022-07-20 RX ADMIN — CARBOPLATIN 287 MG: 10 INJECTION, SOLUTION INTRAVENOUS at 11:45

## 2022-07-20 RX ADMIN — SODIUM CHLORIDE 25 ML/HR: 9 INJECTION, SOLUTION INTRAVENOUS at 10:42

## 2022-07-20 RX ADMIN — FOSAPREPITANT 150 MG: 150 INJECTION, POWDER, LYOPHILIZED, FOR SOLUTION INTRAVENOUS at 11:16

## 2022-07-20 RX ADMIN — SODIUM CHLORIDE, PRESERVATIVE FREE 10 ML: 5 INJECTION INTRAVENOUS at 10:41

## 2022-07-20 RX ADMIN — ONDANSETRON 8 MG: 2 INJECTION INTRAMUSCULAR; INTRAVENOUS at 10:52

## 2022-07-20 RX ADMIN — Medication 10 ML: at 09:25

## 2022-07-20 RX ADMIN — DEXAMETHASONE SODIUM PHOSPHATE 12 MG: 4 INJECTION, SOLUTION INTRAMUSCULAR; INTRAVENOUS at 10:53

## 2022-07-20 ASSESSMENT — PATIENT HEALTH QUESTIONNAIRE - PHQ9
2. FEELING DOWN, DEPRESSED OR HOPELESS: 0
1. LITTLE INTEREST OR PLEASURE IN DOING THINGS: 0
SUM OF ALL RESPONSES TO PHQ QUESTIONS 1-9: 0
SUM OF ALL RESPONSES TO PHQ9 QUESTIONS 1 & 2: 0
SUM OF ALL RESPONSES TO PHQ QUESTIONS 1-9: 0

## 2022-07-20 NOTE — PROGRESS NOTES
Patient arrived to port lab for port access and lab draw   Olivia Patterson 45 accessed and labs drawn per protocol   *Port remains accessed   Patient discharged from port lab via w/c*

## 2022-07-20 NOTE — PROGRESS NOTES
Patient arrived ambulatory to infusion area. C4D1 Carboplatin completed. Patient tolerated well. Port deaccessed. Discharged ambulatory. Patient aware of next infusion on 8/10.

## 2022-07-20 NOTE — PATIENT INSTRUCTIONS
Patient Instructions from Today's Visit    Reason for Visit:  Pre Treatment    Diagnosis Information:  [email or web address]      Plan:  Carboplatin alone today    Follow Up:  3 weeks    Recent Lab Results:  Hospital Outpatient Visit on 07/20/2022   Component Date Value Ref Range Status    Color, UA 07/20/2022 YELLOW    Final    Appearance 07/20/2022 CLEAR    Final    Specific Gravity, UA 07/20/2022 1.015  1.001 - 1.023   Final    pH, Urine 07/20/2022 5.0  5.0 - 9.0   Final    Protein, UA 07/20/2022 Negative  NEG mg/dL Final    Glucose, UA 07/20/2022 Negative  NEG mg/dL Final    Ketones, Urine 07/20/2022 Negative  NEG mg/dL Final    Bilirubin Urine 07/20/2022 Negative  NEG   Final    Blood, Urine 07/20/2022 Negative  NEG   Final    Urobilinogen, Urine 07/20/2022 0.2  0.2 - 1.0 EU/dL Final    Nitrite, Urine 07/20/2022 Negative  NEG   Final    Leukocyte Esterase, Urine 07/20/2022 Negative  NEG   Final        Treatment Summary has been discussed and given to patient: n/a        -------------------------------------------------------------------------------------------------------------------    Patient does express an interest in My Chart. My Chart log in information explained on the after visit summary printout at the [facility] As[facility] Rene PAK desk.     True Sutton, RN, MSN, OCN  Gynecology Nurse Navigator for Dr. Debbie Wesley  [address]  Phone:  [phone]  Fax: [phone]  Email: [email or web address]

## 2022-08-10 ENCOUNTER — OFFICE VISIT (OUTPATIENT)
Dept: ONCOLOGY | Age: 87
End: 2022-08-10
Payer: MEDICARE

## 2022-08-10 ENCOUNTER — HOSPITAL ENCOUNTER (OUTPATIENT)
Dept: INFUSION THERAPY | Age: 87
Discharge: HOME | End: 2022-08-10
Payer: MEDICARE

## 2022-08-10 VITALS — HEIGHT: 63 IN | BODY MASS INDEX: 33.13 KG/M2 | WEIGHT: 187 LBS

## 2022-08-10 VITALS
HEART RATE: 95 BPM | OXYGEN SATURATION: 95 % | SYSTOLIC BLOOD PRESSURE: 126 MMHG | RESPIRATION RATE: 18 BRPM | TEMPERATURE: 98.3 F | DIASTOLIC BLOOD PRESSURE: 60 MMHG

## 2022-08-10 DIAGNOSIS — C56.9 MALIGNANT NEOPLASM OF OVARY, UNSPECIFIED LATERALITY (HCC): ICD-10-CM

## 2022-08-10 DIAGNOSIS — C54.9 MIXED MULLERIAN TUMOR (HCC): Primary | ICD-10-CM

## 2022-08-10 DIAGNOSIS — R97.1 ELEVATED CANCER ANTIGEN 125 (CA 125): ICD-10-CM

## 2022-08-10 DIAGNOSIS — C56.9 MALIGNANT NEOPLASM OF OVARY, UNSPECIFIED LATERALITY (HCC): Primary | ICD-10-CM

## 2022-08-10 LAB
ALBUMIN SERPL-MCNC: 3.2 G/DL (ref 3.2–4.6)
ALBUMIN/GLOB SERPL: 0.7 {RATIO} (ref 1.2–3.5)
ALP SERPL-CCNC: 113 U/L (ref 50–136)
ALT SERPL-CCNC: 23 U/L (ref 12–65)
ANION GAP SERPL CALC-SCNC: 8 MMOL/L (ref 7–16)
AST SERPL-CCNC: 14 U/L (ref 15–37)
BASOPHILS # BLD: 0 K/UL (ref 0–0.2)
BASOPHILS NFR BLD: 1 % (ref 0–2)
BILIRUB SERPL-MCNC: 0.4 MG/DL (ref 0.2–1.1)
BUN SERPL-MCNC: 28 MG/DL (ref 8–23)
CALCIUM SERPL-MCNC: 9.1 MG/DL (ref 8.3–10.4)
CANCER AG125 SERPL-ACNC: 56 U/ML (ref 1.5–35)
CHLORIDE SERPL-SCNC: 103 MMOL/L (ref 98–107)
CO2 SERPL-SCNC: 25 MMOL/L (ref 21–32)
CREAT SERPL-MCNC: 1.4 MG/DL (ref 0.6–1)
DIFFERENTIAL METHOD BLD: ABNORMAL
EOSINOPHIL # BLD: 0.1 K/UL (ref 0–0.8)
EOSINOPHIL NFR BLD: 2 % (ref 0.5–7.8)
ERYTHROCYTE [DISTWIDTH] IN BLOOD BY AUTOMATED COUNT: 16.6 % (ref 11.9–14.6)
GLOBULIN SER CALC-MCNC: 4.8 G/DL (ref 2.3–3.5)
GLUCOSE SERPL-MCNC: 129 MG/DL (ref 65–100)
HCT VFR BLD AUTO: 33.2 % (ref 35.8–46.3)
HGB BLD-MCNC: 10.7 G/DL (ref 11.7–15.4)
IMM GRANULOCYTES # BLD AUTO: 0.1 K/UL (ref 0–0.5)
IMM GRANULOCYTES NFR BLD AUTO: 1 % (ref 0–5)
LYMPHOCYTES # BLD: 1.6 K/UL (ref 0.5–4.6)
LYMPHOCYTES NFR BLD: 28 % (ref 13–44)
MAGNESIUM SERPL-MCNC: 2.2 MG/DL (ref 1.8–2.4)
MCH RBC QN AUTO: 31.8 PG (ref 26.1–32.9)
MCHC RBC AUTO-ENTMCNC: 32.2 G/DL (ref 31.4–35)
MCV RBC AUTO: 98.5 FL (ref 79.6–97.8)
MONOCYTES # BLD: 0.6 K/UL (ref 0.1–1.3)
MONOCYTES NFR BLD: 11 % (ref 4–12)
NEUTS SEG # BLD: 3.2 K/UL (ref 1.7–8.2)
NEUTS SEG NFR BLD: 58 % (ref 43–78)
NRBC # BLD: 0 K/UL (ref 0–0.2)
PLATELET # BLD AUTO: 184 K/UL (ref 150–450)
PMV BLD AUTO: 8.7 FL (ref 9.4–12.3)
POTASSIUM SERPL-SCNC: 4.4 MMOL/L (ref 3.5–5.1)
PROT SERPL-MCNC: 8 G/DL (ref 6.3–8.2)
RBC # BLD AUTO: 3.37 M/UL (ref 4.05–5.2)
SODIUM SERPL-SCNC: 136 MMOL/L (ref 136–145)
WBC # BLD AUTO: 5.6 K/UL (ref 4.3–11.1)

## 2022-08-10 PROCEDURE — 6360000002 HC RX W HCPCS: Performed by: NURSE PRACTITIONER

## 2022-08-10 PROCEDURE — 80053 COMPREHEN METABOLIC PANEL: CPT

## 2022-08-10 PROCEDURE — 83735 ASSAY OF MAGNESIUM: CPT

## 2022-08-10 PROCEDURE — 2580000003 HC RX 258: Performed by: NURSE PRACTITIONER

## 2022-08-10 PROCEDURE — 85025 COMPLETE CBC W/AUTO DIFF WBC: CPT

## 2022-08-10 PROCEDURE — 96413 CHEMO IV INFUSION 1 HR: CPT

## 2022-08-10 PROCEDURE — 96375 TX/PRO/DX INJ NEW DRUG ADDON: CPT

## 2022-08-10 PROCEDURE — 96367 TX/PROPH/DG ADDL SEQ IV INF: CPT

## 2022-08-10 PROCEDURE — 2580000003 HC RX 258: Performed by: OBSTETRICS & GYNECOLOGY

## 2022-08-10 PROCEDURE — 36591 DRAW BLOOD OFF VENOUS DEVICE: CPT

## 2022-08-10 PROCEDURE — 1123F ACP DISCUSS/DSCN MKR DOCD: CPT | Performed by: NURSE PRACTITIONER

## 2022-08-10 PROCEDURE — 99214 OFFICE O/P EST MOD 30 MIN: CPT | Performed by: NURSE PRACTITIONER

## 2022-08-10 PROCEDURE — 86304 IMMUNOASSAY TUMOR CA 125: CPT

## 2022-08-10 RX ORDER — ALBUTEROL SULFATE 90 UG/1
4 AEROSOL, METERED RESPIRATORY (INHALATION) PRN
Status: DISCONTINUED | OUTPATIENT
Start: 2022-08-10 | End: 2022-08-11 | Stop reason: HOSPADM

## 2022-08-10 RX ORDER — SODIUM CHLORIDE 9 MG/ML
5-250 INJECTION, SOLUTION INTRAVENOUS PRN
Status: DISCONTINUED | OUTPATIENT
Start: 2022-08-10 | End: 2022-08-11 | Stop reason: HOSPADM

## 2022-08-10 RX ORDER — SODIUM CHLORIDE 0.9 % (FLUSH) 0.9 %
5-40 SYRINGE (ML) INJECTION PRN
Status: CANCELLED | OUTPATIENT
Start: 2022-08-10

## 2022-08-10 RX ORDER — SODIUM CHLORIDE 9 MG/ML
5-40 INJECTION INTRAVENOUS PRN
Status: CANCELLED | OUTPATIENT
Start: 2022-08-10

## 2022-08-10 RX ORDER — DIPHENHYDRAMINE HYDROCHLORIDE 50 MG/ML
50 INJECTION INTRAMUSCULAR; INTRAVENOUS
Status: ACTIVE | OUTPATIENT
Start: 2022-08-10 | End: 2022-08-10

## 2022-08-10 RX ORDER — ALBUTEROL SULFATE 90 UG/1
4 AEROSOL, METERED RESPIRATORY (INHALATION) PRN
Status: CANCELLED | OUTPATIENT
Start: 2022-08-10

## 2022-08-10 RX ORDER — SODIUM CHLORIDE 9 MG/ML
INJECTION, SOLUTION INTRAVENOUS CONTINUOUS
Status: DISCONTINUED | OUTPATIENT
Start: 2022-08-10 | End: 2022-08-11 | Stop reason: HOSPADM

## 2022-08-10 RX ORDER — SODIUM CHLORIDE 9 MG/ML
5-250 INJECTION, SOLUTION INTRAVENOUS PRN
Status: CANCELLED | OUTPATIENT
Start: 2022-08-10

## 2022-08-10 RX ORDER — MEPERIDINE HYDROCHLORIDE 25 MG/ML
12.5 INJECTION INTRAMUSCULAR; INTRAVENOUS; SUBCUTANEOUS PRN
Status: DISCONTINUED | OUTPATIENT
Start: 2022-08-10 | End: 2022-08-11 | Stop reason: HOSPADM

## 2022-08-10 RX ORDER — MEPERIDINE HYDROCHLORIDE 50 MG/ML
12.5 INJECTION INTRAMUSCULAR; INTRAVENOUS; SUBCUTANEOUS PRN
Status: CANCELLED | OUTPATIENT
Start: 2022-08-10

## 2022-08-10 RX ORDER — ONDANSETRON 2 MG/ML
8 INJECTION INTRAMUSCULAR; INTRAVENOUS ONCE
Status: COMPLETED | OUTPATIENT
Start: 2022-08-10 | End: 2022-08-10

## 2022-08-10 RX ORDER — ACETAMINOPHEN 325 MG/1
650 TABLET ORAL
Status: ACTIVE | OUTPATIENT
Start: 2022-08-10 | End: 2022-08-10

## 2022-08-10 RX ORDER — ACETAMINOPHEN 325 MG/1
650 TABLET ORAL
Status: CANCELLED | OUTPATIENT
Start: 2022-08-10

## 2022-08-10 RX ORDER — ONDANSETRON 2 MG/ML
8 INJECTION INTRAMUSCULAR; INTRAVENOUS
Status: ACTIVE | OUTPATIENT
Start: 2022-08-10 | End: 2022-08-10

## 2022-08-10 RX ORDER — SODIUM CHLORIDE 0.9 % (FLUSH) 0.9 %
5-40 SYRINGE (ML) INJECTION PRN
Status: DISCONTINUED | OUTPATIENT
Start: 2022-08-10 | End: 2022-08-11 | Stop reason: HOSPADM

## 2022-08-10 RX ORDER — DIPHENHYDRAMINE HYDROCHLORIDE 50 MG/ML
50 INJECTION INTRAMUSCULAR; INTRAVENOUS
Status: CANCELLED | OUTPATIENT
Start: 2022-08-10

## 2022-08-10 RX ORDER — HEPARIN SODIUM (PORCINE) LOCK FLUSH IV SOLN 100 UNIT/ML 100 UNIT/ML
500 SOLUTION INTRAVENOUS PRN
Status: CANCELLED | OUTPATIENT
Start: 2022-08-10

## 2022-08-10 RX ORDER — SODIUM CHLORIDE 0.9 % (FLUSH) 0.9 %
10 SYRINGE (ML) INJECTION PRN
Status: DISCONTINUED | OUTPATIENT
Start: 2022-08-10 | End: 2022-08-11 | Stop reason: HOSPADM

## 2022-08-10 RX ORDER — EPINEPHRINE 1 MG/ML
0.3 INJECTION, SOLUTION, CONCENTRATE INTRAVENOUS PRN
Status: CANCELLED | OUTPATIENT
Start: 2022-08-10

## 2022-08-10 RX ORDER — SODIUM CHLORIDE 9 MG/ML
INJECTION, SOLUTION INTRAVENOUS CONTINUOUS
Status: CANCELLED | OUTPATIENT
Start: 2022-08-10

## 2022-08-10 RX ORDER — ONDANSETRON 2 MG/ML
8 INJECTION INTRAMUSCULAR; INTRAVENOUS
Status: CANCELLED | OUTPATIENT
Start: 2022-08-10

## 2022-08-10 RX ORDER — EPINEPHRINE 1 MG/ML
0.3 INJECTION, SOLUTION, CONCENTRATE INTRAVENOUS PRN
Status: DISCONTINUED | OUTPATIENT
Start: 2022-08-10 | End: 2022-08-11 | Stop reason: HOSPADM

## 2022-08-10 RX ORDER — FAMOTIDINE 10 MG/ML
20 INJECTION, SOLUTION INTRAVENOUS
Status: CANCELLED | OUTPATIENT
Start: 2022-08-10

## 2022-08-10 RX ORDER — ONDANSETRON 2 MG/ML
8 INJECTION INTRAMUSCULAR; INTRAVENOUS ONCE
Status: CANCELLED | OUTPATIENT
Start: 2022-08-10 | End: 2022-08-10

## 2022-08-10 RX ADMIN — SODIUM CHLORIDE, PRESERVATIVE FREE 10 ML: 5 INJECTION INTRAVENOUS at 12:59

## 2022-08-10 RX ADMIN — FOSAPREPITANT 150 MG: 150 INJECTION, POWDER, LYOPHILIZED, FOR SOLUTION INTRAVENOUS at 11:49

## 2022-08-10 RX ADMIN — CARBOPLATIN 275 MG: 10 INJECTION, SOLUTION INTRAVENOUS at 12:23

## 2022-08-10 RX ADMIN — SODIUM CHLORIDE 50 ML/HR: 9 INJECTION, SOLUTION INTRAVENOUS at 11:22

## 2022-08-10 RX ADMIN — DEXAMETHASONE SODIUM PHOSPHATE 12 MG: 4 INJECTION, SOLUTION INTRAMUSCULAR; INTRAVENOUS at 11:24

## 2022-08-10 RX ADMIN — Medication 10 ML: at 09:39

## 2022-08-10 RX ADMIN — ONDANSETRON 8 MG: 2 INJECTION INTRAMUSCULAR; INTRAVENOUS at 11:23

## 2022-08-10 NOTE — PROGRESS NOTES
Pt arrived via w/c. Premeds and Carbo infused without complications. Pt aware of next appt  8/31 at 0930. Discharged via w/c, no distress noted.   Patient instructed to call provider with temperature of 100.4 or greater or nausea/vomiting/ diarrhea or pain not controlled by medications

## 2022-08-10 NOTE — PATIENT INSTRUCTIONS
08/10/2022 8  7 - 16 mmol/L Final    Glucose 08/10/2022 129 (A) 65 - 100 mg/dL Final    BUN 08/10/2022 28 (A) 8 - 23 MG/DL Final    Creatinine 08/10/2022 1.40 (A) 0.6 - 1.0 MG/DL Final    GFR  08/10/2022 46 (A) >60 ml/min/1.73m2 Final    GFR Non- 08/10/2022 38 (A) >60 ml/min/1.73m2 Final    Comment:      Estimated GFR is calculated using the Modification of Diet in Renal Disease (MDRD) Study equation, reported for both  Americans (GFRAA) and non- Americans (GFRNA), and normalized to 1.73m2 body surface area. The physician must decide which value applies to the patient. The MDRD study equation should only be used in individuals age 25 or older. It has not been validated for the following: pregnant women, patients with serious comorbid conditions,or on certain medications, or persons with extremes of body size, muscle mass, or nutritional status. Calcium 08/10/2022 9.1  8.3 - 10.4 MG/DL Final    Total Bilirubin 08/10/2022 0.4  0.2 - 1.1 MG/DL Final    ALT 08/10/2022 23  12 - 65 U/L Final    AST 08/10/2022 14 (A) 15 - 37 U/L Final    Alk Phosphatase 08/10/2022 113  50 - 136 U/L Final    Total Protein 08/10/2022 8.0  6.3 - 8.2 g/dL Final    Albumin 08/10/2022 3.2  3.2 - 4.6 g/dL Final    Globulin 08/10/2022 4.8 (A) 2.3 - 3.5 g/dL Final    Albumin/Globulin Ratio 08/10/2022 0.7 (A) 1.2 - 3.5   Final        Treatment Summary has been discussed and given to patient: n/a        -------------------------------------------------------------------------------------------------------------------    Patient does express an interest in My Chart. My Chart log in information explained on the after visit summary printout at the [facility] [address] desk.     Ty Amor RN, MSN, OCN  Gynecology Nurse Navigator for Dr. Manuel Beltran  [address] [place] [address]  Phone:  [phone]  Fax: [phone]  Email: [email or web address]

## 2022-08-10 NOTE — PROGRESS NOTES
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 Only sx was rectal bleeding, resolved. History of Present Illness   Arsh Barger is a 82 y.o. who presents today with a family member for pre-chemo exam.     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 Rushing Keats, on nocturnal home  O2), NIDDM, htn, OA, right ankle surgery after fracture, hospitalization 2/8/22 for Covid-19 infection, UTI, dehydration. More recently seen by them and underwent PET scan after a more recent CT abd (reportedly done for abdominal discomfort and blood per rectum) reportedly had shown abdominal mass. PET scan revealed hypermetabolic multiple abdominal masses as well as right  cardiophrenic mass concerning for malignancy including lymphoma. A hypermetabolic 4.1 cm fluid and gas containing collection located at rectosigmoid junction and uterine fundus concerning for possible abscess also noted. A mildly hypermetabolic consolidative  appearing opacities in the right greater than left lung most consistent with subacute infection/inflammatory process also noted. Denies any recent fevers or drenching night sweats, appears non-toxic. 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 She has no GI//PULM/CV/Neuro complaints today. She sates she is tolerating chemotherapy well. Pain is controlled with tramadol.      Past Medical History     Past Medical History: Acids (FISH OIL) 1000 MG CAPS, Take 1 capsule by mouth daily, Disp: , Rfl:     umeclidinium-vilanterol (ANORO ELLIPTA) 62.5-25 MCG/INH AEPB inhaler, INHALE 1 PUFF BY MOUTH EVERY DAY, Disp: , Rfl:      Allergies   Metformin and Penicillins    Social History     Social History       Tobacco History       Smoking Status  Former      Smokeless Tobacco Use  Never              Alcohol History       Alcohol Use Status  Never              Drug Use       Drug Use Status  Never              Sexual Activity       Sexually Active  Not Asked                    Family History     Family History   Problem Relation Age of Onset    Colon Cancer Sister     Cancer Sister         Bladder    Cancer Brother         bladder       Review of Systems   Review of Systems   Constitutional: Negative. HENT: Negative. Eyes: Negative. Respiratory: Negative. Cardiovascular: Negative. Gastrointestinal: Negative. Endocrine: Negative. Genitourinary: Negative. Musculoskeletal: Negative. Skin: Negative. Allergic/Immunologic: Negative. Neurological: Negative. Hematological: Negative. Psychiatric/Behavioral: Negative. Physical Exam     ECOG PERFORMANCE STATUS - 2 - Ambulatory and capable of all selfcare but unable to carry out any work activities. Up and about more than 50% of waking hours. Pain - /10. Mild to moderate pain, requiring medication - see MAR     Fatigue - No flowsheet data found. Distress - No flowsheet data found. Height 5' 2.5\" (1.588 m), weight 187 lb (84.8 kg). Physical Exam  Vitals reviewed. Exam conducted with a chaperone present. Constitutional:       Appearance: Normal appearance. Cardiovascular:      Rate and Rhythm: Normal rate and regular rhythm. Pulses: Normal pulses. Heart sounds: Normal heart sounds. Pulmonary:      Effort: Pulmonary effort is normal. No respiratory distress. Breath sounds: Normal breath sounds.    Abdominal:      General: Abdomen is flat. There is no distension. Palpations: Abdomen is soft. There is no mass. Tenderness: There is no abdominal tenderness. There is no right CVA tenderness, left CVA tenderness, guarding or rebound. Hernia: No hernia is present. Musculoskeletal:      Cervical back: Normal range of motion and neck supple. Skin:     General: Skin is warm and dry. Neurological:      General: No focal deficit present. Mental Status: She is alert and oriented to person, place, and time. Psychiatric:         Mood and Affect: Mood normal.         Behavior: Behavior normal.         Thought Content:  Thought content normal.         Judgment: Judgment normal.       Labs        Recent Results (from the past 48 hour(s))   Cancer Antigen 125    Collection Time: 08/10/22  9:29 AM   Result Value Ref Range     56 (H) 1.5 - 35.0 U/mL   Magnesium    Collection Time: 08/10/22  9:29 AM   Result Value Ref Range    Magnesium 2.2 1.8 - 2.4 mg/dL   CBC With Auto Differential    Collection Time: 08/10/22  9:29 AM   Result Value Ref Range    WBC 5.6 4.3 - 11.1 K/uL    RBC 3.37 (L) 4.05 - 5.2 M/uL    Hemoglobin 10.7 (L) 11.7 - 15.4 g/dL    Hematocrit 33.2 (L) 35.8 - 46.3 %    MCV 98.5 (H) 79.6 - 97.8 FL    MCH 31.8 26.1 - 32.9 PG    MCHC 32.2 31.4 - 35.0 g/dL    RDW 16.6 (H) 11.9 - 14.6 %    Platelets 041 248 - 357 K/uL    MPV 8.7 (L) 9.4 - 12.3 FL    nRBC 0.00 0.0 - 0.2 K/uL    Differential Type AUTOMATED      Seg Neutrophils 58 43 - 78 %    Lymphocytes 28 13 - 44 %    Monocytes 11 4.0 - 12.0 %    Eosinophils % 2 0.5 - 7.8 %    Basophils 1 0.0 - 2.0 %    Immature Granulocytes 1 0.0 - 5.0 %    Segs Absolute 3.2 1.7 - 8.2 K/UL    Absolute Lymph # 1.6 0.5 - 4.6 K/UL    Absolute Mono # 0.6 0.1 - 1.3 K/UL    Absolute Eos # 0.1 0.0 - 0.8 K/UL    Basophils Absolute 0.0 0.0 - 0.2 K/UL    Absolute Immature Granulocyte 0.1 0.0 - 0.5 K/UL   Comprehensive metabolic panel    Collection Time: 08/10/22  9:29 AM   Result Value Ref Range    Sodium 136 136 - 145 mmol/L    Potassium 4.4 3.5 - 5.1 mmol/L    Chloride 103 98 - 107 mmol/L    CO2 25 21 - 32 mmol/L    Anion Gap 8 7 - 16 mmol/L    Glucose 129 (H) 65 - 100 mg/dL    BUN 28 (H) 8 - 23 MG/DL    Creatinine 1.40 (H) 0.6 - 1.0 MG/DL    GFR  46 (L) >60 ml/min/1.73m2    GFR Non- 38 (L) >60 ml/min/1.73m2    Calcium 9.1 8.3 - 10.4 MG/DL    Total Bilirubin 0.4 0.2 - 1.1 MG/DL    ALT 23 12 - 65 U/L    AST 14 (L) 15 - 37 U/L    Alk Phosphatase 113 50 - 136 U/L    Total Protein 8.0 6.3 - 8.2 g/dL    Albumin 3.2 3.2 - 4.6 g/dL    Globulin 4.8 (H) 2.3 - 3.5 g/dL    Albumin/Globulin Ratio 0.7 (L) 1.2 - 3.5          Lab Results   Component Value Date     56 (H) 08/10/2022        Pathology    Name:    Princess Hernandez   Accession Number:   [id number]   MR #   [id number]   Date Obtained:   4/28/2022              \"ABDOMINAL MASS\":  ADENOCARCINOMA, CONSISTENT WITH MULLERIAN ORIGIN. Sign Out Date: 5/3/2022  Cain Llanes MD                           STF- ER/[id number] BY IHC                                                                                   Status:  Reviewed By:    Aida Llanes MD on 5/2/2022                                 Interpretation                       [employer] [place] Flow Cytometric Analysis     Diagnosis:     No flow immunophenotypic evidence of a lymphoproliferative disorder. See full report in [address] as errors can occur when results are   imbedded into this report. 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 Dr. Tabitha ladd.        Antibody/Test               Marker For                              Result   Keratin AE1/AE3             Broad spectrum epithelial marker                  Positive   Cytokeratin 7               Lung, breast, upper GE, serous ovary                  Positive   Cytokeratin 20               Colon, mucinous ovary, urothelium                  Negative   CDX2                    Gastrointestinal carcinomas                       Negative   GATA3               Urothelial, breast carcinoma                       Negative   Calretinin                Mesothelioma                                 Negative   ER                    Estrogen receptor                          Positive   Napsin                     Lung adenocarcinoma                           Negative   [place]-8                    Renal cell carcinoma                           Positive   TTF-1                     Lung and thyroid adenocarcinoma                 Negative   WT-1                    Serous carcinoma, mesothelioma                 Positive     Imaging/Diagnostics Last 24 Hours   No results found. Radiology:    CT Result (most recent):  CT CHEST ABDOMEN PELVIS W CONTRAST 06/27/2022    Narrative  CT CHEST, ABDOMEN AND PELVIS WITH INTRAVENOUS CONTRAST DATED 6/27/2022. History: Follow-up mixed mullerian tumor. Comparison: PET scan 3/29/2022    Technique:   Multiple contiguous helical CT images reconstructed at 5 mm were  obtained from the base of the neck to the ischial tuberosities following oral  and 100 cc Isovue-370 without acute complication. All CT scans performed at  this facility use one or all of the following: Automated exposure control,  adjustment of the mA and/or kVp according to patient's size, iterative  reconstruction. Findings:  CT Chest:  The base of the neck is unremarkable in appearance. No evolving axillary,  mediastinal, or hilar lymphadenopathy is seen.  The prior hypermetabolic  epicardial nodule likely representing an epicardial lymph node has decreased in  size now seen on image 44 measuring 9 mm and previously measured 16 mm when  measured using a similar technique. An additional hypermetabolic nodule is seen  in the right lateral chest wall at the ninth intercostal space. Nodular density  at this level has not clearly changed best appreciated on axial image 55. The  thoracic aorta is normal in caliber. Evaluation with lung windows demonstrates no new suspicious pulmonary lesion. Only improving inflammatory appearing changes are seen in the right lower lobe  with only mild residual likely scarring now seen on axial image 28. No pleural  effusion is seen. Lungs are expanded without evidence for pneumothorax. No  evolving aggressive osseous lesion is seen. CT ABDOMEN:  The Liver is homogeneous in attenuation. The spleen is homogeneous in  attenuation. No contour deforming or enhancing mass lesions are seen of the  pancreas or adrenal glands. The gallbladder infiltrates multiple faint stones  without acute inflammatory changes. The kidneys enhance symmetrically and no  evidence of hydronephrosis is seen. The visualized loops of small bowel and colon are normal in caliber. No free  fluid and no free air is seen in the abdomen. No evolving adenopathy is seen of  the abdomen. Rather, some improvement is seen in all prior hypermetabolic  mesenteric masses when remeasured using similar technique.  The prior 3.2 cm left  periaortic soft tissue mass is now seen on image 60 measuring 2.3 cm, the prior  3.7 cm mass adjacent to the pancreatic head is now seen on axial image 16  measuring 1.9 cm, the prior 2.6 cm left para-aortic mass is now seen on axial  image 60 measuring 2.3 cm, the prior 3.5 cm left anterior peritoneal mass is now  seen on axial image 72 measuring 3.1 cm, the prior 2.8 cm central left abdominal  mesenteric mass is now seen on axial image 73 measuring 1.7 cm, and the prior  8.9 cm x 5.1 cm central right lower quadrant mass is now seen on axial image 88  measuring 5.5 cm x 4.3 cm. The abdominal aorta demonstrates advanced  atherosclerotic calcification. No evolving aggressive osseous lesion is seen. CT PELVIS:  No abnormal pelvic fluid collections are present. No evolving pelvic adenopathy  is seen. Improving hypermetabolic masses are seen in the right pelvis. The prior  6.4 cm x 5 cm mass now measures 3.9 cm x 2.3 cm. Hypermetabolic tumor extends to  the uterus. Prior abnormal gas within the endometrium persist although this does  appear improved. The urinary bladder is unremarkable. No evolving aggressive  osseous lesion is seen. Impression  1. Improving chest soft tissue, mesenteric and pelvic masses as described  above. Only one small right lateral chest wall soft tissue nodule shows no  significant interval change. In addition, improving pulmonary parenchymal  changes are seen in the right lower lobe although the prior appearance favors a  benign inflammatory process. No findings concerning for disease progression are  seen at any level. This report was made using voice transcription. Despite my best efforts to avoid  any, transcription errors may persist. If there is any question about the  accuracy of the report or need for clarification, then please call [phone], or text me through [employer] for clarification or correction. PET Results (most recent):  PET CT SKULL BASE TO MID THIGH 03/29/2022    Narrative  PET/CT    Indication: Abdominal mass on outside CT    Radiopharmaceutical: 14.4 mCi F18-FDG, intravenously. Technique: Imaging was performed from the skull through the proximal thighs  using routine PET/CT acquisition protocol. Imaging was performed approximately  60 minutes post injection. Oral contrast was administered.  Radiation dose  reduction techniques were used for this study:  Our CT scanners use one or all  of the following: Automated exposure control, adjustment of the mA and/or kVp  according to patient's size, iterative reconstruction. Serum glucose: 93 mg/dL prior to injection. Comparison studies: None available. Findings:    Head and Neck: No enlarged or hypermetabolic lymph nodes within the neck. Chest: A lymph node in the right cardiophrenic fat is hypermetabolic, measuring  1.6 cm in maximum SUV of 10.9. Hypermetabolic nodular and slightly consolidative appearing opacities in the  posterior right upper and lower lobes and to a lesser extent the left lower  lobe. Abdomen/Pelvis: Hypermetabolic solid masses throughout the abdomen index as  follows:  Peripancreatic/periportal mass measuring 3.7 cm, maximum SUV 20.3.  3.2 cm mass medially to the gastric fundus, maximum SUV 13.2  Left para-aortic mass measuring 2.6 cm, maximum SUV 15.6. Omental mass measuring 3.5 cm, maximum SUV 13.3. Left mesenteric mass measuring 2.8 cm, maximum SUV 16.7. Right lower quadrant mass measuring 8.9 x 5.1 cm, maximum SUV 19.5. Right pelvic sidewall mass measuring 6.4 x 5.0 cm, axillary SUV 26. Hypermetabolic gas and fluid containing collection measuring 4.1 cm adjacent to  the sigmoid colon contacting the uterine fundus. Impression  1. Hypermetabolic right cardiophrenic and abdominal masses as above concerning  for malignancy such as lymphoma. 2.  Hypermetabolic 4.1 cm fluid and gas-containing collection located between  the rectosigmoid junction and uterine fundus concerning for a possible abscess  or pyomyoma. Recommend correlation for any evidence of infection. 3.  Mildly hypermetabolic nodular and slightly consolidative appearing opacities  in the right greater than left lungs appearance most consistent with a subacute  infectious/inflammatory process. The study was referred to the imaging navigator to expedite delivery of results. 12    Mammo Results (most recent):  No results found for this or any previous visit from the past 365 days.          US Result (most recent):  US ABDOMINAL MASS BIOPSY PERCUTANEOUS 04/28/2022    Narrative  History: Abdominal masses    Consent: Informed written and oral consent was obtained from the patient after  explanation of benefits and risks (including, but not limited to: Infection,  Hemorrhage, and Visceral injury) of procedure. The patient's questions were  answered and requested that we proceed. Procedure:  Sterile barrier technique (including:  cap, mask, sterile gloves,  sterile sheet, hand hygiene, and chlorhexidene for cutaneous antisepsis) were  used. Following routine prep and drape of the upper abdomen, a local field  block with 1% lidocaine was achieved. Using real-time ultrasound guidance, a 17  gauge needle was advanced into the superficial aspect of the left upper quadrant  abdominal mass. Through this base needle, 4 18 gauge core biopsies were  performed. The postprocedure ultrasound demonstrates no evidence of hematoma. Complications: None. Medications:  Under physician supervision, intraservice time of 17 minutes of  moderate intravenous conscious sedation was performed by administering Versed,  Fentanyl, intravenously. Continuous pulse oximetry, heart rate, and blood  pressure monitoring was performed with an independent, trained observer present. Impression  Technically successful ultrasound guided left upper quadrant  abdominal mass biopsy. The gas and fluid filled focus in the pelvis is not  amenable to percutaneous drainage or biopsy. Specimen: Sent to cytopathology. Plan: The patient will recover for approximately 1 hour         Assessment       Diagnosis Orders   1.  Malignant neoplasm of ovary, unspecified laterality [facility])          Specialty Problems          Oncology Problems    Malignant neoplasm of ovary ([facility] [address]                 Pt tolerating chemo therapy well   CA-125 continued to improve  Labs reviewed    Plan   Proceed with cycle 5 carbo  Precautions reviewed  Con't tramadol PRN pain  Con't gcsf  RTC in 3 weeks for labs and pre-chemo and infusion or PRN - disc plans for parp maintenance               Jil Paz [facility]  [address] [address]  Office : [phone]  Fax : [phone]

## 2022-08-23 ASSESSMENT — ENCOUNTER SYMPTOMS
EYES NEGATIVE: 1
GASTROINTESTINAL NEGATIVE: 1
ALLERGIC/IMMUNOLOGIC NEGATIVE: 1
RESPIRATORY NEGATIVE: 1

## 2022-08-29 DIAGNOSIS — R97.1 ELEVATED CANCER ANTIGEN 125 (CA 125): ICD-10-CM

## 2022-08-29 DIAGNOSIS — C56.9 OVARY CANCER, UNSPECIFIED LATERALITY (HCC): Primary | ICD-10-CM

## 2022-08-30 ASSESSMENT — ENCOUNTER SYMPTOMS
GASTROINTESTINAL NEGATIVE: 1
EYES NEGATIVE: 1
RESPIRATORY NEGATIVE: 1
ALLERGIC/IMMUNOLOGIC NEGATIVE: 1

## 2022-08-30 NOTE — PROGRESS NOTES
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 who presents today with a family member for pre-chemo exam.     Elevated CA-125 at dx. Possible recent hx GI perf/diverticulitis?, imaging 3/2022. Only sx was rectal bleeding, resolved. 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 Nabil Hearn, on nocturnal home  O2), NIDDM, htn, OA, right ankle surgery after fracture, hospitalization 2/8/22 for Covid-19 infection, UTI, dehydration. More recently seen by them and underwent PET scan after a more recent CT abd (reportedly done for abdominal discomfort and blood per rectum) reportedly had shown abdominal mass. PET scan revealed hypermetabolic multiple abdominal masses as well as right  cardiophrenic mass concerning for malignancy including lymphoma. A hypermetabolic 4.1 cm fluid and gas containing collection located at rectosigmoid junction and uterine fundus concerning for possible abscess also noted. A mildly hypermetabolic consolidative  appearing opacities in the right greater than left lung most consistent with subacute infection/inflammatory process also noted. Denies any recent fevers or drenching night sweats, appears non-toxic. 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 She has no GI//PULM/CV/Neuro complaints today. She sates she is tolerating chemotherapy well. Pain is controlled with tramadol.      Past Medical History     Past Medical History:   Diagnosis Date    COPD (chronic obstructive pulmonary disease) (HCC)     Depression     Diabetes ([facility] [address]     Hypertension         Past Surgical History     Past Surgical History:   Procedure Laterality Date    ANKLE FRACTURE SURGERY      ankle broken    IR PORT PLACEMENT EQUAL OR GREATER THAN 5 YEARS  5/11/2022    IR PORT PLACEMENT EQUAL OR GREATER THAN 5 YEARS  5/11/2022    IR PORT PLACEMENT EQUAL OR GREATER THAN 5 YEARS 5/11/2022 [facility]OLOG[facility] SPECIALS    US ABDOMINAL MASS BIOPSY PERCUTANEOUS  4/28/2022    US ABDOMINAL MASS BIOPSY PERCUTANEOUS 4/28/2022 [facility] US        Medications      Current Outpatient Medications:     prochlorperazine (COMPAZINE) 5 MG tablet, TAKE 1 TABLET BY MOUTH EVERY 6 HOURS AS NEEDED FOR NAUSEA OR VOMITING, Disp: , Rfl:     ondansetron (ZOFRAN-ODT) 4 MG disintegrating tablet, , Disp: , Rfl:     albuterol sulfate HFA (PROVENTIL;VENTOLIN;PROAIR) 108 (90 Base) MCG/ACT inhaler, INHALE 2 PUFFS BY MOUTH EVERY 6 HOURS AS NEEDED, Disp: , Rfl:     aspirin 81 MG chewable tablet, Take 81 mg by mouth daily, Disp: , Rfl:     calcium carbonate-vitamin D 600-200 MG-UNIT TABS, Take 2 tablets by mouth daily, Disp: , Rfl:     cyanocobalamin 500 MCG tablet, Take 500 mcg by mouth daily, Disp: , Rfl:     glimepiride (AMARYL) 1 MG tablet, Take 1 mg by mouth daily, Disp: , Rfl:     ibuprofen (ADVIL;MOTRIN) 200 MG tablet, Take 3 tablets by mouth as needed, Disp: , Rfl:     lisinopril-hydroCHLOROthiazide (PRINZIDE;ZESTORETIC) 10-12.5 MG per tablet, Take 1 tablet by mouth daily, Disp: , Rfl:     LORazepam (ATIVAN) 0.5 MG tablet, Take 0.5 mg by mouth every 8 hours as needed.  , Disp: , Rfl:     Omega-3 Fatty Acids (FISH OIL) 1000 MG CAPS, Take 1 capsule by mouth daily, Disp: , Rfl:     umeclidinium-vilanterol (ANORO ELLIPTA) 62.5-25 MCG/INH AEPB inhaler, INHALE 1 PUFF BY MOUTH EVERY DAY, Disp: , Rfl:     lidocaine-prilocaine (EMLA) 2.5-2.5 % cream, Apply a dime size topically to port as needed 45 minutes prior to your lab or infusion appointment. , Disp: 5 g, Rfl: 1     Allergies   Metformin and Penicillins    Social History     Social History       Tobacco History       Smoking Status  Former      Smokeless Tobacco Use  Never              Alcohol History       Alcohol Use Status  Never              Drug Use       Drug Use Status  Never              Sexual Activity       Sexually Active  Not Asked                    Family History     Family History   Problem Relation Age of Onset    Colon Cancer Sister     Cancer Sister         Bladder    Cancer Brother         bladder       Review of Systems   Review of Systems   Constitutional:  Positive for fatigue. HENT: Negative. Eyes: Negative. Respiratory: Negative. Cardiovascular: Negative. Gastrointestinal: Negative. Endocrine: Negative. Genitourinary: Negative. Musculoskeletal:  Positive for arthralgias and myalgias. Skin: Negative. Allergic/Immunologic: Negative. Neurological: Negative. Hematological: Negative. Psychiatric/Behavioral: Negative. All other systems reviewed and are negative. Physical Exam     ECOG PERFORMANCE STATUS - 2 - Ambulatory and capable of all selfcare but unable to carry out any work activities. Up and about more than 50% of waking hours. Pain - 7/10. Mild to moderate pain, requiring medication - see MAR     Fatigue - No flowsheet data found. Distress - No flowsheet data found. Blood pressure (!) 95/56, pulse 94, temperature 98.1 °F (36.7 °C), resp. rate 16, height 5' 2.5\" (1.588 m), weight 185 lb 1.6 oz (84 kg), SpO2 92 %. Physical Exam  Vitals reviewed. Exam conducted with a chaperone present. Constitutional:       Appearance: Normal appearance. HENT:      Head: Normocephalic and atraumatic. Nose: No congestion. Cardiovascular:      Rate and Rhythm: Normal rate and regular rhythm. Pulses: Normal pulses. Heart sounds: Normal heart sounds.    Pulmonary: Effort: Pulmonary effort is normal. No respiratory distress. Breath sounds: Normal breath sounds. Abdominal:      General: Abdomen is flat. There is no distension. Palpations: Abdomen is soft. There is no mass. Musculoskeletal:      Cervical back: Normal range of motion and neck supple. Skin:     General: Skin is warm and dry. Neurological:      General: No focal deficit present. Mental Status: She is alert and oriented to person, place, and time. Psychiatric:         Mood and Affect: Mood normal.         Behavior: Behavior normal.         Thought Content:  Thought content normal.         Judgment: Judgment normal.       Labs        Recent Results (from the past 48 hour(s))   CBC with Auto Differential    Collection Time: 08/31/22  8:50 AM   Result Value Ref Range    WBC 7.4 4.3 - 11.1 K/uL    RBC 3.49 (L) 4.05 - 5.2 M/uL    Hemoglobin 11.4 (L) 11.7 - 15.4 g/dL    Hematocrit 35.1 (L) 35.8 - 46.3 %    .6 (H) 79.6 - 97.8 FL    MCH 32.7 26.1 - 32.9 PG    MCHC 32.5 31.4 - 35.0 g/dL    RDW 15.9 (H) 11.9 - 14.6 %    Platelets 343 822 - 642 K/uL    MPV 8.8 (L) 9.4 - 12.3 FL    nRBC 0.00 0.0 - 0.2 K/uL    Seg Neutrophils 58 43 - 78 %    Lymphocytes 29 13 - 44 %    Monocytes 11 4.0 - 12.0 %    Eosinophils % 1 0.5 - 7.8 %    Basophils 0 0.0 - 2.0 %    Immature Granulocytes 1 0.0 - 5.0 %    Segs Absolute 4.3 1.7 - 8.2 K/UL    Absolute Lymph # 2.2 0.5 - 4.6 K/UL    Absolute Mono # 0.8 0.1 - 1.3 K/UL    Absolute Eos # 0.1 0.0 - 0.8 K/UL    Basophils Absolute 0.0 0.0 - 0.2 K/UL    Absolute Immature Granulocyte 0.0 0.0 - 0.5 K/UL    Differential Type AUTOMATED     Comprehensive Metabolic Panel    Collection Time: 08/31/22  8:50 AM   Result Value Ref Range    Sodium 135 (L) 136 - 145 mmol/L    Potassium 4.2 3.5 - 5.1 mmol/L    Chloride 103 101 - 110 mmol/L    CO2 26 21 - 32 mmol/L    Anion Gap 6 4 - 13 mmol/L    Glucose 101 (H) 65 - 100 mg/dL    BUN 28 (H) 8 - 23 MG/DL    Creatinine 1.50 (H) 0.6 - 1.0 MG/DL    GFR African American 42 (L) >60 ml/min/1.73m2    GFR Non- 35 (L) >60 ml/min/1.73m2    Calcium 9.6 8.3 - 10.4 MG/DL    Total Bilirubin 0.4 0.2 - 1.1 MG/DL    ALT 23 12 - 65 U/L    AST 16 15 - 37 U/L    Alk Phosphatase 117 50 - 136 U/L    Total Protein 8.1 6.3 - 8.2 g/dL    Albumin 3.4 3.2 - 4.6 g/dL    Globulin 4.7 (H) 2.3 - 3.5 g/dL    Albumin/Globulin Ratio 0.7 (L) 1.2 - 3.5     Magnesium    Collection Time: 08/31/22  8:50 AM   Result Value Ref Range    Magnesium 2.4 1.8 - 2.4 mg/dL   Cancer Antigen 125    Collection Time: 08/31/22  8:50 AM   Result Value Ref Range     34 1.5 - 35.0 U/mL        Lab Results   Component Value Date     34 08/31/2022      Component      Latest Ref Rng & Units 8/10/2022 7/20/2022 6/29/2022 6/8/2022           9:29 AM  9:15 AM 10:09 AM 12:34 PM   ,C125      1.5 - 35.0 U/mL 56 (H) 163 (H) 546 (H) 2479 (H)     Component      Latest Ref Rng & Units 5/18/2022           1:25 PM   ,C125      1.5 - 35.0 U/mL 3,987 (H)       Pathology    Name:    Zuhair Nogueira   Accession Number:   [id number]   MR #   [id number]   Date Obtained:   4/28/2022              \"ABDOMINAL MASS\":  ADENOCARCINOMA, CONSISTENT WITH MULLERIAN ORIGIN. Sign Out Date: 5/3/2022  Karolina Walton MD                           [facility]- ER/DE/[id number] BY IHC                                                                                   Status:  Reviewed By:    Reagan Walton MD on 5/2/2022                                 Interpretation                       Slidely Flow Cytometric Analysis     Diagnosis:     No flow immunophenotypic evidence of a lymphoproliferative disorder. See full report in [facility] as errors can occur when results are   imbedded into this report. [facility]-IMMUNOHISTOCHEMISTRY                                                                                   Status:  Reviewed By:    Vitaly Elias.  Todd Walton MD on 5/3/2022 Interpretation                       Immunohistochemical Stain Panel:          Interpretation:  Immunohistochemical findings most consistent with   adenocarcinoma of Müllerian origin. Dr. Kenyatta Cole concurs. Antibody/Test               Marker For                              Result   Keratin AE1/AE3             Broad spectrum epithelial marker                  Positive   Cytokeratin 7               Lung, breast, upper GE, serous ovary                  Positive   Cytokeratin 20               Colon, mucinous ovary, urothelium                  Negative   CDX2                    Gastrointestinal carcinomas                       Negative   GATA3               Urothelial, breast carcinoma                       Negative   Calretinin                Mesothelioma                                 Negative   ER                    Estrogen receptor                          Positive   Napsin                     Lung adenocarcinoma                           Negative   [place]-8                    Renal cell carcinoma                           Positive   TTF-1                     Lung and thyroid adenocarcinoma                 Negative   WT-1                    Serous carcinoma, mesothelioma                 Positive     Imaging/Diagnostics Last 24 Hours   No results found. Radiology:    CT Result (most recent):  CT CHEST ABDOMEN PELVIS W CONTRAST 06/27/2022    Narrative  CT CHEST, ABDOMEN AND PELVIS WITH INTRAVENOUS CONTRAST DATED 6/27/2022. History: Follow-up mixed mullerian tumor. Comparison: PET scan 3/29/2022    Technique:   Multiple contiguous helical CT images reconstructed at 5 mm were  obtained from the base of the neck to the ischial tuberosities following oral  and 100 cc Isovue-370 without acute complication.   All CT scans performed at  this facility use one or all of the following: Automated exposure control,  adjustment of the mA and/or kVp according to patient's size, iterative  reconstruction. Findings:  CT Chest:  The base of the neck is unremarkable in appearance. No evolving axillary,  mediastinal, or hilar lymphadenopathy is seen. The prior hypermetabolic  epicardial nodule likely representing an epicardial lymph node has decreased in  size now seen on image 44 measuring 9 mm and previously measured 16 mm when  measured using a similar technique. An additional hypermetabolic nodule is seen  in the right lateral chest wall at the ninth intercostal space. Nodular density  at this level has not clearly changed best appreciated on axial image 55. The  thoracic aorta is normal in caliber. Evaluation with lung windows demonstrates no new suspicious pulmonary lesion. Only improving inflammatory appearing changes are seen in the right lower lobe  with only mild residual likely scarring now seen on axial image 28. No pleural  effusion is seen. Lungs are expanded without evidence for pneumothorax. No  evolving aggressive osseous lesion is seen. CT ABDOMEN:  The Liver is homogeneous in attenuation. The spleen is homogeneous in  attenuation. No contour deforming or enhancing mass lesions are seen of the  pancreas or adrenal glands. The gallbladder infiltrates multiple faint stones  without acute inflammatory changes. The kidneys enhance symmetrically and no  evidence of hydronephrosis is seen. The visualized loops of small bowel and colon are normal in caliber. No free  fluid and no free air is seen in the abdomen. No evolving adenopathy is seen of  the abdomen. Rather, some improvement is seen in all prior hypermetabolic  mesenteric masses when remeasured using similar technique.  The prior 3.2 cm left  periaortic soft tissue mass is now seen on image 60 measuring 2.3 cm, the prior  3.7 cm mass adjacent to the pancreatic head is now seen on axial image 16  measuring 1.9 cm, the prior 2.6 cm left para-aortic mass is now seen on axial  image 60 measuring 2.3 cm, the prior 3.5 cm left anterior peritoneal mass is now  seen on axial image 72 measuring 3.1 cm, the prior 2.8 cm central left abdominal  mesenteric mass is now seen on axial image 73 measuring 1.7 cm, and the prior  8.9 cm x 5.1 cm central right lower quadrant mass is now seen on axial image 88  measuring 5.5 cm x 4.3 cm. The abdominal aorta demonstrates advanced  atherosclerotic calcification. No evolving aggressive osseous lesion is seen. CT PELVIS:  No abnormal pelvic fluid collections are present. No evolving pelvic adenopathy  is seen. Improving hypermetabolic masses are seen in the right pelvis. The prior  6.4 cm x 5 cm mass now measures 3.9 cm x 2.3 cm. Hypermetabolic tumor extends to  the uterus. Prior abnormal gas within the endometrium persist although this does  appear improved. The urinary bladder is unremarkable. No evolving aggressive  osseous lesion is seen. Impression  1. Improving chest soft tissue, mesenteric and pelvic masses as described  above. Only one small right lateral chest wall soft tissue nodule shows no  significant interval change. In addition, improving pulmonary parenchymal  changes are seen in the right lower lobe although the prior appearance favors a  benign inflammatory process. No findings concerning for disease progression are  seen at any level. This report was made using voice transcription. Despite my best efforts to avoid  any, transcription errors may persist. If there is any question about the  accuracy of the report or need for clarification, then please call [phone], or text me through perfectserv for clarification or correction. PET Results (most recent):  PET CT SKULL BASE TO MID THIGH 03/29/2022    Narrative  PET/CT    Indication: Abdominal mass on outside CT    Radiopharmaceutical: 14.4 mCi F18-FDG, intravenously. Technique: Imaging was performed from the skull through the proximal thighs  using routine PET/CT acquisition protocol.  Imaging was performed approximately  60 minutes post injection. Oral contrast was administered. Radiation dose  reduction techniques were used for this study:  Our CT scanners use one or all  of the following: Automated exposure control, adjustment of the mA and/or kVp  according to patient's size, iterative reconstruction. Serum glucose: 93 mg/dL prior to injection. Comparison studies: None available. Findings:    Head and Neck: No enlarged or hypermetabolic lymph nodes within the neck. Chest: A lymph node in the right cardiophrenic fat is hypermetabolic, measuring  1.6 cm in maximum SUV of 10.9. Hypermetabolic nodular and slightly consolidative appearing opacities in the  posterior right upper and lower lobes and to a lesser extent the left lower  lobe. Abdomen/Pelvis: Hypermetabolic solid masses throughout the abdomen index as  follows:  Peripancreatic/periportal mass measuring 3.7 cm, maximum SUV 20.3.  3.2 cm mass medially to the gastric fundus, maximum SUV 13.2  Left para-aortic mass measuring 2.6 cm, maximum SUV 15.6. Omental mass measuring 3.5 cm, maximum SUV 13.3. Left mesenteric mass measuring 2.8 cm, maximum SUV 16.7. Right lower quadrant mass measuring 8.9 x 5.1 cm, maximum SUV 19.5. Right pelvic sidewall mass measuring 6.4 x 5.0 cm, axillary SUV 26. Hypermetabolic gas and fluid containing collection measuring 4.1 cm adjacent to  the sigmoid colon contacting the uterine fundus. Impression  1. Hypermetabolic right cardiophrenic and abdominal masses as above concerning  for malignancy such as lymphoma. 2.  Hypermetabolic 4.1 cm fluid and gas-containing collection located between  the rectosigmoid junction and uterine fundus concerning for a possible abscess  or pyomyoma. Recommend correlation for any evidence of infection.   3.  Mildly hypermetabolic nodular and slightly consolidative appearing opacities  in the right greater than left lungs appearance most consistent with a subacute  infectious/inflammatory process. The study was referred to the imaging navigator to expedite delivery of results. 12    Mammo Results (most recent):  No results found for this or any previous visit from the past 365 days. US Result (most recent):  US ABDOMINAL MASS BIOPSY PERCUTANEOUS 04/28/2022    Narrative  History: Abdominal masses    Consent: Informed written and oral consent was obtained from the patient after  explanation of benefits and risks (including, but not limited to: Infection,  Hemorrhage, and Visceral injury) of procedure. The patient's questions were  answered and requested that we proceed. Procedure:  Sterile barrier technique (including:  cap, mask, sterile gloves,  sterile sheet, hand hygiene, and chlorhexidene for cutaneous antisepsis) were  used. Following routine prep and drape of the upper abdomen, a local field  block with 1% lidocaine was achieved. Using real-time ultrasound guidance, a 17  gauge needle was advanced into the superficial aspect of the left upper quadrant  abdominal mass. Through this base needle, 4 18 gauge core biopsies were  performed. The postprocedure ultrasound demonstrates no evidence of hematoma. Complications: None. Medications:  Under physician supervision, intraservice time of 17 minutes of  moderate intravenous conscious sedation was performed by administering Versed,  Fentanyl, intravenously. Continuous pulse oximetry, heart rate, and blood  pressure monitoring was performed with an independent, trained observer present. Impression  Technically successful ultrasound guided left upper quadrant  abdominal mass biopsy. The gas and fluid filled focus in the pelvis is not  amenable to percutaneous drainage or biopsy. Specimen: Sent to cytopathology. Plan: The patient will recover for approximately 1 hour         Assessment       Diagnosis Orders   1. Ovary cancer, unspecified laterality ([facility] [address]        2.  Mixed Mullerian tumor ([facility] [address]  Cancer Antigen 125    Magnesium    CBC With Auto Differential    Comprehensive metabolic panel    DISCONTINUED: 0.9 % sodium chloride infusion    DISCONTINUED: fosaprepitant (EMEND) 150 mg in sodium chloride 0.9 % 150 mL IVPB    DISCONTINUED: dexamethasone (DECADRON) 12 mg in sodium chloride 0.9 % 50 mL IVPB    DISCONTINUED: ondansetron (ZOFRAN) injection 8 mg    DISCONTINUED: CARBOplatin (PARAPLATIN) 264 mg in sodium chloride 0.9 % 250 mL chemo IVPB    DISCONTINUED: sodium chloride flush 0.9 % injection 5-40 mL    DISCONTINUED: sodium chloride (PF) 0.9 % injection 5-40 mL    DISCONTINUED: 0.9 % sodium chloride infusion    DISCONTINUED: heparin flush 100 UNIT/ML injection 500 Units    DISCONTINUED: alteplase (CATHFLO) 2 mg in sterile water 2 mL injection    DISCONTINUED: 0.9 % sodium chloride infusion    DISCONTINUED: diphenhydrAMINE (BENADRYL) injection 50 mg    DISCONTINUED: famotidine (PEPCID) injection 20 mg    DISCONTINUED: hydrocortisone sodium succinate PF (SOLU-CORTEF) injection 100 mg    DISCONTINUED: acetaminophen (TYLENOL) tablet 650 mg    DISCONTINUED: ondansetron (ZOFRAN) injection 8 mg    DISCONTINUED: EPINEPHrine PF 1 MG/ML injection (Anaphylaxis) 0.3 mg    DISCONTINUED: albuterol sulfate HFA (PROVENTIL;VENTOLIN;PROAIR) 108 (90 Base) MCG/ACT inhaler 4 puff      3. Elevated cancer antigen 125 ()          Specialty Problems          Oncology Problems    Malignant neoplasm of ovary (HCC)                 Pt tolerating chemo therapy well   CA-125 continued to improve  Labs reviewed    Plan   Proceed with cycle 6 carbo-plan on 7-8 total cycles, ca 125 still normalizing  Precautions reviewed  Con't tramadol PRN pain  Con't gcsf  RTC in 3 weeks for labs and pre-chemo and infusion or PRN - disc plans for parp maintenance v. Avastin maintainence              Amanda Dumont MD, [facility]  [address] [address]  Office : [phone]  Fax : [phone]

## 2022-08-31 ENCOUNTER — HOSPITAL ENCOUNTER (OUTPATIENT)
Dept: INFUSION THERAPY | Age: 87
Discharge: HOME | End: 2022-08-31
Payer: MEDICARE

## 2022-08-31 ENCOUNTER — OFFICE VISIT (OUTPATIENT)
Dept: ONCOLOGY | Age: 87
End: 2022-08-31
Payer: MEDICARE

## 2022-08-31 VITALS
BODY MASS INDEX: 32.8 KG/M2 | TEMPERATURE: 98.1 F | HEART RATE: 94 BPM | DIASTOLIC BLOOD PRESSURE: 56 MMHG | WEIGHT: 185.1 LBS | HEIGHT: 63 IN | SYSTOLIC BLOOD PRESSURE: 95 MMHG | OXYGEN SATURATION: 92 % | RESPIRATION RATE: 16 BRPM

## 2022-08-31 DIAGNOSIS — R97.1 ELEVATED CANCER ANTIGEN 125 (CA 125): ICD-10-CM

## 2022-08-31 DIAGNOSIS — C56.9 OVARY CANCER, UNSPECIFIED LATERALITY (HCC): ICD-10-CM

## 2022-08-31 DIAGNOSIS — C54.9 MIXED MULLERIAN TUMOR (HCC): Primary | ICD-10-CM

## 2022-08-31 DIAGNOSIS — C54.9 MIXED MULLERIAN TUMOR (HCC): ICD-10-CM

## 2022-08-31 DIAGNOSIS — C56.9 OVARY CANCER, UNSPECIFIED LATERALITY (HCC): Primary | ICD-10-CM

## 2022-08-31 LAB
ALBUMIN SERPL-MCNC: 3.4 G/DL (ref 3.2–4.6)
ALBUMIN/GLOB SERPL: 0.7 {RATIO} (ref 1.2–3.5)
ALP SERPL-CCNC: 117 U/L (ref 50–136)
ALT SERPL-CCNC: 23 U/L (ref 12–65)
ANION GAP SERPL CALC-SCNC: 6 MMOL/L (ref 4–13)
AST SERPL-CCNC: 16 U/L (ref 15–37)
BASOPHILS # BLD: 0 K/UL (ref 0–0.2)
BASOPHILS NFR BLD: 0 % (ref 0–2)
BILIRUB SERPL-MCNC: 0.4 MG/DL (ref 0.2–1.1)
BUN SERPL-MCNC: 28 MG/DL (ref 8–23)
CALCIUM SERPL-MCNC: 9.6 MG/DL (ref 8.3–10.4)
CANCER AG125 SERPL-ACNC: 34 U/ML (ref 1.5–35)
CHLORIDE SERPL-SCNC: 103 MMOL/L (ref 101–110)
CO2 SERPL-SCNC: 26 MMOL/L (ref 21–32)
CREAT SERPL-MCNC: 1.5 MG/DL (ref 0.6–1)
DIFFERENTIAL METHOD BLD: ABNORMAL
EOSINOPHIL # BLD: 0.1 K/UL (ref 0–0.8)
EOSINOPHIL NFR BLD: 1 % (ref 0.5–7.8)
ERYTHROCYTE [DISTWIDTH] IN BLOOD BY AUTOMATED COUNT: 15.9 % (ref 11.9–14.6)
GLOBULIN SER CALC-MCNC: 4.7 G/DL (ref 2.3–3.5)
GLUCOSE SERPL-MCNC: 101 MG/DL (ref 65–100)
HCT VFR BLD AUTO: 35.1 % (ref 35.8–46.3)
HGB BLD-MCNC: 11.4 G/DL (ref 11.7–15.4)
IMM GRANULOCYTES # BLD AUTO: 0 K/UL (ref 0–0.5)
IMM GRANULOCYTES NFR BLD AUTO: 1 % (ref 0–5)
LYMPHOCYTES # BLD: 2.2 K/UL (ref 0.5–4.6)
LYMPHOCYTES NFR BLD: 29 % (ref 13–44)
MAGNESIUM SERPL-MCNC: 2.4 MG/DL (ref 1.8–2.4)
MCH RBC QN AUTO: 32.7 PG (ref 26.1–32.9)
MCHC RBC AUTO-ENTMCNC: 32.5 G/DL (ref 31.4–35)
MCV RBC AUTO: 100.6 FL (ref 79.6–97.8)
MONOCYTES # BLD: 0.8 K/UL (ref 0.1–1.3)
MONOCYTES NFR BLD: 11 % (ref 4–12)
NEUTS SEG # BLD: 4.3 K/UL (ref 1.7–8.2)
NEUTS SEG NFR BLD: 58 % (ref 43–78)
NRBC # BLD: 0 K/UL (ref 0–0.2)
PLATELET # BLD AUTO: 212 K/UL (ref 150–450)
PMV BLD AUTO: 8.8 FL (ref 9.4–12.3)
POTASSIUM SERPL-SCNC: 4.2 MMOL/L (ref 3.5–5.1)
PROT SERPL-MCNC: 8.1 G/DL (ref 6.3–8.2)
RBC # BLD AUTO: 3.49 M/UL (ref 4.05–5.2)
SODIUM SERPL-SCNC: 135 MMOL/L (ref 136–145)
WBC # BLD AUTO: 7.4 K/UL (ref 4.3–11.1)

## 2022-08-31 PROCEDURE — 6360000002 HC RX W HCPCS: Performed by: OBSTETRICS & GYNECOLOGY

## 2022-08-31 PROCEDURE — 86304 IMMUNOASSAY TUMOR CA 125: CPT

## 2022-08-31 PROCEDURE — 96413 CHEMO IV INFUSION 1 HR: CPT

## 2022-08-31 PROCEDURE — 1123F ACP DISCUSS/DSCN MKR DOCD: CPT | Performed by: OBSTETRICS & GYNECOLOGY

## 2022-08-31 PROCEDURE — 36591 DRAW BLOOD OFF VENOUS DEVICE: CPT

## 2022-08-31 PROCEDURE — 99213 OFFICE O/P EST LOW 20 MIN: CPT | Performed by: OBSTETRICS & GYNECOLOGY

## 2022-08-31 PROCEDURE — 85025 COMPLETE CBC W/AUTO DIFF WBC: CPT

## 2022-08-31 PROCEDURE — 96375 TX/PRO/DX INJ NEW DRUG ADDON: CPT

## 2022-08-31 PROCEDURE — G8417 CALC BMI ABV UP PARAM F/U: HCPCS | Performed by: OBSTETRICS & GYNECOLOGY

## 2022-08-31 PROCEDURE — 83735 ASSAY OF MAGNESIUM: CPT

## 2022-08-31 PROCEDURE — 2580000003 HC RX 258: Performed by: OBSTETRICS & GYNECOLOGY

## 2022-08-31 PROCEDURE — G8400 PT W/DXA NO RESULTS DOC: HCPCS | Performed by: OBSTETRICS & GYNECOLOGY

## 2022-08-31 PROCEDURE — 1090F PRES/ABSN URINE INCON ASSESS: CPT | Performed by: OBSTETRICS & GYNECOLOGY

## 2022-08-31 PROCEDURE — 1036F TOBACCO NON-USER: CPT | Performed by: OBSTETRICS & GYNECOLOGY

## 2022-08-31 PROCEDURE — 96367 TX/PROPH/DG ADDL SEQ IV INF: CPT

## 2022-08-31 PROCEDURE — G8427 DOCREV CUR MEDS BY ELIG CLIN: HCPCS | Performed by: OBSTETRICS & GYNECOLOGY

## 2022-08-31 PROCEDURE — 80053 COMPREHEN METABOLIC PANEL: CPT

## 2022-08-31 RX ORDER — DIPHENHYDRAMINE HYDROCHLORIDE 50 MG/ML
50 INJECTION INTRAMUSCULAR; INTRAVENOUS
Status: ACTIVE | OUTPATIENT
Start: 2022-08-31 | End: 2022-08-31

## 2022-08-31 RX ORDER — SODIUM CHLORIDE 9 MG/ML
5-250 INJECTION, SOLUTION INTRAVENOUS PRN
Status: DISCONTINUED | OUTPATIENT
Start: 2022-08-31 | End: 2022-09-01 | Stop reason: HOSPADM

## 2022-08-31 RX ORDER — SODIUM CHLORIDE 0.9 % (FLUSH) 0.9 %
10 SYRINGE (ML) INJECTION PRN
Status: DISCONTINUED | OUTPATIENT
Start: 2022-08-31 | End: 2022-09-01 | Stop reason: HOSPADM

## 2022-08-31 RX ORDER — SODIUM CHLORIDE 9 MG/ML
5-40 INJECTION INTRAVENOUS PRN
Status: DISCONTINUED | OUTPATIENT
Start: 2022-08-31 | End: 2022-09-01 | Stop reason: HOSPADM

## 2022-08-31 RX ORDER — ONDANSETRON 2 MG/ML
8 INJECTION INTRAMUSCULAR; INTRAVENOUS ONCE
Status: CANCELLED | OUTPATIENT
Start: 2022-08-31 | End: 2022-08-31

## 2022-08-31 RX ORDER — EPINEPHRINE 1 MG/ML
0.3 INJECTION, SOLUTION, CONCENTRATE INTRAVENOUS PRN
Status: DISCONTINUED | OUTPATIENT
Start: 2022-08-31 | End: 2022-09-01 | Stop reason: HOSPADM

## 2022-08-31 RX ORDER — SODIUM CHLORIDE 9 MG/ML
INJECTION, SOLUTION INTRAVENOUS CONTINUOUS
Status: DISCONTINUED | OUTPATIENT
Start: 2022-08-31 | End: 2022-09-01 | Stop reason: HOSPADM

## 2022-08-31 RX ORDER — ALBUTEROL SULFATE 90 UG/1
4 AEROSOL, METERED RESPIRATORY (INHALATION) PRN
Status: CANCELLED | OUTPATIENT
Start: 2022-08-31

## 2022-08-31 RX ORDER — DIPHENHYDRAMINE HYDROCHLORIDE 50 MG/ML
50 INJECTION INTRAMUSCULAR; INTRAVENOUS
Status: CANCELLED | OUTPATIENT
Start: 2022-08-31

## 2022-08-31 RX ORDER — MEPERIDINE HYDROCHLORIDE 50 MG/ML
12.5 INJECTION INTRAMUSCULAR; INTRAVENOUS; SUBCUTANEOUS PRN
Status: CANCELLED | OUTPATIENT
Start: 2022-08-31

## 2022-08-31 RX ORDER — SODIUM CHLORIDE 0.9 % (FLUSH) 0.9 %
5-40 SYRINGE (ML) INJECTION PRN
Status: DISCONTINUED | OUTPATIENT
Start: 2022-08-31 | End: 2022-09-01 | Stop reason: HOSPADM

## 2022-08-31 RX ORDER — FAMOTIDINE 10 MG/ML
20 INJECTION, SOLUTION INTRAVENOUS
Status: CANCELLED | OUTPATIENT
Start: 2022-08-31

## 2022-08-31 RX ORDER — ACETAMINOPHEN 325 MG/1
650 TABLET ORAL
Status: ACTIVE | OUTPATIENT
Start: 2022-08-31 | End: 2022-08-31

## 2022-08-31 RX ORDER — HEPARIN SODIUM (PORCINE) LOCK FLUSH IV SOLN 100 UNIT/ML 100 UNIT/ML
500 SOLUTION INTRAVENOUS PRN
Status: CANCELLED | OUTPATIENT
Start: 2022-08-31

## 2022-08-31 RX ORDER — ALBUTEROL SULFATE 90 UG/1
4 AEROSOL, METERED RESPIRATORY (INHALATION) PRN
Status: DISCONTINUED | OUTPATIENT
Start: 2022-08-31 | End: 2022-09-01 | Stop reason: HOSPADM

## 2022-08-31 RX ORDER — HEPARIN SODIUM (PORCINE) LOCK FLUSH IV SOLN 100 UNIT/ML 100 UNIT/ML
500 SOLUTION INTRAVENOUS PRN
Status: DISCONTINUED | OUTPATIENT
Start: 2022-08-31 | End: 2022-09-01 | Stop reason: HOSPADM

## 2022-08-31 RX ORDER — SODIUM CHLORIDE 9 MG/ML
5-250 INJECTION, SOLUTION INTRAVENOUS PRN
Status: CANCELLED | OUTPATIENT
Start: 2022-08-31

## 2022-08-31 RX ORDER — SODIUM CHLORIDE 0.9 % (FLUSH) 0.9 %
5-40 SYRINGE (ML) INJECTION PRN
Status: CANCELLED | OUTPATIENT
Start: 2022-08-31

## 2022-08-31 RX ORDER — ONDANSETRON 2 MG/ML
8 INJECTION INTRAMUSCULAR; INTRAVENOUS ONCE
Status: COMPLETED | OUTPATIENT
Start: 2022-08-31 | End: 2022-08-31

## 2022-08-31 RX ORDER — ONDANSETRON 2 MG/ML
8 INJECTION INTRAMUSCULAR; INTRAVENOUS
Status: ACTIVE | OUTPATIENT
Start: 2022-08-31 | End: 2022-08-31

## 2022-08-31 RX ORDER — EPINEPHRINE 1 MG/ML
0.3 INJECTION, SOLUTION, CONCENTRATE INTRAVENOUS PRN
Status: CANCELLED | OUTPATIENT
Start: 2022-08-31

## 2022-08-31 RX ORDER — SODIUM CHLORIDE 9 MG/ML
5-40 INJECTION INTRAVENOUS PRN
Status: CANCELLED | OUTPATIENT
Start: 2022-08-31

## 2022-08-31 RX ORDER — ONDANSETRON 2 MG/ML
8 INJECTION INTRAMUSCULAR; INTRAVENOUS
Status: CANCELLED | OUTPATIENT
Start: 2022-08-31

## 2022-08-31 RX ORDER — SODIUM CHLORIDE 9 MG/ML
INJECTION, SOLUTION INTRAVENOUS CONTINUOUS
Status: CANCELLED | OUTPATIENT
Start: 2022-08-31

## 2022-08-31 RX ORDER — ACETAMINOPHEN 325 MG/1
650 TABLET ORAL
Status: CANCELLED | OUTPATIENT
Start: 2022-08-31

## 2022-08-31 RX ADMIN — Medication 10 ML: at 08:59

## 2022-08-31 RX ADMIN — SODIUM CHLORIDE, PRESERVATIVE FREE 10 ML: 5 INJECTION INTRAVENOUS at 12:17

## 2022-08-31 RX ADMIN — SODIUM CHLORIDE: 9 INJECTION, SOLUTION INTRAVENOUS at 10:40

## 2022-08-31 RX ADMIN — FOSAPREPITANT 150 MG: 150 INJECTION, POWDER, LYOPHILIZED, FOR SOLUTION INTRAVENOUS at 10:55

## 2022-08-31 RX ADMIN — ONDANSETRON 8 MG: 2 INJECTION INTRAMUSCULAR; INTRAVENOUS at 11:18

## 2022-08-31 RX ADMIN — DEXAMETHASONE SODIUM PHOSPHATE 12 MG: 4 INJECTION, SOLUTION INTRAMUSCULAR; INTRAVENOUS at 11:20

## 2022-08-31 RX ADMIN — CARBOPLATIN 264 MG: 10 INJECTION, SOLUTION INTRAVENOUS at 11:40

## 2022-08-31 ASSESSMENT — PATIENT HEALTH QUESTIONNAIRE - PHQ9
SUM OF ALL RESPONSES TO PHQ QUESTIONS 1-9: 5
5. POOR APPETITE OR OVEREATING: 0
8. MOVING OR SPEAKING SO SLOWLY THAT OTHER PEOPLE COULD HAVE NOTICED. OR THE OPPOSITE, BEING SO FIGETY OR RESTLESS THAT YOU HAVE BEEN MOVING AROUND A LOT MORE THAN USUAL: 0
SUM OF ALL RESPONSES TO PHQ9 QUESTIONS 1 & 2: 0
2. FEELING DOWN, DEPRESSED OR HOPELESS: 0
7. TROUBLE CONCENTRATING ON THINGS, SUCH AS READING THE NEWSPAPER OR WATCHING TELEVISION: 0
6. FEELING BAD ABOUT YOURSELF - OR THAT YOU ARE A FAILURE OR HAVE LET YOURSELF OR YOUR FAMILY DOWN: 0
3. TROUBLE FALLING OR STAYING ASLEEP: 3
9. THOUGHTS THAT YOU WOULD BE BETTER OFF DEAD, OR OF HURTING YOURSELF: 0
SUM OF ALL RESPONSES TO PHQ QUESTIONS 1-9: 5
1. LITTLE INTEREST OR PLEASURE IN DOING THINGS: 0
4. FEELING TIRED OR HAVING LITTLE ENERGY: 2

## 2022-08-31 ASSESSMENT — ANXIETY QUESTIONNAIRES
4. TROUBLE RELAXING: 0
GAD7 TOTAL SCORE: 3
2. NOT BEING ABLE TO STOP OR CONTROL WORRYING: 0
7. FEELING AFRAID AS IF SOMETHING AWFUL MIGHT HAPPEN: 0
5. BEING SO RESTLESS THAT IT IS HARD TO SIT STILL: 0
3. WORRYING TOO MUCH ABOUT DIFFERENT THINGS: 0
6. BECOMING EASILY ANNOYED OR IRRITABLE: 3
IF YOU CHECKED OFF ANY PROBLEMS ON THIS QUESTIONNAIRE, HOW DIFFICULT HAVE THESE PROBLEMS MADE IT FOR YOU TO DO YOUR WORK, TAKE CARE OF THINGS AT HOME, OR GET ALONG WITH OTHER PEOPLE: SOMEWHAT DIFFICULT
1. FEELING NERVOUS, ANXIOUS, OR ON EDGE: 0

## 2022-08-31 NOTE — PROGRESS NOTES
Pt arrived via w/c. Premeds and Carbo completed without complications. Pt aware of next appt 9/21 at 1030. Discharged via w/c, no distress noted.   Patient instructed to call provider with temperature of 100.4 or greater or nausea/vomiting/ diarrhea or pain not controlled by medications

## 2022-08-31 NOTE — PATIENT INSTRUCTIONS
Patient Instructions from Today's Visit    Reason for Visit:  PreTreatment    Diagnosis Information:  [email or web address]      Plan:  Treatment as planned    Follow Up:  3 weeks    Recent Lab Results:  Hospital Outpatient Visit on 08/31/2022   Component Date Value Ref Range Status    WBC 08/31/2022 7.4  4.3 - 11.1 K/uL Final    RBC 08/31/2022 3.49 (A) 4.05 - 5.2 M/uL Final    Hemoglobin 08/31/2022 11.4 (A) 11.7 - 15.4 g/dL Final    Hematocrit 08/31/2022 35.1 (A) 35.8 - 46.3 % Final    MCV 08/31/2022 100.6 (A) 79.6 - 97.8 FL Final    MCH 08/31/2022 32.7  26.1 - 32.9 PG Final    MCHC 08/31/2022 32.5  31.4 - 35.0 g/dL Final    RDW 08/31/2022 15.9 (A) 11.9 - 14.6 % Final    Platelets 28/33/3627 212  150 - 450 K/uL Final    MPV 08/31/2022 8.8 (A) 9.4 - 12.3 FL Final    nRBC 08/31/2022 0.00  0.0 - 0.2 K/uL Final    **Note: Absolute NRBC parameter is now reported with Hemogram**    Seg Neutrophils 08/31/2022 58  43 - 78 % Final    Lymphocytes 08/31/2022 29  13 - 44 % Final    Monocytes 08/31/2022 11  4.0 - 12.0 % Final    Eosinophils % 08/31/2022 1  0.5 - 7.8 % Final    Basophils 08/31/2022 0  0.0 - 2.0 % Final    Immature Granulocytes 08/31/2022 1  0.0 - 5.0 % Final    Segs Absolute 08/31/2022 4.3  1.7 - 8.2 K/UL Final    Absolute Lymph # 08/31/2022 2.2  0.5 - 4.6 K/UL Final    Absolute Mono # 08/31/2022 0.8  0.1 - 1.3 K/UL Final    Absolute Eos # 08/31/2022 0.1  0.0 - 0.8 K/UL Final    Basophils Absolute 08/31/2022 0.0  0.0 - 0.2 K/UL Final    Absolute Immature Granulocyte 08/31/2022 0.0  0.0 - 0.5 K/UL Final    Differential Type 08/31/2022 AUTOMATED    Final    Sodium 08/31/2022 135 (A) 136 - 145 mmol/L Final    Potassium 08/31/2022 4.2  3.5 - 5.1 mmol/L Final    Chloride 08/31/2022 103  101 - 110 mmol/L Final    CO2 08/31/2022 26  21 - 32 mmol/L Final    Anion Gap 08/31/2022 6  4 - 13 mmol/L Final    Glucose 08/31/2022 101 (A) 65 - 100 mg/dL Final    BUN 08/31/2022 28 (A) 8 - 23 MG/DL Final    Creatinine 08/31/2022 1.50 (A) 0.6 - 1.0 MG/DL Final    GFR  08/31/2022 42 (A) >60 ml/min/1.73m2 Final    GFR Non- 08/31/2022 35 (A) >60 ml/min/1.73m2 Final    Comment:      Estimated GFR is calculated using the Modification of Diet in Renal Disease (MDRD) Study equation, reported for both  Americans (GFRAA) and non- Americans (GFRNA), and normalized to 1.73m2 body surface area. The physician must decide which value applies to the patient. The MDRD study equation should only be used in individuals age 25 or older. It has not been validated for the following: pregnant women, patients with serious comorbid conditions,or on certain medications, or persons with extremes of body size, muscle mass, or nutritional status. Calcium 08/31/2022 9.6  8.3 - 10.4 MG/DL Final    Total Bilirubin 08/31/2022 0.4  0.2 - 1.1 MG/DL Final    ALT 08/31/2022 23  12 - 65 U/L Final    AST 08/31/2022 16  15 - 37 U/L Final    Alk Phosphatase 08/31/2022 117  50 - 136 U/L Final    Total Protein 08/31/2022 8.1  6.3 - 8.2 g/dL Final    Albumin 08/31/2022 3.4  3.2 - 4.6 g/dL Final    Globulin 08/31/2022 4.7 (A) 2.3 - 3.5 g/dL Final    Albumin/Globulin Ratio 08/31/2022 0.7 (A) 1.2 - 3.5   Final    Magnesium 08/31/2022 2.4  1.8 - 2.4 mg/dL Final     08/31/2022 34  1.5 - 35.0 U/mL Final    [employer]. Patient's results of tumor marker testing may not be comparable to labs using different manufacturers/methods. Treatment Summary has been discussed and given to patient: n/a        -------------------------------------------------------------------------------------------------------------------    Patient does express an interest in My Chart. My Chart log in information explained on the after visit summary printout at the [facility] desk.     Vannessa Freeman, RN, MSN, OCN  Gynecology Nurse Navigator for  [address]  Phone:  [phone]  Fax: [phone]  Email: [email or web address]

## 2022-09-15 DIAGNOSIS — C56.9 OVARY CANCER, UNSPECIFIED LATERALITY (HCC): Primary | ICD-10-CM

## 2022-09-19 ASSESSMENT — ENCOUNTER SYMPTOMS
GASTROINTESTINAL NEGATIVE: 1
RESPIRATORY NEGATIVE: 1
ALLERGIC/IMMUNOLOGIC NEGATIVE: 1
EYES NEGATIVE: 1

## 2022-09-19 NOTE — PROGRESS NOTES
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 who presents today with a family member for pre-chemo exam.     Elevated CA-125 at dx. Possible recent hx GI perf/diverticulitis?, imaging 3/2022. Only sx was rectal bleeding, resolved. 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 Mely Rice, on nocturnal home  O2), NIDDM, htn, OA, right ankle surgery after fracture, hospitalization 2/8/22 for Covid-19 infection, UTI, dehydration. More recently seen by them and underwent PET scan after a more recent CT abd (reportedly done for abdominal discomfort and blood per rectum) reportedly had shown abdominal mass. PET scan revealed hypermetabolic multiple abdominal masses as well as right  cardiophrenic mass concerning for malignancy including lymphoma. A hypermetabolic 4.1 cm fluid and gas containing collection located at rectosigmoid junction and uterine fundus concerning for possible abscess also noted. A mildly hypermetabolic consolidative  appearing opacities in the right greater than left lung most consistent with subacute infection/inflammatory process also noted. Denies any recent fevers or drenching night sweats, appears non-toxic. 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 She has no GI//PULM/CV/Neuro complaints today. She sates she is tolerating chemotherapy well. Pain is controlled with tramadol.      Past Medical History     Past Medical History:   Diagnosis Date    COPD (chronic obstructive pulmonary disease) ([facility])     Depression     Diabetes ([facility] [address]     Hypertension         Past Surgical History     Past Surgical History:   Procedure Laterality Date    ANKLE FRACTURE SURGERY      ankle broken    IR PORT PLACEMENT EQUAL OR GREATER THAN 5 YEARS  5/11/2022    IR PORT PLACEMENT EQUAL OR GREATER THAN 5 YEARS  5/11/2022    IR PORT PLACEMENT EQUAL OR GREATER THAN 5 YEARS 5/11/2022 [facility]    US ABDOMINAL MASS BIOPSY PERCUTANEOUS  4/28/2022    US ABDOMINAL MASS BIOPSY PERCUTANEOUS 4/28/2022 [facility] US        Medications      Current Outpatient Medications:     Niraparib Tosylate (ZEJULA) 100 MG CAPS, Take 200 mg by mouth daily, Disp: 60 capsule, Rfl: 11    prochlorperazine (COMPAZINE) 5 MG tablet, TAKE 1 TABLET BY MOUTH EVERY 6 HOURS AS NEEDED FOR NAUSEA OR VOMITING, Disp: , Rfl:     ondansetron (ZOFRAN-ODT) 4 MG disintegrating tablet, , Disp: , Rfl:     lidocaine-prilocaine (EMLA) 2.5-2.5 % cream, Apply a dime size topically to port as needed 45 minutes prior to your lab or infusion appointment. , Disp: 5 g, Rfl: 1    albuterol sulfate HFA (PROVENTIL;VENTOLIN;PROAIR) 108 (90 Base) MCG/ACT inhaler, INHALE 2 PUFFS BY MOUTH EVERY 6 HOURS AS NEEDED, Disp: , Rfl:     aspirin 81 MG chewable tablet, Take 81 mg by mouth daily, Disp: , Rfl:     calcium carbonate-vitamin D 600-200 MG-UNIT TABS, Take 2 tablets by mouth daily, Disp: , Rfl:     cyanocobalamin 500 MCG tablet, Take 500 mcg by mouth daily, Disp: , Rfl:     glimepiride (AMARYL) 1 MG tablet, Take 1 mg by mouth daily, Disp: , Rfl:     ibuprofen (ADVIL;MOTRIN) 200 MG tablet, Take 3 tablets by mouth as needed, Disp: , Rfl:     lisinopril-hydroCHLOROthiazide (PRINZIDE;ZESTORETIC) 10-12.5 MG per tablet, Take 1 tablet by mouth daily, Disp: , Rfl:     LORazepam (ATIVAN) 0.5 MG tablet, Take 0.5 mg by mouth every 8 hours as needed.  , Disp: , Rfl:     umeclidinium-vilanterol (ANORO ELLIPTA) Nose: No congestion. Cardiovascular:      Rate and Rhythm: Normal rate and regular rhythm. Pulses: Normal pulses. Heart sounds: Normal heart sounds. Pulmonary:      Effort: Pulmonary effort is normal. No respiratory distress. Breath sounds: Normal breath sounds. Abdominal:      General: Abdomen is flat. There is no distension. Palpations: Abdomen is soft. There is no mass. Musculoskeletal:      Cervical back: Normal range of motion and neck supple. Skin:     General: Skin is warm and dry. Neurological:      General: No focal deficit present. Mental Status: She is alert and oriented to person, place, and time. Psychiatric:         Mood and Affect: Mood normal.         Behavior: Behavior normal.         Thought Content:  Thought content normal.         Judgment: Judgment normal.       Labs        Recent Results (from the past 48 hour(s))   CBC with Auto Differential    Collection Time: 09/21/22  9:43 AM   Result Value Ref Range    WBC 6.1 4.3 - 11.1 K/uL    RBC 3.39 (L) 4.05 - 5.2 M/uL    Hemoglobin 11.3 (L) 11.7 - 15.4 g/dL    Hematocrit 35.1 (L) 35.8 - 46.3 %    .5 (H) 79.6 - 97.8 FL    MCH 33.3 (H) 26.1 - 32.9 PG    MCHC 32.2 31.4 - 35.0 g/dL    RDW 14.8 (H) 11.9 - 14.6 %    Platelets 670 851 - 531 K/uL    MPV 8.9 (L) 9.4 - 12.3 FL    nRBC 0.00 0.0 - 0.2 K/uL    Seg Neutrophils 54 43 - 78 %    Lymphocytes 32 13 - 44 %    Monocytes 12 4.0 - 12.0 %    Eosinophils % 1 0.5 - 7.8 %    Basophils 0 0.0 - 2.0 %    Immature Granulocytes 1 0.0 - 5.0 %    Segs Absolute 3.3 1.7 - 8.2 K/UL    Absolute Lymph # 2.0 0.5 - 4.6 K/UL    Absolute Mono # 0.7 0.1 - 1.3 K/UL    Absolute Eos # 0.1 0.0 - 0.8 K/UL    Basophils Absolute 0.0 0.0 - 0.2 K/UL    Absolute Immature Granulocyte 0.0 0.0 - 0.5 K/UL    Differential Type AUTOMATED     Comprehensive Metabolic Panel    Collection Time: 09/21/22  9:43 AM   Result Value Ref Range    Sodium 139 136 - 145 mmol/L    Potassium 4.5 3.5 - 5.1 mmol/L into this report. STF-IMMUNOHISTOCHEMISTRY                                                                                   Status:  Reviewed By:    Jonny Kelley. 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 Dr. Jaxon Nevarez concurs. Antibody/Test               Marker For                              Result   Keratin AE1/AE3             Broad spectrum epithelial marker                  Positive   Cytokeratin 7               Lung, breast, upper GE, serous ovary                  Positive   Cytokeratin 20               Colon, mucinous ovary, urothelium                  Negative   CDX2                    Gastrointestinal carcinomas                       Negative   GATA3               Urothelial, breast carcinoma                       Negative   Calretinin                Mesothelioma                                 Negative   ER                    Estrogen receptor                          Positive   Napsin                     Lung adenocarcinoma                           Negative   [place]-8                    Renal cell carcinoma                           Positive   TTF-1                     Lung and thyroid adenocarcinoma                 Negative   WT-1                    Serous carcinoma, mesothelioma                 Positive     Imaging/Diagnostics Last 24 Hours   No results found. Radiology:    CT Result (most recent):  CT CHEST ABDOMEN PELVIS W CONTRAST 06/27/2022    Narrative  CT CHEST, ABDOMEN AND PELVIS WITH INTRAVENOUS CONTRAST DATED 6/27/2022. History: Follow-up mixed mullerian tumor.     Comparison: PET scan 3/29/2022    Technique:   Multiple contiguous helical CT images reconstructed at 5 mm were  obtained from the base of the neck to the ischial tuberosities following oral  and 100 cc Isovue-370 without acute complication. All CT scans performed at  this facility use one or all of the following: Automated exposure control,  adjustment of the mA and/or kVp according to patient's size, iterative  reconstruction. Findings:  CT Chest:  The base of the neck is unremarkable in appearance. No evolving axillary,  mediastinal, or hilar lymphadenopathy is seen. The prior hypermetabolic  epicardial nodule likely representing an epicardial lymph node has decreased in  size now seen on image 44 measuring 9 mm and previously measured 16 mm when  measured using a similar technique. An additional hypermetabolic nodule is seen  in the right lateral chest wall at the ninth intercostal space. Nodular density  at this level has not clearly changed best appreciated on axial image 55. The  thoracic aorta is normal in caliber. Evaluation with lung windows demonstrates no new suspicious pulmonary lesion. Only improving inflammatory appearing changes are seen in the right lower lobe  with only mild residual likely scarring now seen on axial image 28. No pleural  effusion is seen. Lungs are expanded without evidence for pneumothorax. No  evolving aggressive osseous lesion is seen. CT ABDOMEN:  The Liver is homogeneous in attenuation. The spleen is homogeneous in  attenuation. No contour deforming or enhancing mass lesions are seen of the  pancreas or adrenal glands. The gallbladder infiltrates multiple faint stones  without acute inflammatory changes. The kidneys enhance symmetrically and no  evidence of hydronephrosis is seen. The visualized loops of small bowel and colon are normal in caliber. No free  fluid and no free air is seen in the abdomen. No evolving adenopathy is seen of  the abdomen. Rather, some improvement is seen in all prior hypermetabolic  mesenteric masses when remeasured using similar technique.  The prior 3.2 cm left  periaortic soft tissue mass is now seen on image 60 measuring 2.3 cm, the prior  3.7 cm mass adjacent to the pancreatic head is now seen on axial image 16  measuring 1.9 cm, the prior 2.6 cm left para-aortic mass is now seen on axial  image 60 measuring 2.3 cm, the prior 3.5 cm left anterior peritoneal mass is now  seen on axial image 72 measuring 3.1 cm, the prior 2.8 cm central left abdominal  mesenteric mass is now seen on axial image 73 measuring 1.7 cm, and the prior  8.9 cm x 5.1 cm central right lower quadrant mass is now seen on axial image 88  measuring 5.5 cm x 4.3 cm. The abdominal aorta demonstrates advanced  atherosclerotic calcification. No evolving aggressive osseous lesion is seen. CT PELVIS:  No abnormal pelvic fluid collections are present. No evolving pelvic adenopathy  is seen. Improving hypermetabolic masses are seen in the right pelvis. The prior  6.4 cm x 5 cm mass now measures 3.9 cm x 2.3 cm. Hypermetabolic tumor extends to  the uterus. Prior abnormal gas within the endometrium persist although this does  appear improved. The urinary bladder is unremarkable. No evolving aggressive  osseous lesion is seen. Impression  1. Improving chest soft tissue, mesenteric and pelvic masses as described  above. Only one small right lateral chest wall soft tissue nodule shows no  significant interval change. In addition, improving pulmonary parenchymal  changes are seen in the right lower lobe although the prior appearance favors a  benign inflammatory process. No findings concerning for disease progression are  seen at any level. This report was made using voice transcription. Despite my best efforts to avoid  any, transcription errors may persist. If there is any question about the  accuracy of the report or need for clarification, then please call [phone], or text me through [employer] for clarification or correction.      PET Results (most recent):  PET CT SKULL BASE TO MID THIGH 03/29/2022    Narrative  PET/CT    Indication: Abdominal mass on outside CT    Radiopharmaceutical: 14.4 mCi F18-FDG, intravenously. Technique: Imaging was performed from the skull through the proximal thighs  using routine PET/CT acquisition protocol. Imaging was performed approximately  60 minutes post injection. Oral contrast was administered. Radiation dose  reduction techniques were used for this study:  Our CT scanners use one or all  of the following: Automated exposure control, adjustment of the mA and/or kVp  according to patient's size, iterative reconstruction. Serum glucose: 93 mg/dL prior to injection. Comparison studies: None available. Findings:    Head and Neck: No enlarged or hypermetabolic lymph nodes within the neck. Chest: A lymph node in the right cardiophrenic fat is hypermetabolic, measuring  1.6 cm in maximum SUV of 10.9. Hypermetabolic nodular and slightly consolidative appearing opacities in the  posterior right upper and lower lobes and to a lesser extent the left lower  lobe. Abdomen/Pelvis: Hypermetabolic solid masses throughout the abdomen index as  follows:  Peripancreatic/periportal mass measuring 3.7 cm, maximum SUV 20.3.  3.2 cm mass medially to the gastric fundus, maximum SUV 13.2  Left para-aortic mass measuring 2.6 cm, maximum SUV 15.6. Omental mass measuring 3.5 cm, maximum SUV 13.3. Left mesenteric mass measuring 2.8 cm, maximum SUV 16.7. Right lower quadrant mass measuring 8.9 x 5.1 cm, maximum SUV 19.5. Right pelvic sidewall mass measuring 6.4 x 5.0 cm, axillary SUV 26. Hypermetabolic gas and fluid containing collection measuring 4.1 cm adjacent to  the sigmoid colon contacting the uterine fundus. Impression  1. Hypermetabolic right cardiophrenic and abdominal masses as above concerning  for malignancy such as lymphoma. 2.  Hypermetabolic 4.1 cm fluid and gas-containing collection located between  the rectosigmoid junction and uterine fundus concerning for a possible abscess  or pyomyoma.  Recommend correlation for any evidence of infection. 3.  Mildly hypermetabolic nodular and slightly consolidative appearing opacities  in the right greater than left lungs appearance most consistent with a subacute  infectious/inflammatory process. The study was referred to the imaging navigator to expedite delivery of results. 12    Mammo Results (most recent):  No results found for this or any previous visit from the past 365 days. US Result (most recent):  US ABDOMINAL MASS BIOPSY PERCUTANEOUS 04/28/2022    Narrative  History: Abdominal masses    Consent: Informed written and oral consent was obtained from the patient after  explanation of benefits and risks (including, but not limited to: Infection,  Hemorrhage, and Visceral injury) of procedure. The patient's questions were  answered and requested that we proceed. Procedure:  Sterile barrier technique (including:  cap, mask, sterile gloves,  sterile sheet, hand hygiene, and chlorhexidene for cutaneous antisepsis) were  used. Following routine prep and drape of the upper abdomen, a local field  block with 1% lidocaine was achieved. Using real-time ultrasound guidance, a 17  gauge needle was advanced into the superficial aspect of the left upper quadrant  abdominal mass. Through this base needle, 4 18 gauge core biopsies were  performed. The postprocedure ultrasound demonstrates no evidence of hematoma. Complications: None. Medications:  Under physician supervision, intraservice time of 17 minutes of  moderate intravenous conscious sedation was performed by administering Versed,  Fentanyl, intravenously. Continuous pulse oximetry, heart rate, and blood  pressure monitoring was performed with an independent, trained observer present. Impression  Technically successful ultrasound guided left upper quadrant  abdominal mass biopsy. The gas and fluid filled focus in the pelvis is not  amenable to percutaneous drainage or biopsy.     Specimen: Sent to cytopathology. Plan: The patient will recover for approximately 1 hour         Assessment       Diagnosis Orders   1. Ovary cancer, unspecified laterality ([facility] [address]  CT CHEST ABDOMEN PELVIS W CONTRAST      2.  Encounter for antineoplastic chemotherapy          Specialty Problems          Oncology Problems    Malignant neoplasm of ovary (HCC)                 Pt tolerating chemo therapy well   CA-125 continued to improve  Labs reviewed    Plan   Proceed with cycle 7carbo-plan on 7total cycles, ca 125 still normalizing  Precautions reviewed  Con't gcsf  Plan parp maintancem, primary non brca, plat hazel arellano pt, will fu after imaging, not to start med if arrives at home                Patsy Chapa MD, [facility]  [address] [address]  Office : [phone]  Fax : [phone]

## 2022-09-21 ENCOUNTER — HOSPITAL ENCOUNTER (OUTPATIENT)
Dept: INFUSION THERAPY | Age: 87
Discharge: HOME | End: 2022-09-21
Payer: MEDICARE

## 2022-09-21 ENCOUNTER — OFFICE VISIT (OUTPATIENT)
Dept: ONCOLOGY | Age: 87
End: 2022-09-21
Payer: MEDICARE

## 2022-09-21 VITALS
WEIGHT: 183.3 LBS | HEART RATE: 88 BPM | OXYGEN SATURATION: 97 % | BODY MASS INDEX: 32.48 KG/M2 | DIASTOLIC BLOOD PRESSURE: 56 MMHG | SYSTOLIC BLOOD PRESSURE: 110 MMHG | HEIGHT: 63 IN | TEMPERATURE: 98.3 F | RESPIRATION RATE: 16 BRPM

## 2022-09-21 DIAGNOSIS — Z51.11 ENCOUNTER FOR ANTINEOPLASTIC CHEMOTHERAPY: ICD-10-CM

## 2022-09-21 DIAGNOSIS — C56.9 OVARY CANCER, UNSPECIFIED LATERALITY (HCC): ICD-10-CM

## 2022-09-21 DIAGNOSIS — C54.9 MIXED MULLERIAN TUMOR (HCC): Primary | ICD-10-CM

## 2022-09-21 DIAGNOSIS — C56.9 OVARY CANCER, UNSPECIFIED LATERALITY (HCC): Primary | ICD-10-CM

## 2022-09-21 LAB
ALBUMIN SERPL-MCNC: 3.5 G/DL (ref 3.2–4.6)
ALBUMIN/GLOB SERPL: 0.8 {RATIO} (ref 1.2–3.5)
ALP SERPL-CCNC: 112 U/L (ref 50–136)
ALT SERPL-CCNC: 27 U/L (ref 12–65)
ANION GAP SERPL CALC-SCNC: 5 MMOL/L (ref 4–13)
AST SERPL-CCNC: 18 U/L (ref 15–37)
BASOPHILS # BLD: 0 K/UL (ref 0–0.2)
BASOPHILS NFR BLD: 0 % (ref 0–2)
BILIRUB SERPL-MCNC: 0.4 MG/DL (ref 0.2–1.1)
BUN SERPL-MCNC: 33 MG/DL (ref 8–23)
CALCIUM SERPL-MCNC: 9.1 MG/DL (ref 8.3–10.4)
CANCER AG125 SERPL-ACNC: 30 U/ML (ref 1.5–35)
CHLORIDE SERPL-SCNC: 107 MMOL/L (ref 101–110)
CO2 SERPL-SCNC: 27 MMOL/L (ref 21–32)
CREAT SERPL-MCNC: 1.6 MG/DL (ref 0.6–1)
DIFFERENTIAL METHOD BLD: ABNORMAL
EOSINOPHIL # BLD: 0.1 K/UL (ref 0–0.8)
EOSINOPHIL NFR BLD: 1 % (ref 0.5–7.8)
ERYTHROCYTE [DISTWIDTH] IN BLOOD BY AUTOMATED COUNT: 14.8 % (ref 11.9–14.6)
GLOBULIN SER CALC-MCNC: 4.6 G/DL (ref 2.3–3.5)
GLUCOSE SERPL-MCNC: 96 MG/DL (ref 65–100)
HCT VFR BLD AUTO: 35.1 % (ref 35.8–46.3)
HGB BLD-MCNC: 11.3 G/DL (ref 11.7–15.4)
IMM GRANULOCYTES # BLD AUTO: 0 K/UL (ref 0–0.5)
IMM GRANULOCYTES NFR BLD AUTO: 1 % (ref 0–5)
LYMPHOCYTES # BLD: 2 K/UL (ref 0.5–4.6)
LYMPHOCYTES NFR BLD: 32 % (ref 13–44)
MAGNESIUM SERPL-MCNC: 2.2 MG/DL (ref 1.8–2.4)
MCH RBC QN AUTO: 33.3 PG (ref 26.1–32.9)
MCHC RBC AUTO-ENTMCNC: 32.2 G/DL (ref 31.4–35)
MCV RBC AUTO: 103.5 FL (ref 79.6–97.8)
MONOCYTES # BLD: 0.7 K/UL (ref 0.1–1.3)
MONOCYTES NFR BLD: 12 % (ref 4–12)
NEUTS SEG # BLD: 3.3 K/UL (ref 1.7–8.2)
NEUTS SEG NFR BLD: 54 % (ref 43–78)
NRBC # BLD: 0 K/UL (ref 0–0.2)
PLATELET # BLD AUTO: 188 K/UL (ref 150–450)
PMV BLD AUTO: 8.9 FL (ref 9.4–12.3)
POTASSIUM SERPL-SCNC: 4.5 MMOL/L (ref 3.5–5.1)
PROT SERPL-MCNC: 8.1 G/DL (ref 6.3–8.2)
RBC # BLD AUTO: 3.39 M/UL (ref 4.05–5.2)
SODIUM SERPL-SCNC: 139 MMOL/L (ref 136–145)
WBC # BLD AUTO: 6.1 K/UL (ref 4.3–11.1)

## 2022-09-21 PROCEDURE — 1123F ACP DISCUSS/DSCN MKR DOCD: CPT | Performed by: OBSTETRICS & GYNECOLOGY

## 2022-09-21 PROCEDURE — 83735 ASSAY OF MAGNESIUM: CPT

## 2022-09-21 PROCEDURE — 6360000002 HC RX W HCPCS: Performed by: OBSTETRICS & GYNECOLOGY

## 2022-09-21 PROCEDURE — 86304 IMMUNOASSAY TUMOR CA 125: CPT

## 2022-09-21 PROCEDURE — 96375 TX/PRO/DX INJ NEW DRUG ADDON: CPT

## 2022-09-21 PROCEDURE — 96367 TX/PROPH/DG ADDL SEQ IV INF: CPT

## 2022-09-21 PROCEDURE — 96413 CHEMO IV INFUSION 1 HR: CPT

## 2022-09-21 PROCEDURE — 80053 COMPREHEN METABOLIC PANEL: CPT

## 2022-09-21 PROCEDURE — 1090F PRES/ABSN URINE INCON ASSESS: CPT | Performed by: OBSTETRICS & GYNECOLOGY

## 2022-09-21 PROCEDURE — 2580000003 HC RX 258: Performed by: OBSTETRICS & GYNECOLOGY

## 2022-09-21 PROCEDURE — G8400 PT W/DXA NO RESULTS DOC: HCPCS | Performed by: OBSTETRICS & GYNECOLOGY

## 2022-09-21 PROCEDURE — 36591 DRAW BLOOD OFF VENOUS DEVICE: CPT

## 2022-09-21 PROCEDURE — 99214 OFFICE O/P EST MOD 30 MIN: CPT | Performed by: OBSTETRICS & GYNECOLOGY

## 2022-09-21 PROCEDURE — 1036F TOBACCO NON-USER: CPT | Performed by: OBSTETRICS & GYNECOLOGY

## 2022-09-21 PROCEDURE — G8427 DOCREV CUR MEDS BY ELIG CLIN: HCPCS | Performed by: OBSTETRICS & GYNECOLOGY

## 2022-09-21 PROCEDURE — G8417 CALC BMI ABV UP PARAM F/U: HCPCS | Performed by: OBSTETRICS & GYNECOLOGY

## 2022-09-21 PROCEDURE — 85025 COMPLETE CBC W/AUTO DIFF WBC: CPT

## 2022-09-21 RX ORDER — ONDANSETRON 2 MG/ML
8 INJECTION INTRAMUSCULAR; INTRAVENOUS ONCE
Status: COMPLETED | OUTPATIENT
Start: 2022-09-21 | End: 2022-09-21

## 2022-09-21 RX ORDER — SODIUM CHLORIDE 9 MG/ML
5-40 INJECTION INTRAVENOUS PRN
Status: DISCONTINUED | OUTPATIENT
Start: 2022-09-21 | End: 2022-09-22 | Stop reason: HOSPADM

## 2022-09-21 RX ORDER — HEPARIN SODIUM (PORCINE) LOCK FLUSH IV SOLN 100 UNIT/ML 100 UNIT/ML
500 SOLUTION INTRAVENOUS PRN
Status: CANCELLED | OUTPATIENT
Start: 2022-09-21

## 2022-09-21 RX ORDER — ONDANSETRON 2 MG/ML
8 INJECTION INTRAMUSCULAR; INTRAVENOUS ONCE
Status: CANCELLED | OUTPATIENT
Start: 2022-09-21 | End: 2022-09-21

## 2022-09-21 RX ORDER — NIRAPARIB 100 MG/1
200 CAPSULE ORAL DAILY
Qty: 60 CAPSULE | Refills: 11 | Status: SHIPPED | OUTPATIENT
Start: 2022-09-21

## 2022-09-21 RX ORDER — SODIUM CHLORIDE 9 MG/ML
5-40 INJECTION INTRAVENOUS PRN
Status: CANCELLED | OUTPATIENT
Start: 2022-09-21

## 2022-09-21 RX ORDER — SODIUM CHLORIDE 9 MG/ML
INJECTION, SOLUTION INTRAVENOUS CONTINUOUS
Status: DISCONTINUED | OUTPATIENT
Start: 2022-09-21 | End: 2022-09-22 | Stop reason: HOSPADM

## 2022-09-21 RX ORDER — ONDANSETRON 2 MG/ML
8 INJECTION INTRAMUSCULAR; INTRAVENOUS
Status: ACTIVE | OUTPATIENT
Start: 2022-09-21 | End: 2022-09-21

## 2022-09-21 RX ORDER — SODIUM CHLORIDE 9 MG/ML
5-250 INJECTION, SOLUTION INTRAVENOUS PRN
Status: CANCELLED | OUTPATIENT
Start: 2022-09-21

## 2022-09-21 RX ORDER — ACETAMINOPHEN 325 MG/1
650 TABLET ORAL
Status: ACTIVE | OUTPATIENT
Start: 2022-09-21 | End: 2022-09-21

## 2022-09-21 RX ORDER — SODIUM CHLORIDE 0.9 % (FLUSH) 0.9 %
5-40 SYRINGE (ML) INJECTION PRN
Status: CANCELLED | OUTPATIENT
Start: 2022-09-21

## 2022-09-21 RX ORDER — DIPHENHYDRAMINE HYDROCHLORIDE 50 MG/ML
50 INJECTION INTRAMUSCULAR; INTRAVENOUS
Status: CANCELLED | OUTPATIENT
Start: 2022-09-21

## 2022-09-21 RX ORDER — SODIUM CHLORIDE 9 MG/ML
INJECTION, SOLUTION INTRAVENOUS CONTINUOUS
Status: CANCELLED | OUTPATIENT
Start: 2022-09-21

## 2022-09-21 RX ORDER — ALBUTEROL SULFATE 90 UG/1
4 AEROSOL, METERED RESPIRATORY (INHALATION) PRN
Status: DISCONTINUED | OUTPATIENT
Start: 2022-09-21 | End: 2022-09-22 | Stop reason: HOSPADM

## 2022-09-21 RX ORDER — DIPHENHYDRAMINE HYDROCHLORIDE 50 MG/ML
50 INJECTION INTRAMUSCULAR; INTRAVENOUS
Status: ACTIVE | OUTPATIENT
Start: 2022-09-21 | End: 2022-09-21

## 2022-09-21 RX ORDER — SODIUM CHLORIDE 0.9 % (FLUSH) 0.9 %
10 SYRINGE (ML) INJECTION PRN
Status: DISCONTINUED | OUTPATIENT
Start: 2022-09-21 | End: 2022-09-22 | Stop reason: HOSPADM

## 2022-09-21 RX ORDER — ACETAMINOPHEN 325 MG/1
650 TABLET ORAL
Status: CANCELLED | OUTPATIENT
Start: 2022-09-21

## 2022-09-21 RX ORDER — HEPARIN SODIUM (PORCINE) LOCK FLUSH IV SOLN 100 UNIT/ML 100 UNIT/ML
500 SOLUTION INTRAVENOUS PRN
Status: DISCONTINUED | OUTPATIENT
Start: 2022-09-21 | End: 2022-09-22 | Stop reason: HOSPADM

## 2022-09-21 RX ORDER — SODIUM CHLORIDE 9 MG/ML
5-250 INJECTION, SOLUTION INTRAVENOUS PRN
Status: DISCONTINUED | OUTPATIENT
Start: 2022-09-21 | End: 2022-09-22 | Stop reason: HOSPADM

## 2022-09-21 RX ORDER — EPINEPHRINE 1 MG/ML
0.3 INJECTION, SOLUTION, CONCENTRATE INTRAVENOUS PRN
Status: DISCONTINUED | OUTPATIENT
Start: 2022-09-21 | End: 2022-09-22 | Stop reason: HOSPADM

## 2022-09-21 RX ORDER — FAMOTIDINE 10 MG/ML
20 INJECTION, SOLUTION INTRAVENOUS
Status: CANCELLED | OUTPATIENT
Start: 2022-09-21

## 2022-09-21 RX ORDER — ALBUTEROL SULFATE 90 UG/1
4 AEROSOL, METERED RESPIRATORY (INHALATION) PRN
Status: CANCELLED | OUTPATIENT
Start: 2022-09-21

## 2022-09-21 RX ORDER — MEPERIDINE HYDROCHLORIDE 50 MG/ML
12.5 INJECTION INTRAMUSCULAR; INTRAVENOUS; SUBCUTANEOUS PRN
Status: CANCELLED | OUTPATIENT
Start: 2022-09-21

## 2022-09-21 RX ORDER — SODIUM CHLORIDE 0.9 % (FLUSH) 0.9 %
5-40 SYRINGE (ML) INJECTION PRN
Status: DISCONTINUED | OUTPATIENT
Start: 2022-09-21 | End: 2022-09-22 | Stop reason: HOSPADM

## 2022-09-21 RX ORDER — EPINEPHRINE 1 MG/ML
0.3 INJECTION, SOLUTION, CONCENTRATE INTRAVENOUS PRN
Status: CANCELLED | OUTPATIENT
Start: 2022-09-21

## 2022-09-21 RX ORDER — ONDANSETRON 2 MG/ML
8 INJECTION INTRAMUSCULAR; INTRAVENOUS
Status: CANCELLED | OUTPATIENT
Start: 2022-09-21

## 2022-09-21 RX ADMIN — FOSAPREPITANT DIMEGLUMINE 150 MG: 150 INJECTION, POWDER, LYOPHILIZED, FOR SOLUTION INTRAVENOUS at 11:24

## 2022-09-21 RX ADMIN — CARBOPLATIN 255 MG: 10 INJECTION, SOLUTION INTRAVENOUS at 12:21

## 2022-09-21 RX ADMIN — ONDANSETRON 8 MG: 2 INJECTION INTRAMUSCULAR; INTRAVENOUS at 12:00

## 2022-09-21 RX ADMIN — DEXAMETHASONE SODIUM PHOSPHATE 12 MG: 4 INJECTION, SOLUTION INTRAMUSCULAR; INTRAVENOUS at 11:45

## 2022-09-21 RX ADMIN — SODIUM CHLORIDE: 9 INJECTION, SOLUTION INTRAVENOUS at 11:15

## 2022-09-21 RX ADMIN — SODIUM CHLORIDE, PRESERVATIVE FREE 10 ML: 5 INJECTION INTRAVENOUS at 09:54

## 2022-09-21 ASSESSMENT — PATIENT HEALTH QUESTIONNAIRE - PHQ9
1. LITTLE INTEREST OR PLEASURE IN DOING THINGS: 0
2. FEELING DOWN, DEPRESSED OR HOPELESS: 0
SUM OF ALL RESPONSES TO PHQ QUESTIONS 1-9: 0
SUM OF ALL RESPONSES TO PHQ9 QUESTIONS 1 & 2: 0
SUM OF ALL RESPONSES TO PHQ QUESTIONS 1-9: 0

## 2022-09-21 NOTE — PATIENT INSTRUCTIONS
Patient Instructions from Today's Visit    Reason for Visit:  prechemo        Plan:  Last treatment today then we will schedule scan   We will also send in a prescription for parp inhibiter for you to start since we are done with the chemotherapy     Follow Up:  4 week with scan before     Recent Lab Results:  Hospital Outpatient Visit on 09/21/2022   Component Date Value Ref Range Status    WBC 09/21/2022 6.1  4.3 - 11.1 K/uL Final    RBC 09/21/2022 3.39 (A) 4.05 - 5.2 M/uL Final    Hemoglobin 09/21/2022 11.3 (A) 11.7 - 15.4 g/dL Final    Hematocrit 09/21/2022 35.1 (A) 35.8 - 46.3 % Final    MCV 09/21/2022 103.5 (A) 79.6 - 97.8 FL Final    MCH 09/21/2022 33.3 (A) 26.1 - 32.9 PG Final    MCHC 09/21/2022 32.2  31.4 - 35.0 g/dL Final    RDW 09/21/2022 14.8 (A) 11.9 - 14.6 % Final    Platelets 53/14/7779 188  150 - 450 K/uL Final    MPV 09/21/2022 8.9 (A) 9.4 - 12.3 FL Final    nRBC 09/21/2022 0.00  0.0 - 0.2 K/uL Final    **Note: Absolute NRBC parameter is now reported with Hemogram**    Seg Neutrophils 09/21/2022 54  43 - 78 % Final    Lymphocytes 09/21/2022 32  13 - 44 % Final    Monocytes 09/21/2022 12  4.0 - 12.0 % Final    Eosinophils % 09/21/2022 1  0.5 - 7.8 % Final    Basophils 09/21/2022 0  0.0 - 2.0 % Final    Immature Granulocytes 09/21/2022 1  0.0 - 5.0 % Final    Segs Absolute 09/21/2022 3.3  1.7 - 8.2 K/UL Final    Absolute Lymph # 09/21/2022 2.0  0.5 - 4.6 K/UL Final    Absolute Mono # 09/21/2022 0.7  0.1 - 1.3 K/UL Final    Absolute Eos # 09/21/2022 0.1  0.0 - 0.8 K/UL Final    Basophils Absolute 09/21/2022 0.0  0.0 - 0.2 K/UL Final    Absolute Immature Granulocyte 09/21/2022 0.0  0.0 - 0.5 K/UL Final    Differential Type 09/21/2022 AUTOMATED    Final                -------------------------------------------------------------------------------------------------------------------      After office hours an answering service is available and will contact a provider for emergencies or if you are

## 2022-09-21 NOTE — PROGRESS NOTES
Arrived to the [facility]. Carboplatin infusion completed. Patient tolerated well. Any issues or concerns during appointment: No.  Discharged home in wc with family in stable condition.

## 2022-09-21 NOTE — PROGRESS NOTES
Patient arrived to port lab ambulatory for port access and lab draw. Port accessed and labs drawn per protocol with no complications. Port remains accessed. Patient discharged from port lab ambulatory.

## 2022-09-23 RX ORDER — NIRAPARIB 100 MG/1
100 CAPSULE ORAL DAILY
Qty: 30 CAPSULE | Refills: 1 | Status: CANCELLED | OUTPATIENT
Start: 2022-09-23

## 2022-09-26 RX ORDER — NIRAPARIB 100 MG/1
100 CAPSULE ORAL DAILY
Qty: 30 CAPSULE | Refills: 1 | Status: SHIPPED | OUTPATIENT
Start: 2022-09-26

## 2022-10-12 NOTE — PROGRESS NOTES
Faxed Together [address] [facility] application to [phone] , awaiting determination.

## 2022-10-18 ENCOUNTER — TELEPHONE (OUTPATIENT)
Dept: ONCOLOGY | Age: 87
End: 2022-10-18

## 2022-10-18 NOTE — TELEPHONE ENCOUNTER
Called asked fot pt, Per Alethea Mccarthy ( care giver ) stated that pt was unavailable at the moment. I made Alethea Mccarthy aware of the free drug and asked to be on out look for phone call from [facility] to discuss shipment of [place].

## 2022-10-21 ENCOUNTER — HOSPITAL ENCOUNTER (OUTPATIENT)
Dept: CT IMAGING | Age: 87
Discharge: HOME | End: 2022-10-24
Payer: MEDICARE

## 2022-10-21 DIAGNOSIS — Z51.11 ENCOUNTER FOR ANTINEOPLASTIC CHEMOTHERAPY: ICD-10-CM

## 2022-10-21 DIAGNOSIS — C56.9 OVARY CANCER, UNSPECIFIED LATERALITY (HCC): ICD-10-CM

## 2022-10-21 LAB — CREAT BLD-MCNC: 1.17 MG/DL (ref 0.8–1.5)

## 2022-10-21 PROCEDURE — 71260 CT THORAX DX C+: CPT

## 2022-10-21 PROCEDURE — 2580000003 HC RX 258: Performed by: OBSTETRICS & GYNECOLOGY

## 2022-10-21 PROCEDURE — 82565 ASSAY OF CREATININE: CPT

## 2022-10-21 PROCEDURE — 6360000004 HC RX CONTRAST MEDICATION: Performed by: OBSTETRICS & GYNECOLOGY

## 2022-10-21 RX ORDER — SODIUM CHLORIDE 0.9 % (FLUSH) 0.9 %
10 SYRINGE (ML) INJECTION
Status: COMPLETED | OUTPATIENT
Start: 2022-10-21 | End: 2022-10-21

## 2022-10-21 RX ORDER — 0.9 % SODIUM CHLORIDE 0.9 %
100 INTRAVENOUS SOLUTION INTRAVENOUS
Status: COMPLETED | OUTPATIENT
Start: 2022-10-21 | End: 2022-10-21

## 2022-10-21 RX ADMIN — IOPAMIDOL 100 ML: 755 INJECTION, SOLUTION INTRAVENOUS at 11:57

## 2022-10-21 RX ADMIN — SODIUM CHLORIDE 100 ML: 9 INJECTION, SOLUTION INTRAVENOUS at 12:00

## 2022-10-21 RX ADMIN — DIATRIZOATE MEGLUMINE AND DIATRIZOATE SODIUM 15 ML: 660; 100 LIQUID ORAL; RECTAL at 11:59

## 2022-10-21 RX ADMIN — SODIUM CHLORIDE, PRESERVATIVE FREE 10 ML: 5 INJECTION INTRAVENOUS at 11:59

## 2022-10-25 ASSESSMENT — ENCOUNTER SYMPTOMS
ALLERGIC/IMMUNOLOGIC NEGATIVE: 1
EYES NEGATIVE: 1
RESPIRATORY NEGATIVE: 1
GASTROINTESTINAL NEGATIVE: 1

## 2022-10-25 NOTE — PROGRESS NOTES
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 who presents today with a family member for pre-chemo exam.     Elevated CA-125 at dx. Possible recent hx GI perf/diverticulitis?, imaging 3/2022. Only sx was rectal bleeding, resolved. 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 Sophia Scott, on nocturnal home  O2), NIDDM, htn, OA, right ankle surgery after fracture, hospitalization 2/8/22 for Covid-19 infection, UTI, dehydration. More recently seen by them and underwent PET scan after a more recent CT abd (reportedly done for abdominal discomfort and blood per rectum) reportedly had shown abdominal mass. PET scan revealed hypermetabolic multiple abdominal masses as well as right  cardiophrenic mass concerning for malignancy including lymphoma. A hypermetabolic 4.1 cm fluid and gas containing collection located at rectosigmoid junction and uterine fundus concerning for possible abscess also noted. A mildly hypermetabolic consolidative  appearing opacities in the right greater than left lung most consistent with subacute infection/inflammatory process also noted. Denies any recent fevers or drenching night sweats, appears non-toxic. 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 She has no GI//PULM/CV/Neuro complaints today. She sates she is tolerating chemotherapy well. Pain is controlled with tramadol. Past Medical History     Past Medical History:   Diagnosis Date    COPD (chronic obstructive pulmonary disease) (HCC)     Depression     Diabetes (HCC)     Hypertension         Past Surgical History     Past Surgical History:   Procedure Laterality Date    ANKLE FRACTURE SURGERY      ankle broken    IR PORT PLACEMENT EQUAL OR GREATER THAN 5 YEARS  5/11/2022    IR PORT PLACEMENT EQUAL OR GREATER THAN 5 YEARS  5/11/2022    IR PORT PLACEMENT EQUAL OR GREATER THAN 5 YEARS 5/11/2022 [facility] SPECI[facility]    US ABDOMINAL MASS BIOPSY PERCUTANEOUS  4/28/2022    US ABDOMINAL MASS BIOPSY PERCUTANEOUS 4/28/2022 [facility] US        Medications      Current Outpatient Medications:     prochlorperazine (COMPAZINE) 5 MG tablet, TAKE 1 TABLET BY MOUTH EVERY 6 HOURS AS NEEDED FOR NAUSEA OR VOMITING, Disp: , Rfl:     ondansetron (ZOFRAN-ODT) 4 MG disintegrating tablet, , Disp: , Rfl:     lidocaine-prilocaine (EMLA) 2.5-2.5 % cream, Apply a dime size topically to port as needed 45 minutes prior to your lab or infusion appointment. , Disp: 5 g, Rfl: 1    albuterol sulfate HFA (PROVENTIL;VENTOLIN;PROAIR) 108 (90 Base) MCG/ACT inhaler, INHALE 2 PUFFS BY MOUTH EVERY 6 HOURS AS NEEDED, Disp: , Rfl:     aspirin 81 MG chewable tablet, Take 81 mg by mouth daily, Disp: , Rfl:     calcium carbonate-vitamin D 600-200 MG-UNIT TABS, Take 2 tablets by mouth daily, Disp: , Rfl:     cyanocobalamin 500 MCG tablet, Take 500 mcg by mouth daily, Disp: , Rfl:     glimepiride (AMARYL) 1 MG tablet, Take 1 mg by mouth daily, Disp: , Rfl:     ibuprofen (ADVIL;MOTRIN) 200 MG tablet, Take 3 tablets by mouth as needed, Disp: , Rfl:     lisinopril-hydroCHLOROthiazide (PRINZIDE;ZESTORETIC) 10-12.5 MG per tablet, Take 1 tablet by mouth daily, Disp: , Rfl:     LORazepam (ATIVAN) 0.5 MG tablet, Take 0.5 mg by mouth every 8 hours as needed.  , Disp: , Rfl:     umeclidinium-vilanterol (ANORO ELLIPTA) 62.5-25 MCG/INH AEPB inhaler, INHALE 1 PUFF BY MOUTH EVERY DAY, Disp: , Rfl:     Niraparib Tosylate (ZEJULA) 100 MG CAPS, Take 100 mg by mouth daily, Disp: 30 capsule, Rfl: 1    Niraparib Tosylate (ZEJULA) 100 MG CAPS, Take 200 mg by mouth daily (Patient not taking: Reported on 10/26/2022), Disp: 60 capsule, Rfl: 11    Omega-3 Fatty Acids (FISH OIL) 1000 MG CAPS, Take 1 capsule by mouth daily (Patient not taking: No sig reported), Disp: , Rfl:      Allergies   Metformin and Penicillins    Social History     Social History       Tobacco History       Smoking Status  Former      Smokeless Tobacco Use  Never              Alcohol History       Alcohol Use Status  Never              Drug Use       Drug Use Status  Never              Sexual Activity       Sexually Active  Not Asked                    Family History     Family History   Problem Relation Age of Onset    Colon Cancer Sister     Cancer Sister         Bladder    Cancer Brother         bladder       Review of Systems   Review of Systems   Constitutional: Negative. Negative for fatigue. HENT: Negative. Eyes: Negative. Respiratory: Negative. Cardiovascular: Negative. Gastrointestinal: Negative. Endocrine: Negative. Genitourinary: Negative. Musculoskeletal:  Positive for arthralgias and myalgias. Skin: Negative. Allergic/Immunologic: Negative. Neurological: Negative. Hematological: Negative. Psychiatric/Behavioral: Negative. All other systems reviewed and are negative. Physical Exam     ECOG PERFORMANCE STATUS - 1  Pain - 8/10. Mild to moderate pain, requiring medication - see MAR     Fatigue - No flowsheet data found. Distress - No flowsheet data found. Blood pressure (!) 151/93, pulse 82, temperature 98.4 °F (36.9 °C), temperature source Oral, resp. rate 22, height 5' 2.5\" (1.588 m), weight 184 lb (83.5 kg), SpO2 97 %. Physical Exam  Vitals reviewed. Exam conducted with a chaperone present. Constitutional:       Appearance: Normal appearance.    HENT: Head: Normocephalic and atraumatic. Nose: No congestion. Cardiovascular:      Rate and Rhythm: Normal rate and regular rhythm. Pulses: Normal pulses. Heart sounds: Normal heart sounds. Pulmonary:      Effort: Pulmonary effort is normal. No respiratory distress. Breath sounds: Normal breath sounds. Abdominal:      General: Abdomen is flat. There is no distension. Palpations: Abdomen is soft. There is no mass. Musculoskeletal:      Cervical back: Normal range of motion and neck supple. Skin:     General: Skin is warm and dry. Neurological:      General: No focal deficit present. Mental Status: She is alert and oriented to person, place, and time. Psychiatric:         Mood and Affect: Mood normal.         Behavior: Behavior normal.         Thought Content: Thought content normal.         Judgment: Judgment normal.       Labs        No results found for this or any previous visit (from the past 48 hour(s)). Lab Results   Component Value Date     30 09/21/2022      Component      Latest Ref Rng & Units 9/21/2022 8/31/2022 8/10/2022 7/20/2022           9:43 AM  8:50 AM  9:29 AM  9:15 AM   ,C125      1.5 - 35.0 U/mL 30 34 56 (H) 163 (H)     Component      Latest Ref Rng & Units 6/29/2022 6/8/2022 5/18/2022          10:09 AM 12:34 PM  1:25 PM   ,C125      1.5 - 35.0 U/mL 546 (H) 2479 (H) 3,987 (H)           Pathology    Name:    Gaurang Garcia   Accession Number:   [id number]   MR #   [id number]   Date Obtained:   4/28/2022              \"ABDOMINAL MASS\":  ADENOCARCINOMA, CONSISTENT WITH MULLERIAN ORIGIN. Sign Out Date: 5/3/2022  Chris Chavez MD                           [facility]- ER/RI/[id number] BY IHC                                                                                   Status:  Reviewed By:    Roque Chavez MD on 5/2/2022                                 Interpretation                       [facility] Flow Cytometric Analysis     Diagnosis:     No flow immunophenotypic evidence of a lymphoproliferative disorder. See full report in [address] as errors can occur when results are   imbedded into this report. [facility]-IMMUNOHISTOCHEMISTRY                                                                                   Status:  Reviewed By:    [place]. 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 Dr. Pricila Esparza concurs. Antibody/Test               Marker For                              Result   Keratin AE1/AE3             Broad spectrum epithelial marker                  Positive   Cytokeratin 7               Lung, breast, upper GE, serous ovary                  Positive   Cytokeratin 20               Colon, mucinous ovary, urothelium                  Negative   CDX2                    Gastrointestinal carcinomas                       Negative   GATA3               Urothelial, breast carcinoma                       Negative   Calretinin                Mesothelioma                                 Negative   ER                    Estrogen receptor                          Positive   Napsin                     Lung adenocarcinoma                           Negative   [place]-8                    Renal cell carcinoma                           Positive   TTF-1                     Lung and thyroid adenocarcinoma                 Negative   WT-1                    Serous carcinoma, mesothelioma                 Positive     Imaging/Diagnostics Last 24 Hours   No results found. Radiology:    CT Result (most recent):  CT CHEST ABDOMEN PELVIS W CONTRAST 10/21/2022    Narrative  EXAM: CT CHEST, ABDOMEN, AND PELVIS WITH CONTRAST    INDICATION: Malignant neoplasm of unspecified ovary;  Encounter for  antineoplastic chemotherapy. COMPARISON: None available    TECHNIQUE:  CT imaging was performed of the chest, abdomen, and pelvis after  intravenous injection of 100 mL Isovue 370. Intravenous contrast was used for  better evaluation of solid organs and vascular structures. Oral contrast was  used for bowel opacification. Coronal reformatted imaging provided. Radiation  dose reduction techniques were used for this study. Our CT scanners use one or  all of the following: Automated exposure control, adjustment of the mA and/or kV  according to patient size, iterative reconstruction. FINDINGS:    CHEST:    Mediastinum and visualized thyroid: Slightly smaller right cardiophrenic nodule. Heart: Normal.    Large Vessels: Normal.    Pleura: Normal.    Lungs: No discrete lung nodule. .    Airways: Normal.    ABDOMEN AND PELVIS:    Liver: Normal in size and morphology. No focal lesions. Gallbladder/biliary: Gallstone present. Pancreas: Normal.    Spleen: Normal.    Adrenals: Normal.    Kidneys: No focal lesion or hydronephrosis. Bladder: Normal.    Pelvic organs: Normal uterus. No adnexal mass. Gastrointestinal: Normal.    Peritoneum/retroperitoneum: 1.8 x 1.6 cm mass anteriorly in the peritoneal  cavity previously measured 3.1 x 2.4 cm. Left mesenteric mass measures 0.9 x 0.7  cm, previously 1.7 x 1.3 cm. No enlarging masses. Mass adjacent to the head of  the pancreas is not well seen and not measurable on the current study. Lymph nodes: Left para-aortic lymph node is smaller, currently 1.6 x 0.9 cm,  previously 2.1 x 1.4 cm. Small perigastric node. .    Vessels: Severe aortoiliac atherosclerotic disease. Bones/Soft tissues: No aggressive bone lesions. Multilevel degenerative changes  of the spine. No chest wall abnormality discerned on current study. No  aggressive appearing bone lesions. Multilevel degenerative changes of the spine. Impression  Left periaortic lymph node is smaller compared to prior.      PET Results (most recent):  PET CT SKULL BASE TO MID THIGH 03/29/2022    Narrative  PET/CT    Indication: Abdominal mass on outside CT    Radiopharmaceutical: 14.4 mCi F18-FDG, intravenously. Technique: Imaging was performed from the skull through the proximal thighs  using routine PET/CT acquisition protocol. Imaging was performed approximately  60 minutes post injection. Oral contrast was administered. Radiation dose  reduction techniques were used for this study:  Our CT scanners use one or all  of the following: Automated exposure control, adjustment of the mA and/or kVp  according to patient's size, iterative reconstruction. Serum glucose: 93 mg/dL prior to injection. Comparison studies: None available. Findings:    Head and Neck: No enlarged or hypermetabolic lymph nodes within the neck. Chest: A lymph node in the right cardiophrenic fat is hypermetabolic, measuring  1.6 cm in maximum SUV of 10.9. Hypermetabolic nodular and slightly consolidative appearing opacities in the  posterior right upper and lower lobes and to a lesser extent the left lower  lobe. Abdomen/Pelvis: Hypermetabolic solid masses throughout the abdomen index as  follows:  Peripancreatic/periportal mass measuring 3.7 cm, maximum SUV 20.3.  3.2 cm mass medially to the gastric fundus, maximum SUV 13.2  Left para-aortic mass measuring 2.6 cm, maximum SUV 15.6. Omental mass measuring 3.5 cm, maximum SUV 13.3. Left mesenteric mass measuring 2.8 cm, maximum SUV 16.7. Right lower quadrant mass measuring 8.9 x 5.1 cm, maximum SUV 19.5. Right pelvic sidewall mass measuring 6.4 x 5.0 cm, axillary SUV 26. Hypermetabolic gas and fluid containing collection measuring 4.1 cm adjacent to  the sigmoid colon contacting the uterine fundus. Impression  1. Hypermetabolic right cardiophrenic and abdominal masses as above concerning  for malignancy such as lymphoma.   2.  Hypermetabolic 4.1 cm fluid and gas-containing collection located between  the rectosigmoid junction and uterine fundus concerning for a possible abscess  or pyomyoma. Recommend correlation for any evidence of infection. 3.  Mildly hypermetabolic nodular and slightly consolidative appearing opacities  in the right greater than left lungs appearance most consistent with a subacute  infectious/inflammatory process. The study was referred to the imaging navigator to expedite delivery of results. 12    Mammo Results (most recent):  No results found for this or any previous visit from the past 365 days. US Result (most recent):  US ABDOMINAL MASS BIOPSY PERCUTANEOUS 04/28/2022    Narrative  History: Abdominal masses    Consent: Informed written and oral consent was obtained from the patient after  explanation of benefits and risks (including, but not limited to: Infection,  Hemorrhage, and Visceral injury) of procedure. The patient's questions were  answered and requested that we proceed. Procedure:  Sterile barrier technique (including:  cap, mask, sterile gloves,  sterile sheet, hand hygiene, and chlorhexidene for cutaneous antisepsis) were  used. Following routine prep and drape of the upper abdomen, a local field  block with 1% lidocaine was achieved. Using real-time ultrasound guidance, a 17  gauge needle was advanced into the superficial aspect of the left upper quadrant  abdominal mass. Through this base needle, 4 18 gauge core biopsies were  performed. The postprocedure ultrasound demonstrates no evidence of hematoma. Complications: None. Medications:  Under physician supervision, intraservice time of 17 minutes of  moderate intravenous conscious sedation was performed by administering Versed,  Fentanyl, intravenously. Continuous pulse oximetry, heart rate, and blood  pressure monitoring was performed with an independent, trained observer present.     Impression  Technically successful ultrasound guided left upper quadrant  abdominal mass

## 2022-10-26 ENCOUNTER — OFFICE VISIT (OUTPATIENT)
Dept: ONCOLOGY | Age: 87
End: 2022-10-26
Payer: MEDICARE

## 2022-10-26 VITALS
HEIGHT: 63 IN | BODY MASS INDEX: 32.6 KG/M2 | DIASTOLIC BLOOD PRESSURE: 93 MMHG | RESPIRATION RATE: 22 BRPM | HEART RATE: 82 BPM | WEIGHT: 184 LBS | SYSTOLIC BLOOD PRESSURE: 151 MMHG | TEMPERATURE: 98.4 F | OXYGEN SATURATION: 97 %

## 2022-10-26 DIAGNOSIS — C56.9 OVARY CANCER, UNSPECIFIED LATERALITY (HCC): Primary | ICD-10-CM

## 2022-10-26 DIAGNOSIS — Z51.11 ENCOUNTER FOR ANTINEOPLASTIC CHEMOTHERAPY: ICD-10-CM

## 2022-10-26 PROCEDURE — 1090F PRES/ABSN URINE INCON ASSESS: CPT | Performed by: OBSTETRICS & GYNECOLOGY

## 2022-10-26 PROCEDURE — 1123F ACP DISCUSS/DSCN MKR DOCD: CPT | Performed by: OBSTETRICS & GYNECOLOGY

## 2022-10-26 PROCEDURE — 99214 OFFICE O/P EST MOD 30 MIN: CPT | Performed by: OBSTETRICS & GYNECOLOGY

## 2022-10-26 PROCEDURE — G8484 FLU IMMUNIZE NO ADMIN: HCPCS | Performed by: OBSTETRICS & GYNECOLOGY

## 2022-10-26 PROCEDURE — G8400 PT W/DXA NO RESULTS DOC: HCPCS | Performed by: OBSTETRICS & GYNECOLOGY

## 2022-10-26 PROCEDURE — G8417 CALC BMI ABV UP PARAM F/U: HCPCS | Performed by: OBSTETRICS & GYNECOLOGY

## 2022-10-26 PROCEDURE — G8427 DOCREV CUR MEDS BY ELIG CLIN: HCPCS | Performed by: OBSTETRICS & GYNECOLOGY

## 2022-10-26 PROCEDURE — 1036F TOBACCO NON-USER: CPT | Performed by: OBSTETRICS & GYNECOLOGY

## 2022-10-26 ASSESSMENT — PATIENT HEALTH QUESTIONNAIRE - PHQ9
SUM OF ALL RESPONSES TO PHQ QUESTIONS 1-9: 0
2. FEELING DOWN, DEPRESSED OR HOPELESS: 0
SUM OF ALL RESPONSES TO PHQ9 QUESTIONS 1 & 2: 0
1. LITTLE INTEREST OR PLEASURE IN DOING THINGS: 0
SUM OF ALL RESPONSES TO PHQ QUESTIONS 1-9: 0

## 2022-11-08 ASSESSMENT — ENCOUNTER SYMPTOMS
GASTROINTESTINAL NEGATIVE: 1
RESPIRATORY NEGATIVE: 1
EYES NEGATIVE: 1
ALLERGIC/IMMUNOLOGIC NEGATIVE: 1

## 2022-11-08 NOTE — PROGRESS NOTES
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 who presents today for scheduled visit. Elevated CA-125 at dx. Possible recent hx GI perf/diverticulitis?, imaging 3/2022. Only sx was rectal bleeding, resolved. 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 Rae Alvarez, on nocturnal home  O2), NIDDM, htn, OA, right ankle surgery after fracture, hospitalization 2/8/22 for Covid-19 infection, UTI, dehydration. More recently seen by them and underwent PET scan after a more recent CT abd (reportedly done for abdominal discomfort and blood per rectum) reportedly had shown abdominal mass. PET scan revealed hypermetabolic multiple abdominal masses as well as right  cardiophrenic mass concerning for malignancy including lymphoma. A hypermetabolic 4.1 cm fluid and gas containing collection located at rectosigmoid junction and uterine fundus concerning for possible abscess also noted. A mildly hypermetabolic consolidative  appearing opacities in the right greater than left lung most consistent with subacute infection/inflammatory process also noted. Denies any recent fevers or drenching night sweats, appears non-toxic. 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 She has no GI//PULM/CV/Neuro complaints today. She sates she is tolerating chemotherapy well.  Pain is controlled with tramadol. Past Medical History     Past Medical History:   Diagnosis Date    COPD (chronic obstructive pulmonary disease) (HCC)     Depression     Diabetes (HCC)     Hypertension         Past Surgical History     Past Surgical History:   Procedure Laterality Date    ANKLE FRACTURE SURGERY      ankle broken    IR PORT PLACEMENT EQUAL OR GREATER THAN 5 YEARS  5/11/2022    IR PORT PLACEMENT EQUAL OR GREATER THAN 5 YEARS  5/11/2022    IR PORT PLACEMENT EQUAL OR GREATER THAN 5 YEARS 5/11/2022 [facility] SPECIALS    US ABDOMINAL MASS BIOPSY PERCUTANEOUS  4/28/2022    US ABDOMINAL MASS BIOPSY PERCUTANEOUS 4/28/2022 [facility] US        Medications      Current Outpatient Medications:     prochlorperazine (COMPAZINE) 5 MG tablet, TAKE 1 TABLET BY MOUTH EVERY 6 HOURS AS NEEDED FOR NAUSEA OR VOMITING, Disp: , Rfl:     ondansetron (ZOFRAN-ODT) 4 MG disintegrating tablet, , Disp: , Rfl:     lidocaine-prilocaine (EMLA) 2.5-2.5 % cream, Apply a dime size topically to port as needed 45 minutes prior to your lab or infusion appointment. , Disp: 5 g, Rfl: 1    albuterol sulfate HFA (PROVENTIL;VENTOLIN;PROAIR) 108 (90 Base) MCG/ACT inhaler, INHALE 2 PUFFS BY MOUTH EVERY 6 HOURS AS NEEDED, Disp: , Rfl:     aspirin 81 MG chewable tablet, Take 81 mg by mouth daily, Disp: , Rfl:     calcium carbonate-vitamin D 600-200 MG-UNIT TABS, Take 2 tablets by mouth daily, Disp: , Rfl:     cyanocobalamin 500 MCG tablet, Take 500 mcg by mouth daily, Disp: , Rfl:     glimepiride (AMARYL) 1 MG tablet, Take 1 mg by mouth daily, Disp: , Rfl:     ibuprofen (ADVIL;MOTRIN) 200 MG tablet, Take 3 tablets by mouth as needed, Disp: , Rfl:     lisinopril-hydroCHLOROthiazide (PRINZIDE;ZESTORETIC) 10-12.5 MG per tablet, Take 1 tablet by mouth daily, Disp: , Rfl:     LORazepam (ATIVAN) 0.5 MG tablet, Take 0.5 mg by mouth every 8 hours as needed.  , Disp: , Rfl:     umeclidinium-vilanterol (ANORO ELLIPTA) 62.5-25 MCG/INH AEPB inhaler, INHALE 1 PUFF BY MOUTH EVERY DAY, Disp: , Rfl:     Niraparib Tosylate (ZEJULA) 100 MG CAPS, Take 100 mg by mouth daily, Disp: 30 capsule, Rfl: 1    Niraparib Tosylate (ZEJULA) 100 MG CAPS, Take 200 mg by mouth daily (Patient not taking: No sig reported), Disp: 60 capsule, Rfl: 11    Omega-3 Fatty Acids (FISH OIL) 1000 MG CAPS, Take 1 capsule by mouth daily (Patient not taking: No sig reported), Disp: , Rfl:      Allergies   Metformin and Penicillins    Social History     Social History       Tobacco History       Smoking Status  Former      Smokeless Tobacco Use  Never              Alcohol History       Alcohol Use Status  Never              Drug Use       Drug Use Status  Never              Sexual Activity       Sexually Active  Not Asked                    Family History     Family History   Problem Relation Age of Onset    Colon Cancer Sister     Cancer Sister         Bladder    Cancer Brother         bladder       Review of Systems   Review of Systems   Constitutional: Negative. Negative for fatigue. HENT:  Positive for postnasal drip. Eyes: Negative. Respiratory: Negative. Cardiovascular: Negative. Gastrointestinal: Negative. Endocrine: Negative. Genitourinary: Negative. Musculoskeletal:  Positive for arthralgias and myalgias. Skin: Negative. Allergic/Immunologic: Negative. Neurological: Negative. Hematological: Negative. Psychiatric/Behavioral: Negative. All other systems reviewed and are negative. Physical Exam     ECOG PERFORMANCE STATUS - 1  Pain - 0 - No pain/10. Mild to moderate pain, requiring medication - see MAR     Fatigue - No flowsheet data found. Distress - No flowsheet data found. Blood pressure (!) 91/57, pulse (!) 106, temperature 98.4 °F (36.9 °C), temperature source Oral, resp. rate 18, height 5' 2.5\" (1.588 m), weight 179 lb 1.6 oz (81.2 kg), SpO2 96 %. Physical Exam  Vitals and nursing note reviewed.  Exam conducted with a chaperone present. Constitutional:       Appearance: Normal appearance. HENT:      Head: Normocephalic and atraumatic. Nose: No congestion. Cardiovascular:      Rate and Rhythm: Normal rate and regular rhythm. Pulses: Normal pulses. Heart sounds: Normal heart sounds. Pulmonary:      Effort: Pulmonary effort is normal. No respiratory distress. Breath sounds: No stridor. Wheezing present. No rhonchi. Abdominal:      General: Abdomen is flat. There is no distension. Palpations: Abdomen is soft. There is no mass. Musculoskeletal:      Cervical back: Normal range of motion and neck supple. Skin:     General: Skin is warm and dry. Neurological:      General: No focal deficit present. Mental Status: She is alert and oriented to person, place, and time. Psychiatric:         Mood and Affect: Mood normal.         Behavior: Behavior normal.         Thought Content:  Thought content normal.         Judgment: Judgment normal.       Labs        Recent Results (from the past 48 hour(s))   CBC with Auto Differential    Collection Time: 11/09/22 11:12 AM   Result Value Ref Range    WBC 10.7 4.3 - 11.1 K/uL    RBC 3.77 (L) 4.05 - 5.2 M/uL    Hemoglobin 12.3 11.7 - 15.4 g/dL    Hematocrit 38.8 35.8 - 46.3 %    .9 (H) 82.0 - 102.0 FL    MCH 32.6 26.1 - 32.9 PG    MCHC 31.7 31.4 - 35.0 g/dL    RDW 12.6 11.9 - 14.6 %    Platelets 368 423 - 152 K/uL    MPV 8.9 (L) 9.4 - 12.3 FL    nRBC 0.00 0.0 - 0.2 K/uL    Differential Type AUTOMATED      Seg Neutrophils 66 43 - 78 %    Lymphocytes 22 13 - 44 %    Monocytes 10 4.0 - 12.0 %    Eosinophils % 1 0.5 - 7.8 %    Basophils 0 0.0 - 2.0 %    Immature Granulocytes 1 0.0 - 5.0 %    Segs Absolute 7.0 1.7 - 8.2 K/UL    Absolute Lymph # 2.4 0.5 - 4.6 K/UL    Absolute Mono # 1.0 0.1 - 1.3 K/UL    Absolute Eos # 0.1 0.0 - 0.8 K/UL    Basophils Absolute 0.0 0.0 - 0.2 K/UL    Absolute Immature Granulocyte 0.1 0.0 - 0.5 K/UL   Comprehensive Metabolic Panel Collection Time: 11/09/22 11:12 AM   Result Value Ref Range    Sodium 134 133 - 143 mmol/L    Potassium 5.0 3.5 - 5.1 mmol/L    Chloride 100 (L) 101 - 110 mmol/L    CO2 30 21 - 32 mmol/L    Anion Gap 4 2 - 11 mmol/L    Glucose 77 65 - 100 mg/dL    BUN 36 (H) 8 - 23 MG/DL    Creatinine 1.60 (H) 0.6 - 1.0 MG/DL    Est, Glom Filt Rate 31 (L) >60 ml/min/1.73m2    Calcium 10.6 (H) 8.3 - 10.4 MG/DL    Total Bilirubin 0.4 0.2 - 1.1 MG/DL    ALT 25 12 - 65 U/L    AST 20 15 - 37 U/L    Alk Phosphatase 152 (H) 50 - 136 U/L    Total Protein 8.7 (H) 6.3 - 8.2 g/dL    Albumin 3.6 3.2 - 4.6 g/dL    Globulin 5.1 (H) 2.8 - 4.5 g/dL    Albumin/Globulin Ratio 0.7 0.4 - 1.6            Lab Results   Component Value Date     30 09/21/2022      Component      Latest Ref Rng & Units 9/21/2022 8/31/2022 8/10/2022 7/20/2022           9:43 AM  8:50 AM  9:29 AM  9:15 AM   ,C125      1.5 - 35.0 U/mL 30 34 56 (H) 163 (H)     Component      Latest Ref Rng & Units 6/29/2022 6/8/2022 5/18/2022          10:09 AM 12:34 PM  1:25 PM   ,C125      1.5 - 35.0 U/mL 546 (H) 2479 (H) 3,987 (H)           Pathology    Name:    Saul Wise   Accession Number:   [id number]   MR #   [id number]   Date Obtained:   4/28/2022              \"ABDOMINAL MASS\":  ADENOCARCINOMA, CONSISTENT WITH MULLERIAN ORIGIN. Sign Out Date: 5/3/2022  Cindi Good MD                           [facility]- ER/[place]/[id number] BY IHC                                                                                   Status:  Reviewed By:    Lucinda Good MD on 5/2/2022                                 Interpretation                       NeoOrigami Logic Flow Cytometric Analysis     Diagnosis:     No flow immunophenotypic evidence of a lymphoproliferative disorder. See full report in [facility] as errors can occur when results are   imbedded into this report.    STF-IMMUNOHISTOCHEMISTRY Status:  Reviewed By:    Patsy Ritchie. 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 Dr. Patric Webb concurs. Antibody/Test               Marker For                              Result   Keratin AE1/AE3             Broad spectrum epithelial marker                  Positive   Cytokeratin 7               Lung, breast, upper GE, serous ovary                  Positive   Cytokeratin 20               Colon, mucinous ovary, urothelium                  Negative   CDX2                    Gastrointestinal carcinomas                       Negative   GATA3               Urothelial, breast carcinoma                       Negative   Calretinin                Mesothelioma                                 Negative   ER                    Estrogen receptor                          Positive   Napsin                     Lung adenocarcinoma                           Negative   [place]-8                    Renal cell carcinoma                           Positive   TTF-1                     Lung and thyroid adenocarcinoma                 Negative   WT-1                    Serous carcinoma, mesothelioma                 Positive     Imaging/Diagnostics Last 24 Hours   No results found. Radiology:    CT Result (most recent):  CT CHEST ABDOMEN PELVIS W CONTRAST 10/21/2022    Narrative  EXAM: CT CHEST, ABDOMEN, AND PELVIS WITH CONTRAST    INDICATION: Malignant neoplasm of unspecified ovary; Encounter for  antineoplastic chemotherapy. COMPARISON: None available    TECHNIQUE:  CT imaging was performed of the chest, abdomen, and pelvis after  intravenous injection of 100 mL Isovue 370. Intravenous contrast was used for  better evaluation of solid organs and vascular structures. Oral contrast was  used for bowel opacification.  Coronal reformatted imaging provided. Radiation  dose reduction techniques were used for this study. Our CT scanners use one or  all of the following: Automated exposure control, adjustment of the mA and/or kV  according to patient size, iterative reconstruction. FINDINGS:    CHEST:    Mediastinum and visualized thyroid: Slightly smaller right cardiophrenic nodule. Heart: Normal.    Large Vessels: Normal.    Pleura: Normal.    Lungs: No discrete lung nodule. .    Airways: Normal.    ABDOMEN AND PELVIS:    Liver: Normal in size and morphology. No focal lesions. Gallbladder/biliary: Gallstone present. Pancreas: Normal.    Spleen: Normal.    Adrenals: Normal.    Kidneys: No focal lesion or hydronephrosis. Bladder: Normal.    Pelvic organs: Normal uterus. No adnexal mass. Gastrointestinal: Normal.    Peritoneum/retroperitoneum: 1.8 x 1.6 cm mass anteriorly in the peritoneal  cavity previously measured 3.1 x 2.4 cm. Left mesenteric mass measures 0.9 x 0.7  cm, previously 1.7 x 1.3 cm. No enlarging masses. Mass adjacent to the head of  the pancreas is not well seen and not measurable on the current study. Lymph nodes: Left para-aortic lymph node is smaller, currently 1.6 x 0.9 cm,  previously 2.1 x 1.4 cm. Small perigastric node. .    Vessels: Severe aortoiliac atherosclerotic disease. Bones/Soft tissues: No aggressive bone lesions. Multilevel degenerative changes  of the spine. No chest wall abnormality discerned on current study. No  aggressive appearing bone lesions. Multilevel degenerative changes of the spine. Impression  Left periaortic lymph node is smaller compared to prior. PET Results (most recent):  PET CT SKULL BASE TO MID THIGH 03/29/2022    Narrative  PET/CT    Indication: Abdominal mass on outside CT    Radiopharmaceutical: 14.4 mCi F18-FDG, intravenously. Technique: Imaging was performed from the skull through the proximal thighs  using routine PET/CT acquisition protocol.  Imaging was performed approximately  60 minutes post injection. Oral contrast was administered. Radiation dose  reduction techniques were used for this study:  Our CT scanners use one or all  of the following: Automated exposure control, adjustment of the mA and/or kVp  according to patient's size, iterative reconstruction. Serum glucose: 93 mg/dL prior to injection. Comparison studies: None available. Findings:    Head and Neck: No enlarged or hypermetabolic lymph nodes within the neck. Chest: A lymph node in the right cardiophrenic fat is hypermetabolic, measuring  1.6 cm in maximum SUV of 10.9. Hypermetabolic nodular and slightly consolidative appearing opacities in the  posterior right upper and lower lobes and to a lesser extent the left lower  lobe. Abdomen/Pelvis: Hypermetabolic solid masses throughout the abdomen index as  follows:  Peripancreatic/periportal mass measuring 3.7 cm, maximum SUV 20.3.  3.2 cm mass medially to the gastric fundus, maximum SUV 13.2  Left para-aortic mass measuring 2.6 cm, maximum SUV 15.6. Omental mass measuring 3.5 cm, maximum SUV 13.3. Left mesenteric mass measuring 2.8 cm, maximum SUV 16.7. Right lower quadrant mass measuring 8.9 x 5.1 cm, maximum SUV 19.5. Right pelvic sidewall mass measuring 6.4 x 5.0 cm, axillary SUV 26. Hypermetabolic gas and fluid containing collection measuring 4.1 cm adjacent to  the sigmoid colon contacting the uterine fundus. Impression  1. Hypermetabolic right cardiophrenic and abdominal masses as above concerning  for malignancy such as lymphoma. 2.  Hypermetabolic 4.1 cm fluid and gas-containing collection located between  the rectosigmoid junction and uterine fundus concerning for a possible abscess  or pyomyoma. Recommend correlation for any evidence of infection.   3.  Mildly hypermetabolic nodular and slightly consolidative appearing opacities  in the right greater than left lungs appearance most consistent with a subacute  infectious/inflammatory process. The study was referred to the imaging navigator to expedite delivery of results. 12    Mammo Results (most recent):  No results found for this or any previous visit from the past 365 days. US Result (most recent):  US ABDOMINAL MASS BIOPSY PERCUTANEOUS 04/28/2022    Narrative  History: Abdominal masses    Consent: Informed written and oral consent was obtained from the patient after  explanation of benefits and risks (including, but not limited to: Infection,  Hemorrhage, and Visceral injury) of procedure. The patient's questions were  answered and requested that we proceed. Procedure:  Sterile barrier technique (including:  cap, mask, sterile gloves,  sterile sheet, hand hygiene, and chlorhexidene for cutaneous antisepsis) were  used. Following routine prep and drape of the upper abdomen, a local field  block with 1% lidocaine was achieved. Using real-time ultrasound guidance, a 17  gauge needle was advanced into the superficial aspect of the left upper quadrant  abdominal mass. Through this base needle, 4 18 gauge core biopsies were  performed. The postprocedure ultrasound demonstrates no evidence of hematoma. Complications: None. Medications:  Under physician supervision, intraservice time of 17 minutes of  moderate intravenous conscious sedation was performed by administering Versed,  Fentanyl, intravenously. Continuous pulse oximetry, heart rate, and blood  pressure monitoring was performed with an independent, trained observer present. Impression  Technically successful ultrasound guided left upper quadrant  abdominal mass biopsy. The gas and fluid filled focus in the pelvis is not  amenable to percutaneous drainage or biopsy. Specimen: Sent to cytopathology. Plan: The patient will recover for approximately 1 hour         Assessment       Diagnosis Orders   1.  Ovary cancer, unspecified laterality ([facility] [address]  CBC with Auto Differential    Comprehensive Metabolic Panel    CBC with Auto Differential    Comprehensive Metabolic Panel            Specialty Problems          Oncology Problems    Malignant neoplasm of ovary (HCC)                 Pt tolerating chemo therapy well   CA-125 continued to improve  Labs reviewed    Plan     Kelseyrodrigue Resendiz reviewed in detail, rba, morbidity, sides, management discussed   Hold meds, contact office if percieved issues with increased dose.   Labs q2 weeks, visit in 8 with dose increase today  Uri eval with pcp recommended            Deepa Leonard MD, [facility]  [address] [place] [address]  Office : [phone]  Fax : [phone]

## 2022-11-09 ENCOUNTER — HOSPITAL ENCOUNTER (OUTPATIENT)
Dept: LAB | Age: 87
Discharge: HOME | End: 2022-11-12
Payer: MEDICARE

## 2022-11-09 ENCOUNTER — OFFICE VISIT (OUTPATIENT)
Dept: ONCOLOGY | Age: 87
End: 2022-11-09
Payer: MEDICARE

## 2022-11-09 VITALS
RESPIRATION RATE: 18 BRPM | DIASTOLIC BLOOD PRESSURE: 57 MMHG | BODY MASS INDEX: 31.73 KG/M2 | OXYGEN SATURATION: 96 % | SYSTOLIC BLOOD PRESSURE: 91 MMHG | TEMPERATURE: 98.4 F | WEIGHT: 179.1 LBS | HEIGHT: 63 IN | HEART RATE: 106 BPM

## 2022-11-09 DIAGNOSIS — C56.9 OVARY CANCER, UNSPECIFIED LATERALITY (HCC): ICD-10-CM

## 2022-11-09 DIAGNOSIS — C56.9 OVARY CANCER, UNSPECIFIED LATERALITY (HCC): Primary | ICD-10-CM

## 2022-11-09 LAB
ALBUMIN SERPL-MCNC: 3.6 G/DL (ref 3.2–4.6)
ALBUMIN/GLOB SERPL: 0.7 {RATIO} (ref 0.4–1.6)
ALP SERPL-CCNC: 152 U/L (ref 50–136)
ALT SERPL-CCNC: 25 U/L (ref 12–65)
ANION GAP SERPL CALC-SCNC: 4 MMOL/L (ref 2–11)
AST SERPL-CCNC: 20 U/L (ref 15–37)
BASOPHILS # BLD: 0 K/UL (ref 0–0.2)
BASOPHILS NFR BLD: 0 % (ref 0–2)
BILIRUB SERPL-MCNC: 0.4 MG/DL (ref 0.2–1.1)
BUN SERPL-MCNC: 36 MG/DL (ref 8–23)
CALCIUM SERPL-MCNC: 10.6 MG/DL (ref 8.3–10.4)
CHLORIDE SERPL-SCNC: 100 MMOL/L (ref 101–110)
CO2 SERPL-SCNC: 30 MMOL/L (ref 21–32)
CREAT SERPL-MCNC: 1.6 MG/DL (ref 0.6–1)
DIFFERENTIAL METHOD BLD: ABNORMAL
EOSINOPHIL # BLD: 0.1 K/UL (ref 0–0.8)
EOSINOPHIL NFR BLD: 1 % (ref 0.5–7.8)
ERYTHROCYTE [DISTWIDTH] IN BLOOD BY AUTOMATED COUNT: 12.6 % (ref 11.9–14.6)
GLOBULIN SER CALC-MCNC: 5.1 G/DL (ref 2.8–4.5)
GLUCOSE SERPL-MCNC: 77 MG/DL (ref 65–100)
HCT VFR BLD AUTO: 38.8 % (ref 35.8–46.3)
HGB BLD-MCNC: 12.3 G/DL (ref 11.7–15.4)
IMM GRANULOCYTES # BLD AUTO: 0.1 K/UL (ref 0–0.5)
IMM GRANULOCYTES NFR BLD AUTO: 1 % (ref 0–5)
LYMPHOCYTES # BLD: 2.4 K/UL (ref 0.5–4.6)
LYMPHOCYTES NFR BLD: 22 % (ref 13–44)
MCH RBC QN AUTO: 32.6 PG (ref 26.1–32.9)
MCHC RBC AUTO-ENTMCNC: 31.7 G/DL (ref 31.4–35)
MCV RBC AUTO: 102.9 FL (ref 82–102)
MONOCYTES # BLD: 1 K/UL (ref 0.1–1.3)
MONOCYTES NFR BLD: 10 % (ref 4–12)
NEUTS SEG # BLD: 7 K/UL (ref 1.7–8.2)
NEUTS SEG NFR BLD: 66 % (ref 43–78)
NRBC # BLD: 0 K/UL (ref 0–0.2)
PLATELET # BLD AUTO: 288 K/UL (ref 150–450)
PMV BLD AUTO: 8.9 FL (ref 9.4–12.3)
POTASSIUM SERPL-SCNC: 5 MMOL/L (ref 3.5–5.1)
PROT SERPL-MCNC: 8.7 G/DL (ref 6.3–8.2)
RBC # BLD AUTO: 3.77 M/UL (ref 4.05–5.2)
SODIUM SERPL-SCNC: 134 MMOL/L (ref 133–143)
WBC # BLD AUTO: 10.7 K/UL (ref 4.3–11.1)

## 2022-11-09 PROCEDURE — 85025 COMPLETE CBC W/AUTO DIFF WBC: CPT

## 2022-11-09 PROCEDURE — 1036F TOBACCO NON-USER: CPT | Performed by: OBSTETRICS & GYNECOLOGY

## 2022-11-09 PROCEDURE — G8427 DOCREV CUR MEDS BY ELIG CLIN: HCPCS | Performed by: OBSTETRICS & GYNECOLOGY

## 2022-11-09 PROCEDURE — 80053 COMPREHEN METABOLIC PANEL: CPT

## 2022-11-09 PROCEDURE — 36415 COLL VENOUS BLD VENIPUNCTURE: CPT

## 2022-11-09 PROCEDURE — G8417 CALC BMI ABV UP PARAM F/U: HCPCS | Performed by: OBSTETRICS & GYNECOLOGY

## 2022-11-09 PROCEDURE — 1090F PRES/ABSN URINE INCON ASSESS: CPT | Performed by: OBSTETRICS & GYNECOLOGY

## 2022-11-09 PROCEDURE — G8484 FLU IMMUNIZE NO ADMIN: HCPCS | Performed by: OBSTETRICS & GYNECOLOGY

## 2022-11-09 PROCEDURE — 99214 OFFICE O/P EST MOD 30 MIN: CPT | Performed by: OBSTETRICS & GYNECOLOGY

## 2022-11-09 PROCEDURE — G8400 PT W/DXA NO RESULTS DOC: HCPCS | Performed by: OBSTETRICS & GYNECOLOGY

## 2022-11-09 PROCEDURE — 1123F ACP DISCUSS/DSCN MKR DOCD: CPT | Performed by: OBSTETRICS & GYNECOLOGY

## 2022-11-09 ASSESSMENT — PATIENT HEALTH QUESTIONNAIRE - PHQ9
SUM OF ALL RESPONSES TO PHQ QUESTIONS 1-9: 0
1. LITTLE INTEREST OR PLEASURE IN DOING THINGS: 0
SUM OF ALL RESPONSES TO PHQ9 QUESTIONS 1 & 2: 0
SUM OF ALL RESPONSES TO PHQ QUESTIONS 1-9: 0
2. FEELING DOWN, DEPRESSED OR HOPELESS: 0
SUM OF ALL RESPONSES TO PHQ QUESTIONS 1-9: 0
SUM OF ALL RESPONSES TO PHQ QUESTIONS 1-9: 0

## 2022-11-09 NOTE — PATIENT INSTRUCTIONS
Patient Instructions from Today's Visit    Reason for Visit:  Follow up    Diagnosis Information:  [email or web address]      Plan:  Keep taking you pill    Follow Up:  Lab only at [facility] in 2 weeks and 4 weeks  OV in 8 weeks    Recent Lab Results:  No visits with results within 1 Day(s) from this visit. Latest known visit with results is:   Hospital Outpatient Visit on 10/21/2022   Component Date Value Ref Range Status    POC Creatinine 10/21/2022 1.17  0.8 - 1.5 mg/dL Final    eGFR, POC 10/21/2022 46 (A)  >60 ml/min/1.73m2 Final    Comment:      Pediatric calculator link: Young.at. org/professionals/kdoqi/gfr_calculatorped       Effective Oct 3, 2022       These results are not intended for use in patients <25years of age. eGFR results are calculated without a race factor using  the 2021 CKD-EPI equation. Careful clinical correlation is recommended, particularly when comparing to results calculated using previous equations. The CKD-EPI equation is less accurate in patients with extremes of muscle mass, extra-renal metabolism of creatinine, excessive creatine ingestion, or following therapy that affects renal tubular secretion. Treatment Summary has been discussed and given to patient: n/a    Oral Chemotherapy Adherence:     Current Regimen:  Drug Name: Jose Luis Reveles  Dose: 100mg  Frequency: Increase to 200 mg daily    Barriers to care identified including (financial, physical, psychosocial) : No    Missed doses reported: No    Patient verbalizes understanding of what to do in the event of a missed dose: Yes    Adverse reactions/toxicities reported:None             -------------------------------------------------------------------------------------------------------------------    Patient does express an interest in My Chart.   My Chart log in information explained on the after visit summary printout at the check-out alberto Ham, RN, MSN, OCN  Gynecology Nurse Navigator for Dr. Wil Garcia  [address] [place] [address], [address] [place] [address]  Phone:  [phone]  Fax: [phone]  Email: [email or web address]

## 2022-11-23 ENCOUNTER — TELEPHONE (OUTPATIENT)
Dept: CASE MANAGEMENT | Age: 87
End: 2022-11-23

## 2022-11-23 NOTE — TELEPHONE ENCOUNTER
Roshan Ramsey and reviewed lab work from today. She reports taking Zejula 200 mg (2 pills a day). Creatinine slight increase from 1.6 to 1.7 stressed importance for increased oral fluid intake. She reported understanding. CBC reviewed. She will have labs repeated at [facility] in 2 weeks she knows to call with any concerns.

## 2022-12-13 ENCOUNTER — TELEPHONE (OUTPATIENT)
Dept: ONCOLOGY | Age: 87
End: 2022-12-13

## 2022-12-13 NOTE — TELEPHONE ENCOUNTER
Reviewed labs with Dr Chaz Karimi and called them back. They aware to increase oral fluids, continue to follow with PCP and no change to Zejula. They are aware of appointment in January

## 2022-12-13 NOTE — TELEPHONE ENCOUNTER
Received call from Pt Torsten Marcano & she state pt had labs done last week & wanted to a call back to discuss the results.

## 2023-01-16 DIAGNOSIS — C56.9 OVARY CANCER, UNSPECIFIED LATERALITY (HCC): Primary | ICD-10-CM

## 2023-01-17 ENCOUNTER — OFFICE VISIT (OUTPATIENT)
Dept: INTERNAL MEDICINE CLINIC | Facility: CLINIC | Age: 88
End: 2023-01-17
Payer: MEDICARE

## 2023-01-17 VITALS
HEIGHT: 63 IN | BODY MASS INDEX: 31.54 KG/M2 | SYSTOLIC BLOOD PRESSURE: 129 MMHG | OXYGEN SATURATION: 94 % | WEIGHT: 178 LBS | TEMPERATURE: 97.1 F | DIASTOLIC BLOOD PRESSURE: 63 MMHG | HEART RATE: 72 BPM

## 2023-01-17 DIAGNOSIS — C56.9 OVARY CANCER, UNSPECIFIED LATERALITY (HCC): ICD-10-CM

## 2023-01-17 DIAGNOSIS — Z23 NEED FOR SHINGLES VACCINE: ICD-10-CM

## 2023-01-17 DIAGNOSIS — E55.9 VITAMIN D DEFICIENCY: ICD-10-CM

## 2023-01-17 DIAGNOSIS — N18.30 STAGE 3 CHRONIC KIDNEY DISEASE, UNSPECIFIED WHETHER STAGE 3A OR 3B CKD (HCC): ICD-10-CM

## 2023-01-17 DIAGNOSIS — E11.22 TYPE 2 DIABETES MELLITUS WITH STAGE 3 CHRONIC KIDNEY DISEASE, WITHOUT LONG-TERM CURRENT USE OF INSULIN, UNSPECIFIED WHETHER STAGE 3A OR 3B CKD (HCC): ICD-10-CM

## 2023-01-17 DIAGNOSIS — I10 PRIMARY HYPERTENSION: ICD-10-CM

## 2023-01-17 DIAGNOSIS — M25.562 CHRONIC PAIN OF BOTH KNEES: Chronic | ICD-10-CM

## 2023-01-17 DIAGNOSIS — Z00.00 ROUTINE GENERAL MEDICAL EXAMINATION AT A HEALTH CARE FACILITY: Primary | ICD-10-CM

## 2023-01-17 DIAGNOSIS — Z23 NEEDS FLU SHOT: ICD-10-CM

## 2023-01-17 DIAGNOSIS — G89.29 CHRONIC PAIN OF BOTH KNEES: Chronic | ICD-10-CM

## 2023-01-17 DIAGNOSIS — N18.30 TYPE 2 DIABETES MELLITUS WITH STAGE 3 CHRONIC KIDNEY DISEASE, WITHOUT LONG-TERM CURRENT USE OF INSULIN, UNSPECIFIED WHETHER STAGE 3A OR 3B CKD (HCC): ICD-10-CM

## 2023-01-17 DIAGNOSIS — J44.9 CHRONIC OBSTRUCTIVE PULMONARY DISEASE, UNSPECIFIED COPD TYPE (HCC): Chronic | ICD-10-CM

## 2023-01-17 DIAGNOSIS — M25.561 CHRONIC PAIN OF BOTH KNEES: Chronic | ICD-10-CM

## 2023-01-17 PROBLEM — E11.9 TYPE 2 DIABETES MELLITUS WITHOUT COMPLICATION, WITHOUT LONG-TERM CURRENT USE OF INSULIN (HCC): Status: ACTIVE | Noted: 2023-01-17

## 2023-01-17 PROBLEM — Z51.11 ENCOUNTER FOR CHEMOTHERAPY MANAGEMENT: Status: RESOLVED | Noted: 2022-06-29 | Resolved: 2023-01-17

## 2023-01-17 LAB
ALBUMIN SERPL-MCNC: 3.7 G/DL (ref 3.2–4.6)
ALBUMIN/GLOB SERPL: 0.9 (ref 0.4–1.6)
ALP SERPL-CCNC: 173 U/L (ref 50–136)
ALT SERPL-CCNC: 25 U/L (ref 12–65)
ANION GAP SERPL CALC-SCNC: 11 MMOL/L (ref 2–11)
AST SERPL-CCNC: 24 U/L (ref 15–37)
BASOPHILS # BLD: 0.1 K/UL (ref 0–0.2)
BASOPHILS NFR BLD: 1 % (ref 0–2)
BILIRUB SERPL-MCNC: 0.7 MG/DL (ref 0.2–1.1)
BUN SERPL-MCNC: 31 MG/DL (ref 8–23)
CALCIUM SERPL-MCNC: 10.4 MG/DL (ref 8.3–10.4)
CHLORIDE SERPL-SCNC: 100 MMOL/L (ref 101–110)
CHOLEST SERPL-MCNC: 167 MG/DL
CO2 SERPL-SCNC: 25 MMOL/L (ref 21–32)
CREAT SERPL-MCNC: 1.8 MG/DL (ref 0.6–1)
DIFFERENTIAL METHOD BLD: ABNORMAL
EOSINOPHIL # BLD: 0.2 K/UL (ref 0–0.8)
EOSINOPHIL NFR BLD: 1 % (ref 0.5–7.8)
ERYTHROCYTE [DISTWIDTH] IN BLOOD BY AUTOMATED COUNT: 14.1 % (ref 11.9–14.6)
GLOBULIN SER CALC-MCNC: 4 G/DL (ref 2.8–4.5)
GLUCOSE SERPL-MCNC: 98 MG/DL (ref 65–100)
HCT VFR BLD AUTO: 35.9 % (ref 35.8–46.3)
HDLC SERPL-MCNC: 40 MG/DL (ref 40–60)
HDLC SERPL: 4.2
HGB BLD-MCNC: 11.3 G/DL (ref 11.7–15.4)
IMM GRANULOCYTES # BLD AUTO: 0.1 K/UL (ref 0–0.5)
IMM GRANULOCYTES NFR BLD AUTO: 1 % (ref 0–5)
LDLC SERPL CALC-MCNC: 78.8 MG/DL
LYMPHOCYTES # BLD: 2 K/UL (ref 0.5–4.6)
LYMPHOCYTES NFR BLD: 18 % (ref 13–44)
MCH RBC QN AUTO: 32.5 PG (ref 26.1–32.9)
MCHC RBC AUTO-ENTMCNC: 31.5 G/DL (ref 31.4–35)
MCV RBC AUTO: 103.2 FL (ref 82–102)
MONOCYTES # BLD: 1 K/UL (ref 0.1–1.3)
MONOCYTES NFR BLD: 9 % (ref 4–12)
NEUTS SEG # BLD: 7.7 K/UL (ref 1.7–8.2)
NEUTS SEG NFR BLD: 70 % (ref 43–78)
NRBC # BLD: 0 K/UL (ref 0–0.2)
PLATELET # BLD AUTO: 404 K/UL (ref 150–450)
PMV BLD AUTO: 9.2 FL (ref 9.4–12.3)
POTASSIUM SERPL-SCNC: 4.6 MMOL/L (ref 3.5–5.1)
PROT SERPL-MCNC: 7.7 G/DL (ref 6.3–8.2)
RBC # BLD AUTO: 3.48 M/UL (ref 4.05–5.2)
SODIUM SERPL-SCNC: 136 MMOL/L (ref 133–143)
TRIGL SERPL-MCNC: 241 MG/DL (ref 35–150)
TSH W FREE THYROID IF ABNORMAL: 2.45 UIU/ML (ref 0.36–3.74)
VLDLC SERPL CALC-MCNC: 48.2 MG/DL (ref 6–23)
WBC # BLD AUTO: 11 K/UL (ref 4.3–11.1)

## 2023-01-17 PROCEDURE — G8400 PT W/DXA NO RESULTS DOC: HCPCS | Performed by: INTERNAL MEDICINE

## 2023-01-17 PROCEDURE — G8417 CALC BMI ABV UP PARAM F/U: HCPCS | Performed by: INTERNAL MEDICINE

## 2023-01-17 PROCEDURE — 1123F ACP DISCUSS/DSCN MKR DOCD: CPT | Performed by: INTERNAL MEDICINE

## 2023-01-17 PROCEDURE — G8484 FLU IMMUNIZE NO ADMIN: HCPCS | Performed by: INTERNAL MEDICINE

## 2023-01-17 PROCEDURE — G0008 ADMIN INFLUENZA VIRUS VAC: HCPCS | Performed by: INTERNAL MEDICINE

## 2023-01-17 PROCEDURE — 3074F SYST BP LT 130 MM HG: CPT | Performed by: INTERNAL MEDICINE

## 2023-01-17 PROCEDURE — G8427 DOCREV CUR MEDS BY ELIG CLIN: HCPCS | Performed by: INTERNAL MEDICINE

## 2023-01-17 PROCEDURE — 1036F TOBACCO NON-USER: CPT | Performed by: INTERNAL MEDICINE

## 2023-01-17 PROCEDURE — 99205 OFFICE O/P NEW HI 60 MIN: CPT | Performed by: INTERNAL MEDICINE

## 2023-01-17 PROCEDURE — 3078F DIAST BP <80 MM HG: CPT | Performed by: INTERNAL MEDICINE

## 2023-01-17 PROCEDURE — 3023F SPIROM DOC REV: CPT | Performed by: INTERNAL MEDICINE

## 2023-01-17 PROCEDURE — 90694 VACC AIIV4 NO PRSRV 0.5ML IM: CPT | Performed by: INTERNAL MEDICINE

## 2023-01-17 PROCEDURE — 1090F PRES/ABSN URINE INCON ASSESS: CPT | Performed by: INTERNAL MEDICINE

## 2023-01-17 RX ORDER — ASCORBIC ACID 500 MG
500 TABLET ORAL DAILY
COMMUNITY

## 2023-01-17 RX ORDER — ZOSTER VACCINE RECOMBINANT, ADJUVANTED 50 MCG/0.5
0.5 KIT INTRAMUSCULAR SEE ADMIN INSTRUCTIONS
Qty: 0.5 ML | Refills: 0 | Status: SHIPPED | OUTPATIENT
Start: 2023-01-17 | End: 2023-07-16

## 2023-01-17 SDOH — ECONOMIC STABILITY: INCOME INSECURITY: IN THE LAST 12 MONTHS, WAS THERE A TIME WHEN YOU WERE NOT ABLE TO PAY THE MORTGAGE OR RENT ON TIME?: NO

## 2023-01-17 SDOH — ECONOMIC STABILITY: FOOD INSECURITY: WITHIN THE PAST 12 MONTHS, THE FOOD YOU BOUGHT JUST DIDN'T LAST AND YOU DIDN'T HAVE MONEY TO GET MORE.: NEVER TRUE

## 2023-01-17 SDOH — ECONOMIC STABILITY: TRANSPORTATION INSECURITY
IN THE PAST 12 MONTHS, HAS LACK OF TRANSPORTATION KEPT YOU FROM MEETINGS, WORK, OR FROM GETTING THINGS NEEDED FOR DAILY LIVING?: NO

## 2023-01-17 SDOH — ECONOMIC STABILITY: HOUSING INSECURITY: IN THE LAST 12 MONTHS, HOW MANY PLACES HAVE YOU LIVED?: 1

## 2023-01-17 SDOH — HEALTH STABILITY: PHYSICAL HEALTH: ON AVERAGE, HOW MANY MINUTES DO YOU ENGAGE IN EXERCISE AT THIS LEVEL?: 0 MIN

## 2023-01-17 SDOH — ECONOMIC STABILITY: HOUSING INSECURITY
IN THE LAST 12 MONTHS, WAS THERE A TIME WHEN YOU DID NOT HAVE A STEADY PLACE TO SLEEP OR SLEPT IN A SHELTER (INCLUDING NOW)?: NO

## 2023-01-17 SDOH — ECONOMIC STABILITY: FOOD INSECURITY: WITHIN THE PAST 12 MONTHS, YOU WORRIED THAT YOUR FOOD WOULD RUN OUT BEFORE YOU GOT MONEY TO BUY MORE.: NEVER TRUE

## 2023-01-17 SDOH — ECONOMIC STABILITY: TRANSPORTATION INSECURITY
IN THE PAST 12 MONTHS, HAS THE LACK OF TRANSPORTATION KEPT YOU FROM MEDICAL APPOINTMENTS OR FROM GETTING MEDICATIONS?: NO

## 2023-01-17 SDOH — HEALTH STABILITY: PHYSICAL HEALTH: ON AVERAGE, HOW MANY DAYS PER WEEK DO YOU ENGAGE IN MODERATE TO STRENUOUS EXERCISE (LIKE A BRISK WALK)?: 0 DAYS

## 2023-01-17 ASSESSMENT — PATIENT HEALTH QUESTIONNAIRE - PHQ9
SUM OF ALL RESPONSES TO PHQ QUESTIONS 1-9: 0
SUM OF ALL RESPONSES TO PHQ QUESTIONS 1-9: 0
1. LITTLE INTEREST OR PLEASURE IN DOING THINGS: 0
SUM OF ALL RESPONSES TO PHQ QUESTIONS 1-9: 0
SUM OF ALL RESPONSES TO PHQ9 QUESTIONS 1 & 2: 0
2. FEELING DOWN, DEPRESSED OR HOPELESS: 0
SUM OF ALL RESPONSES TO PHQ QUESTIONS 1-9: 0

## 2023-01-17 ASSESSMENT — LIFESTYLE VARIABLES
HOW OFTEN DO YOU HAVE A DRINK CONTAINING ALCOHOL: NEVER
HOW MANY STANDARD DRINKS CONTAINING ALCOHOL DO YOU HAVE ON A TYPICAL DAY: PATIENT DOES NOT DRINK

## 2023-01-17 ASSESSMENT — ENCOUNTER SYMPTOMS
NAUSEA: 0
GASTROINTESTINAL NEGATIVE: 1
VOMITING: 0
EYES NEGATIVE: 1
SHORTNESS OF BREATH: 1
COUGH: 0
ALLERGIC/IMMUNOLOGIC NEGATIVE: 1
RESPIRATORY NEGATIVE: 1

## 2023-01-17 ASSESSMENT — SOCIAL DETERMINANTS OF HEALTH (SDOH)
WITHIN THE LAST YEAR, HAVE YOU BEEN HUMILIATED OR EMOTIONALLY ABUSED IN OTHER WAYS BY YOUR PARTNER OR EX-PARTNER?: NO
IN A TYPICAL WEEK, HOW MANY TIMES DO YOU TALK ON THE PHONE WITH FAMILY, FRIENDS, OR NEIGHBORS?: MORE THAN THREE TIMES A WEEK
DO YOU BELONG TO ANY CLUBS OR ORGANIZATIONS SUCH AS CHURCH GROUPS UNIONS, FRATERNAL OR ATHLETIC GROUPS, OR SCHOOL GROUPS?: YES
WITHIN THE LAST YEAR, HAVE YOU BEEN AFRAID OF YOUR PARTNER OR EX-PARTNER?: NO
HOW OFTEN DO YOU ATTEND CHURCH OR RELIGIOUS SERVICES?: 1 TO 4 TIMES PER YEAR
HOW OFTEN DO YOU GET TOGETHER WITH FRIENDS OR RELATIVES?: MORE THAN THREE TIMES A WEEK
WITHIN THE LAST YEAR, HAVE TO BEEN RAPED OR FORCED TO HAVE ANY KIND OF SEXUAL ACTIVITY BY YOUR PARTNER OR EX-PARTNER?: NO
WITHIN THE LAST YEAR, HAVE YOU BEEN KICKED, HIT, SLAPPED, OR OTHERWISE PHYSICALLY HURT BY YOUR PARTNER OR EX-PARTNER?: NO
HOW HARD IS IT FOR YOU TO PAY FOR THE VERY BASICS LIKE FOOD, HOUSING, MEDICAL CARE, AND HEATING?: NOT HARD AT ALL
HOW OFTEN DO YOU ATTENT MEETINGS OF THE CLUB OR ORGANIZATION YOU BELONG TO?: 1 TO 4 TIMES PER YEAR

## 2023-01-17 NOTE — PROGRESS NOTES
Chief Complaint   Patient presents with    Establish Care     Pt presents to the office today to establish care as a new pt. HPI  The patient comes to the clinic with Rayshawn De Guzman, her fried, to establish care. No acute signs or symptoms mentioned at this time. She does want to report bilateral knee pain, for which she does not take any specific analgesics on a routine basis. Ms. Laura Desai was diagnosed with ovarian cancer last year, and was treated with chemotherapy. 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 She is being managed at [facility] Dr. Porfirio Vega. Her medical history is significant also for diabetes mellitus, systemic hypertension and COPD. According to the patient, her illness is in remission. She lives with her son at home. Reviewed and updated this visit by provider:  Tobacco  Allergies  Meds  Problems  Med Hx  Surg Hx  Fam Hx       Review of Systems   Constitutional:  Negative for chills and fever. Respiratory:  Positive for shortness of breath. Negative for cough. Cardiovascular:  Negative for chest pain and palpitations. Gastrointestinal:  Negative for nausea and vomiting. Musculoskeletal:  Positive for arthralgias. Negative for myalgias. Visit Vitals  /63 (Site: Left Upper Arm, Position: Sitting, Cuff Size: Medium Adult)   Pulse 72   Temp 97.1 °F (36.2 °C) (Temporal)   Ht 5' 2.5\" (1.588 m)   Wt 178 lb (80.7 kg)   SpO2 94%   BMI 32.04 kg/m²     Physical Exam  Constitutional:       General: She is not in acute distress. Appearance: Normal appearance. She is not ill-appearing. Eyes:      Extraocular Movements: Extraocular movements intact. Conjunctiva/sclera: Conjunctivae normal.   Pulmonary:      Effort: Pulmonary effort is normal.   Musculoskeletal:         General: Normal range of motion. Cervical back: Normal range of motion. No rigidity.    Neurological: General: No focal deficit present. Mental Status: She is alert. Mental status is at baseline. Psychiatric:         Mood and Affect: Mood normal.         Behavior: Behavior normal.         Thought Content: Thought content normal.         Judgment: Judgment normal.     Assessment/Plan:  1. Routine general medical examination at a health care facility  -     TSH with Reflex; Future  -     Homocysteine, Plasma; Future  -     Lipid Panel; Future  -     Comprehensive Metabolic Panel; Future  -     CBC with Auto Differential; Future  2. Type 2 diabetes mellitus with stage 3 chronic kidney disease, without long-term current use of insulin, unspecified whether stage 3a or 3b CKD (HCC)  Overview:  Using patient-friendly terms, spoke about key concepts such as hyperglycemia, insulin resistance and intramyocellular lipid accumulation; we reviewed the implications of these processes when it comes to diabetes mellitus management. Patient appreciative of the knowledge imparted today. Orders:  -     Microalbumin / Creatinine Urine Ratio; Future  -     Hemoglobin A1C; Future  3. Ovary cancer, unspecified laterality [facility])  Assessment & Plan:  Continue to follow the recommendations by Oncology specialist.  4. Primary hypertension  Overview:  Stable on current medications. Encourage medication adherence and a low-sodium diet. She was instructed on what exactly constitutes low-sodium food items. 5. Vitamin D deficiency  -     Vitamin D 25 Hydroxy; Future  6. Stage 3 chronic kidney disease, unspecified whether stage 3a or 3b CKD ([facility] [address]  Assessment & Plan:  Ordered laboratory work on first visit at [facility]. Orders:  -     PTH, Intact; Future  7. Chronic obstructive pulmonary disease, unspecified COPD type ([facility] [address]  Overview:  On Anoro and albuterol. Stable according to the patient. Will refer to PFT.   8. Chronic pain of both knees  Overview:  Patient recommended to use simple analgesics a the minimal effective dose and to use topical remedies such as muscle relief creams and lidocaine transdermal patches. 9. Needs flu shot  -     Influenza, FLUAD, (age 72 y+), IM, Preservative Free, 0.5 mL  10. Need for shingles vaccine  -     zoster recombinant adjuvanted vaccine [facility]) 50 MCG/0.5ML SUSR injection; Inject 0.5 mLs into the muscle See Admin Instructions 1 dose now and repeat in 2-6 months, Disp-0.5 mL, R-0Normal    Return in about 2 weeks (around 1/31/2023). On this date 1/17/2023 I have spent 60 minutes on patient care-related activities, including prior record review, laboratory work interpretation, the face to face encounter, history taking, physical exam and discussing the diagnoses and importance of compliance with the treatment plan as well as documenting on the day of the visit.     Rosalinda Powell MD

## 2023-01-17 NOTE — PROGRESS NOTES
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 who presents today for scheduled visit. Elevated CA-125 at dx. Possible recent hx GI perf/diverticulitis?, imaging 3/2022. Only sx was rectal bleeding, resolved. 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 Rufina Gilford, on nocturnal home  O2), NIDDM, htn, OA, right ankle surgery after fracture, hospitalization 2/8/22 for Covid-19 infection, UTI, dehydration. More recently seen by them and underwent PET scan after a more recent CT abd (reportedly done for abdominal discomfort and blood per rectum) reportedly had shown abdominal mass. PET scan revealed hypermetabolic multiple abdominal masses as well as right  cardiophrenic mass concerning for malignancy including lymphoma. A hypermetabolic 4.1 cm fluid and gas containing collection located at rectosigmoid junction and uterine fundus concerning for possible abscess also noted. A mildly hypermetabolic consolidative  appearing opacities in the right greater than left lung most consistent with subacute infection/inflammatory process also noted. Denies any recent fevers or drenching night sweats, appears non-toxic. 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 She has no GI//PULM/CV/Neuro complaints today. She sates she is tolerating chemotherapy well.  Pain is controlled with tramadol. Past Medical History     Past Medical History:   Diagnosis Date    Asthma     COPD (chronic obstructive pulmonary disease) (HCC)     Depression     Diabetes ([facility] [address]     Hypertension         Past Surgical History     Past Surgical History:   Procedure Laterality Date    ANKLE FRACTURE SURGERY      ankle broken    IR PORT PLACEMENT EQUAL OR GREATER THAN 5 YEARS  5/11/2022    IR PORT PLACEMENT EQUAL OR GREATER THAN 5 YEARS  5/11/2022    IR PORT PLACEMENT EQUAL OR GREATER THAN 5 YEARS 5/11/2022 [facility] RADIOLOGY SPECIALS    US ABDOMINAL MASS BIOPSY PERCUTANEOUS  4/28/2022    US ABDOMINAL MASS BIOPSY PERCUTANEOUS 4/28/2022 [facility] RADIOLOGY US        Medications      Current Outpatient Medications:     vitamin C (ASCORBIC ACID) 500 MG tablet, Take 500 mg by mouth daily, Disp: , Rfl:     Multiple Vitamin (MULTIVITAMIN ADULT PO), Take by mouth, Disp: , Rfl:     Niraparib Tosylate (ZEJULA) 100 MG CAPS, Take 200 mg by mouth daily, Disp: 60 capsule, Rfl: 11    ondansetron (ZOFRAN-ODT) 4 MG disintegrating tablet, , Disp: , Rfl:     albuterol sulfate HFA (PROVENTIL;VENTOLIN;PROAIR) 108 (90 Base) MCG/ACT inhaler, INHALE 2 PUFFS BY MOUTH EVERY 6 HOURS AS NEEDED, Disp: , Rfl:     aspirin 81 MG chewable tablet, Take 81 mg by mouth daily, Disp: , Rfl:     calcium carbonate-vitamin D 600-200 MG-UNIT TABS, Take 2 tablets by mouth daily, Disp: , Rfl:     glimepiride (AMARYL) 1 MG tablet, Take 1 mg by mouth daily, Disp: , Rfl:     lisinopril-hydroCHLOROthiazide (PRINZIDE;ZESTORETIC) 10-12.5 MG per tablet, Take 1 tablet by mouth daily, Disp: , Rfl:     LORazepam (ATIVAN) 0.5 MG tablet, Take 0.5 mg by mouth every 8 hours as needed.  , Disp: , Rfl:     umeclidinium-vilanterol (ANORO ELLIPTA) 62.5-25 MCG/INH AEPB inhaler, INHALE 1 PUFF BY MOUTH EVERY DAY, Disp: , Rfl:     zoster recombinant adjuvanted vaccine (SHINGRIX) 50 MCG/0.5ML SUSR injection, Inject 0.5 mLs into the muscle See Admin Instructions 1 dose now and repeat in 2-6 months, Disp: 0.5 mL, Rfl: 0    cyanocobalamin 500 MCG tablet, Take 500 mcg by mouth daily, Disp: , Rfl:      Allergies   Metformin and Penicillins    Social History     Social History       Tobacco History       Smoking Status  Former      Smokeless Tobacco Use  Never              Alcohol History       Alcohol Use Status  Never              Drug Use       Drug Use Status  Never              Sexual Activity       Sexually Active  Not Asked                    Family History     Family History   Problem Relation Age of Onset    Colon Cancer Sister     Cancer Sister         Bladder    Cancer Brother         bladder       Review of Systems   Review of Systems   Constitutional: Negative. Negative for fatigue. HENT:  Positive for postnasal drip. Eyes: Negative. Respiratory: Negative. Cardiovascular: Negative. Gastrointestinal: Negative. Endocrine: Negative. Genitourinary: Negative. Musculoskeletal:  Positive for arthralgias and myalgias. Skin: Negative. Allergic/Immunologic: Negative. Neurological: Negative. Hematological: Negative. Psychiatric/Behavioral: Negative. All other systems reviewed and are negative. Physical Exam     ECOG PERFORMANCE STATUS - 1  Pain - /10. Mild to moderate pain, requiring medication - see MAR     Fatigue - No flowsheet data found. Distress - No flowsheet data found. Blood pressure 131/60, pulse 62, temperature 98.3 °F (36.8 °C), resp. rate 16, height 5' 2.5\" (1.588 m), weight 182 lb (82.6 kg), SpO2 95 %. Physical Exam  Vitals and nursing note reviewed. Exam conducted with a chaperone present. Constitutional:       Appearance: Normal appearance. HENT:      Head: Normocephalic and atraumatic. Nose: No congestion. Cardiovascular:      Rate and Rhythm: Normal rate and regular rhythm. Pulses: Normal pulses. Heart sounds: Normal heart sounds.    Pulmonary:      Effort: Pulmonary effort is normal. No respiratory distress. Breath sounds: No stridor. No wheezing or rhonchi. Abdominal:      General: Abdomen is flat. There is no distension. Palpations: Abdomen is soft. There is no mass. Musculoskeletal:      Cervical back: Normal range of motion and neck supple. Skin:     General: Skin is warm and dry. Neurological:      General: No focal deficit present. Mental Status: She is alert and oriented to person, place, and time. Psychiatric:         Mood and Affect: Mood normal.         Behavior: Behavior normal.         Thought Content:  Thought content normal.         Judgment: Judgment normal.       Labs        Recent Results (from the past 48 hour(s))   CBC with Auto Differential    Collection Time: 01/17/23 12:26 PM   Result Value Ref Range    WBC 11.0 4.3 - 11.1 K/uL    RBC 3.48 (L) 4.05 - 5.2 M/uL    Hemoglobin 11.3 (L) 11.7 - 15.4 g/dL    Hematocrit 35.9 35.8 - 46.3 %    .2 (H) 82 - 102 FL    MCH 32.5 26.1 - 32.9 PG    MCHC 31.5 31.4 - 35.0 g/dL    RDW 14.1 11.9 - 14.6 %    Platelets 954 984 - 187 K/uL    MPV 9.2 (L) 9.4 - 12.3 FL    nRBC 0.00 0.0 - 0.2 K/uL    Differential Type AUTOMATED      Seg Neutrophils 70 43 - 78 %    Lymphocytes 18 13 - 44 %    Monocytes 9 4.0 - 12.0 %    Eosinophils % 1 0.5 - 7.8 %    Basophils 1 0.0 - 2.0 %    Immature Granulocytes 1 0.0 - 5.0 %    Segs Absolute 7.7 1.7 - 8.2 K/UL    Absolute Lymph # 2.0 0.5 - 4.6 K/UL    Absolute Mono # 1.0 0.1 - 1.3 K/UL    Absolute Eos # 0.2 0.0 - 0.8 K/UL    Basophils Absolute 0.1 0.0 - 0.2 K/UL    Absolute Immature Granulocyte 0.1 0.0 - 0.5 K/UL   Comprehensive Metabolic Panel    Collection Time: 01/17/23 12:26 PM   Result Value Ref Range    Sodium 136 133 - 143 mmol/L    Potassium 4.6 3.5 - 5.1 mmol/L    Chloride 100 (L) 101 - 110 mmol/L    CO2 25 21 - 32 mmol/L    Anion Gap 11 2 - 11 mmol/L    Glucose 98 65 - 100 mg/dL    BUN 31 (H) 8 - 23 MG/DL    Creatinine 1.80 (H) 0.6 - 1.0 MG/DL    Est, Glom Filt Rate 27 (L) >60 ml/min/1.73m2    Calcium 10.4 8.3 - 10.4 MG/DL    Total Bilirubin 0.7 0.2 - 1.1 MG/DL    ALT 25 12 - 65 U/L    AST 24 15 - 37 U/L    Alk Phosphatase 173 (H) 50 - 136 U/L    Total Protein 7.7 6.3 - 8.2 g/dL    Albumin 3.7 3.2 - 4.6 g/dL    Globulin 4.0 2.8 - 4.5 g/dL    Albumin/Globulin Ratio 0.9 0.4 - 1.6     Lipid Panel    Collection Time: 01/17/23 12:26 PM   Result Value Ref Range    Cholesterol, Total 167 <200 MG/DL    Triglycerides 241 (H) 35 - 150 MG/DL    HDL 40 40 - 60 MG/DL    LDL Calculated 78.8 <100 MG/DL    VLDL Cholesterol Calculated 48.2 (H) 6.0 - 23.0 MG/DL    Chol/HDL Ratio 4.2     Vitamin D 25 Hydroxy    Collection Time: 01/17/23 12:26 PM   Result Value Ref Range    Vit D, 25-Hydroxy 25.6 (L) 30.0 - 100.0 ng/mL   TSH with Reflex    Collection Time: 01/17/23 12:26 PM   Result Value Ref Range    TSH w Free Thyroid if Abnormal 2.45 0.358 - 3.740 UIU/ML   Hemoglobin A1C    Collection Time: 01/17/23 12:26 PM   Result Value Ref Range    Hemoglobin A1C 5.9 (H) 4.8 - 5.6 %    eAG 123 mg/dL   Magnesium    Collection Time: 01/18/23  1:49 PM   Result Value Ref Range    Magnesium 2.2 1.8 - 2.4 mg/dL   Cancer Antigen 125    Collection Time: 01/18/23  1:49 PM   Result Value Ref Range     22 1.5 - 35.0 U/mL          Lab Results   Component Value Date     22 01/18/2023      Component      Latest Ref Rng & Units 9/21/2022 8/31/2022 8/10/2022 7/20/2022           9:43 AM  8:50 AM  9:29 AM  9:15 AM   ,C125      1.5 - 35.0 U/mL 30 34 56 (H) 163 (H)     Component      Latest Ref Rng & Units 6/29/2022 6/8/2022 5/18/2022          10:09 AM 12:34 PM  1:25 PM   ,C125      1.5 - 35.0 U/mL 546 (H) 2479 (H) 3,987 (H)         Pathology    Name:    Fredrick Vizcarra   Accession Number:   [id number]   MR #   [id number]   Date Obtained:   4/28/2022              \"ABDOMINAL MASS\":  ADENOCARCINOMA, CONSISTENT WITH MULLERIAN ORIGIN. Sign Out Date: 5/3/2022  Wendy Olivo MD                           [facility]- ER/MN/[id number] BY IHC                                                                                   Status:  Reviewed By:    Marisol Morales. Amy Lira MD on 5/2/2022                                 Interpretation                       [email or web address] Flow Cytometric Analysis     Diagnosis:     No flow immunophenotypic evidence of a lymphoproliferative disorder. See full report in [facility] as errors can occur when results are   imbedded into this report. [facility]-IMMUNOHISTOCHEMISTRY                                                                                   Status:  Reviewed By:    Marisol Morales. Amy Lira MD on 5/3/2022                                 Interpretation                       Immunohistochemical Stain Panel:          Interpretation:  Immunohistochemical findings most consistent with   adenocarcinoma of Müllerian origin. Dr. Kee Lacks concurs.        Antibody/Test               Marker For                              Result   Keratin AE1/AE3             Broad spectrum epithelial marker                  Positive   Cytokeratin 7               Lung, breast, upper GE, serous ovary                  Positive   Cytokeratin 20               Colon, mucinous ovary, urothelium                  Negative   CDX2                    Gastrointestinal carcinomas                       Negative   GATA3               Urothelial, breast carcinoma                       Negative   Calretinin                Mesothelioma                                 Negative   ER                    Estrogen receptor                          Positive   Napsin                     Lung adenocarcinoma                           Negative   [place]-8                    Renal cell carcinoma                           Positive   TTF-1                     Lung and thyroid adenocarcinoma                 Negative   WT-1                    Serous carcinoma, mesothelioma                 Positive     Imaging/Diagnostics Last 24 Hours   No results found. Radiology:    CT Result (most recent):  CT CHEST ABDOMEN PELVIS W CONTRAST 10/21/2022    Narrative  EXAM: CT CHEST, ABDOMEN, AND PELVIS WITH CONTRAST    INDICATION: Malignant neoplasm of unspecified ovary; Encounter for  antineoplastic chemotherapy. COMPARISON: None available    TECHNIQUE:  CT imaging was performed of the chest, abdomen, and pelvis after  intravenous injection of 100 mL Isovue 370. Intravenous contrast was used for  better evaluation of solid organs and vascular structures. Oral contrast was  used for bowel opacification. Coronal reformatted imaging provided. Radiation  dose reduction techniques were used for this study. Our CT scanners use one or  all of the following: Automated exposure control, adjustment of the mA and/or kV  according to patient size, iterative reconstruction. FINDINGS:    CHEST:    Mediastinum and visualized thyroid: Slightly smaller right cardiophrenic nodule. Heart: Normal.    Large Vessels: Normal.    Pleura: Normal.    Lungs: No discrete lung nodule. .    Airways: Normal.    ABDOMEN AND PELVIS:    Liver: Normal in size and morphology. No focal lesions. Gallbladder/biliary: Gallstone present. Pancreas: Normal.    Spleen: Normal.    Adrenals: Normal.    Kidneys: No focal lesion or hydronephrosis. Bladder: Normal.    Pelvic organs: Normal uterus. No adnexal mass. Gastrointestinal: Normal.    Peritoneum/retroperitoneum: 1.8 x 1.6 cm mass anteriorly in the peritoneal  cavity previously measured 3.1 x 2.4 cm. Left mesenteric mass measures 0.9 x 0.7  cm, previously 1.7 x 1.3 cm. No enlarging masses. Mass adjacent to the head of  the pancreas is not well seen and not measurable on the current study. Lymph nodes: Left para-aortic lymph node is smaller, currently 1.6 x 0.9 cm,  previously 2.1 x 1.4 cm. Small perigastric node. .    Vessels: Severe aortoiliac atherosclerotic disease. Bones/Soft tissues: No aggressive bone lesions.  Multilevel degenerative changes  of the spine. No chest wall abnormality discerned on current study. No  aggressive appearing bone lesions. Multilevel degenerative changes of the spine. Impression  Left periaortic lymph node is smaller compared to prior. PET Results (most recent):  PET CT SKULL BASE TO MID THIGH 03/29/2022    Narrative  PET/CT    Indication: Abdominal mass on outside CT    Radiopharmaceutical: 14.4 mCi F18-FDG, intravenously. Technique: Imaging was performed from the skull through the proximal thighs  using routine PET/CT acquisition protocol. Imaging was performed approximately  60 minutes post injection. Oral contrast was administered. Radiation dose  reduction techniques were used for this study:  Our CT scanners use one or all  of the following: Automated exposure control, adjustment of the mA and/or kVp  according to patient's size, iterative reconstruction. Serum glucose: 93 mg/dL prior to injection. Comparison studies: None available. Findings:    Head and Neck: No enlarged or hypermetabolic lymph nodes within the neck. Chest: A lymph node in the right cardiophrenic fat is hypermetabolic, measuring  1.6 cm in maximum SUV of 10.9. Hypermetabolic nodular and slightly consolidative appearing opacities in the  posterior right upper and lower lobes and to a lesser extent the left lower  lobe. Abdomen/Pelvis: Hypermetabolic solid masses throughout the abdomen index as  follows:  Peripancreatic/periportal mass measuring 3.7 cm, maximum SUV 20.3.  3.2 cm mass medially to the gastric fundus, maximum SUV 13.2  Left para-aortic mass measuring 2.6 cm, maximum SUV 15.6. Omental mass measuring 3.5 cm, maximum SUV 13.3. Left mesenteric mass measuring 2.8 cm, maximum SUV 16.7. Right lower quadrant mass measuring 8.9 x 5.1 cm, maximum SUV 19.5. Right pelvic sidewall mass measuring 6.4 x 5.0 cm, axillary SUV 26.     Hypermetabolic gas and fluid containing collection measuring 4.1 cm adjacent to  the sigmoid colon contacting the uterine fundus. Impression  1. Hypermetabolic right cardiophrenic and abdominal masses as above concerning  for malignancy such as lymphoma. 2.  Hypermetabolic 4.1 cm fluid and gas-containing collection located between  the rectosigmoid junction and uterine fundus concerning for a possible abscess  or pyomyoma. Recommend correlation for any evidence of infection. 3.  Mildly hypermetabolic nodular and slightly consolidative appearing opacities  in the right greater than left lungs appearance most consistent with a subacute  infectious/inflammatory process. The study was referred to the imaging navigator to expedite delivery of results. 12    Mammo Results (most recent):  No results found for this or any previous visit from the past 365 days. US Result (most recent):  US ABDOMINAL MASS BIOPSY PERCUTANEOUS 04/28/2022    Narrative  History: Abdominal masses    Consent: Informed written and oral consent was obtained from the patient after  explanation of benefits and risks (including, but not limited to: Infection,  Hemorrhage, and Visceral injury) of procedure. The patient's questions were  answered and requested that we proceed. Procedure:  Sterile barrier technique (including:  cap, mask, sterile gloves,  sterile sheet, hand hygiene, and chlorhexidene for cutaneous antisepsis) were  used. Following routine prep and drape of the upper abdomen, a local field  block with 1% lidocaine was achieved. Using real-time ultrasound guidance, a 17  gauge needle was advanced into the superficial aspect of the left upper quadrant  abdominal mass. Through this base needle, 4 18 gauge core biopsies were  performed. The postprocedure ultrasound demonstrates no evidence of hematoma. Complications: None.     Medications:  Under physician supervision, intraservice time of 17 minutes of  moderate intravenous conscious sedation was performed by administering Versed,  Fentanyl, intravenously. Continuous pulse oximetry, heart rate, and blood  pressure monitoring was performed with an independent, trained observer present. Impression  Technically successful ultrasound guided left upper quadrant  abdominal mass biopsy. The gas and fluid filled focus in the pelvis is not  amenable to percutaneous drainage or biopsy. Specimen: Sent to cytopathology. Plan: The patient will recover for approximately 1 hour         Assessment       Diagnosis Orders   1. Ovary cancer, unspecified laterality ([facility] [address]  CBC with Auto Differential    Comprehensive Metabolic Panel      2. Encounter for antineoplastic chemotherapy        3.  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 [place] [address]  Office : [phone]  Fax : [phone]

## 2023-01-18 ENCOUNTER — HOSPITAL ENCOUNTER (OUTPATIENT)
Dept: INFUSION THERAPY | Age: 88
Discharge: HOME | End: 2023-01-18
Payer: MEDICARE

## 2023-01-18 ENCOUNTER — OFFICE VISIT (OUTPATIENT)
Dept: ONCOLOGY | Age: 88
End: 2023-01-18
Payer: MEDICARE

## 2023-01-18 VITALS
TEMPERATURE: 98.3 F | RESPIRATION RATE: 16 BRPM | DIASTOLIC BLOOD PRESSURE: 60 MMHG | BODY MASS INDEX: 32.25 KG/M2 | HEART RATE: 62 BPM | SYSTOLIC BLOOD PRESSURE: 131 MMHG | HEIGHT: 63 IN | WEIGHT: 182 LBS | OXYGEN SATURATION: 95 %

## 2023-01-18 DIAGNOSIS — C56.9 OVARY CANCER, UNSPECIFIED LATERALITY (HCC): Primary | ICD-10-CM

## 2023-01-18 DIAGNOSIS — Z51.11 ENCOUNTER FOR ANTINEOPLASTIC CHEMOTHERAPY: ICD-10-CM

## 2023-01-18 DIAGNOSIS — N18.30 STAGE 3 CHRONIC KIDNEY DISEASE, UNSPECIFIED WHETHER STAGE 3A OR 3B CKD (HCC): ICD-10-CM

## 2023-01-18 DIAGNOSIS — N18.30 TYPE 2 DIABETES MELLITUS WITH STAGE 3 CHRONIC KIDNEY DISEASE, WITHOUT LONG-TERM CURRENT USE OF INSULIN, UNSPECIFIED WHETHER STAGE 3A OR 3B CKD (HCC): ICD-10-CM

## 2023-01-18 DIAGNOSIS — C56.9 OVARY CANCER, UNSPECIFIED LATERALITY (HCC): ICD-10-CM

## 2023-01-18 DIAGNOSIS — E11.22 TYPE 2 DIABETES MELLITUS WITH STAGE 3 CHRONIC KIDNEY DISEASE, WITHOUT LONG-TERM CURRENT USE OF INSULIN, UNSPECIFIED WHETHER STAGE 3A OR 3B CKD (HCC): ICD-10-CM

## 2023-01-18 DIAGNOSIS — D68.8 OTHER SPECIFIED COAGULATION DEFECTS (HCC): ICD-10-CM

## 2023-01-18 LAB
25(OH)D3 SERPL-MCNC: 25.6 NG/ML (ref 30–100)
CANCER AG125 SERPL-ACNC: 22 U/ML (ref 1.5–35)
EST. AVERAGE GLUCOSE BLD GHB EST-MCNC: 123 MG/DL
HBA1C MFR BLD: 5.9 % (ref 4.8–5.6)
MAGNESIUM SERPL-MCNC: 2.2 MG/DL (ref 1.8–2.4)

## 2023-01-18 PROCEDURE — 3078F DIAST BP <80 MM HG: CPT | Performed by: OBSTETRICS & GYNECOLOGY

## 2023-01-18 PROCEDURE — G8417 CALC BMI ABV UP PARAM F/U: HCPCS | Performed by: OBSTETRICS & GYNECOLOGY

## 2023-01-18 PROCEDURE — 1036F TOBACCO NON-USER: CPT | Performed by: OBSTETRICS & GYNECOLOGY

## 2023-01-18 PROCEDURE — 96523 IRRIG DRUG DELIVERY DEVICE: CPT

## 2023-01-18 PROCEDURE — 1090F PRES/ABSN URINE INCON ASSESS: CPT | Performed by: OBSTETRICS & GYNECOLOGY

## 2023-01-18 PROCEDURE — 83735 ASSAY OF MAGNESIUM: CPT

## 2023-01-18 PROCEDURE — G8484 FLU IMMUNIZE NO ADMIN: HCPCS | Performed by: OBSTETRICS & GYNECOLOGY

## 2023-01-18 PROCEDURE — 1123F ACP DISCUSS/DSCN MKR DOCD: CPT | Performed by: OBSTETRICS & GYNECOLOGY

## 2023-01-18 PROCEDURE — 2580000003 HC RX 258: Performed by: OBSTETRICS & GYNECOLOGY

## 2023-01-18 PROCEDURE — G8427 DOCREV CUR MEDS BY ELIG CLIN: HCPCS | Performed by: OBSTETRICS & GYNECOLOGY

## 2023-01-18 PROCEDURE — G8400 PT W/DXA NO RESULTS DOC: HCPCS | Performed by: OBSTETRICS & GYNECOLOGY

## 2023-01-18 PROCEDURE — 99214 OFFICE O/P EST MOD 30 MIN: CPT | Performed by: OBSTETRICS & GYNECOLOGY

## 2023-01-18 PROCEDURE — 86304 IMMUNOASSAY TUMOR CA 125: CPT

## 2023-01-18 PROCEDURE — 3074F SYST BP LT 130 MM HG: CPT | Performed by: OBSTETRICS & GYNECOLOGY

## 2023-01-18 RX ORDER — SODIUM CHLORIDE 0.9 % (FLUSH) 0.9 %
5-40 SYRINGE (ML) INJECTION PRN
Status: DISCONTINUED | OUTPATIENT
Start: 2023-01-18 | End: 2023-01-19 | Stop reason: HOSPADM

## 2023-01-18 RX ADMIN — Medication 10 ML: at 14:02

## 2023-01-18 ASSESSMENT — ANXIETY QUESTIONNAIRES
IF YOU CHECKED OFF ANY PROBLEMS ON THIS QUESTIONNAIRE, HOW DIFFICULT HAVE THESE PROBLEMS MADE IT FOR YOU TO DO YOUR WORK, TAKE CARE OF THINGS AT HOME, OR GET ALONG WITH OTHER PEOPLE: NOT DIFFICULT AT ALL
GAD7 TOTAL SCORE: 0
4. TROUBLE RELAXING: 0
7. FEELING AFRAID AS IF SOMETHING AWFUL MIGHT HAPPEN: 0
5. BEING SO RESTLESS THAT IT IS HARD TO SIT STILL: 0
2. NOT BEING ABLE TO STOP OR CONTROL WORRYING: 0
6. BECOMING EASILY ANNOYED OR IRRITABLE: 0
1. FEELING NERVOUS, ANXIOUS, OR ON EDGE: 0
3. WORRYING TOO MUCH ABOUT DIFFERENT THINGS: 0

## 2023-01-18 ASSESSMENT — PATIENT HEALTH QUESTIONNAIRE - PHQ9
9. THOUGHTS THAT YOU WOULD BE BETTER OFF DEAD, OR OF HURTING YOURSELF: 0
SUM OF ALL RESPONSES TO PHQ9 QUESTIONS 1 & 2: 0
7. TROUBLE CONCENTRATING ON THINGS, SUCH AS READING THE NEWSPAPER OR WATCHING TELEVISION: 0
2. FEELING DOWN, DEPRESSED OR HOPELESS: 0
SUM OF ALL RESPONSES TO PHQ QUESTIONS 1-9: 2
4. FEELING TIRED OR HAVING LITTLE ENERGY: 2
8. MOVING OR SPEAKING SO SLOWLY THAT OTHER PEOPLE COULD HAVE NOTICED. OR THE OPPOSITE, BEING SO FIGETY OR RESTLESS THAT YOU HAVE BEEN MOVING AROUND A LOT MORE THAN USUAL: 0
SUM OF ALL RESPONSES TO PHQ QUESTIONS 1-9: 2
5. POOR APPETITE OR OVEREATING: 0
1. LITTLE INTEREST OR PLEASURE IN DOING THINGS: 0
3. TROUBLE FALLING OR STAYING ASLEEP: 0
6. FEELING BAD ABOUT YOURSELF - OR THAT YOU ARE A FAILURE OR HAVE LET YOURSELF OR YOUR FAMILY DOWN: 0

## 2023-01-18 NOTE — PATIENT INSTRUCTIONS
Patient Instructions from Today's Visit    Reason for Visit:  Follow up    Diagnosis Information:  [email or web address]      Plan:      Follow Up:  2 months    Recent Lab Results:  Hospital Outpatient Visit on 01/18/2023   Component Date Value Ref Range Status    Magnesium 01/18/2023 2.2  1.8 - 2.4 mg/dL Final   Orders Only on 01/18/2023   Component Date Value Ref Range Status    Hemoglobin A1C 01/17/2023 5.9 (A)  4.8 - 5.6 % Final    eAG 01/17/2023 123  mg/dL Final    Comment: Reference Range  Normal: 4.8-5.6  Diabetic >=6.5%  Normal       <5.7%          Treatment Summary has been discussed and given to patient: n/a        -------------------------------------------------------------------------------------------------------------------    Patient does express an interest in My Chart. My Chart log in information explained on the after visit summary printout at the [facility] [employer] desk.     Izabella Swanson, RN, MSN, OCN  Gynecology Nurse Navigator for Dr. Marie Martin  [address] [place] [address]  Phone:  [phone]  Fax: [phone]  Email: [email or web address]

## 2023-01-18 NOTE — PROGRESS NOTES
Patient arrived to port lab ambulatory. Port accessed and labs drawn per protocol. Port deaccessed and patient discharged to office.

## 2023-01-19 PROBLEM — D68.8 OTHER SPECIFIED COAGULATION DEFECTS (HCC): Status: ACTIVE | Noted: 2023-01-19

## 2023-01-19 LAB — HCYS SERPL-SCNC: 14.2 UMOL/L (ref 0–21.3)

## 2023-01-26 ENCOUNTER — TELEPHONE (OUTPATIENT)
Dept: ONCOLOGY | Age: 88
End: 2023-01-26

## 2023-01-26 NOTE — TELEPHONE ENCOUNTER
PT tomeka Oliveros called inregards to a form that a pharmacy faxed to us on 1/25/23/states is it regarding PT medication coverage/was unaware they needed to reapply each year

## 2023-01-27 NOTE — PROGRESS NOTES
Faxed renewal application for [place]  [facility] [place], awaiting determination.

## 2023-01-30 NOTE — PROGRESS NOTES
[facility] application has been received , awaiting determination.

## 2023-01-30 NOTE — PROGRESS NOTES
Per [facility] application has been approved for [employer] .

## 2023-02-02 ENCOUNTER — OFFICE VISIT (OUTPATIENT)
Dept: INTERNAL MEDICINE CLINIC | Facility: CLINIC | Age: 88
End: 2023-02-02

## 2023-02-02 VITALS
TEMPERATURE: 98 F | HEART RATE: 99 BPM | SYSTOLIC BLOOD PRESSURE: 117 MMHG | RESPIRATION RATE: 16 BRPM | OXYGEN SATURATION: 96 % | WEIGHT: 179.8 LBS | HEIGHT: 63 IN | BODY MASS INDEX: 31.86 KG/M2 | DIASTOLIC BLOOD PRESSURE: 55 MMHG

## 2023-02-02 DIAGNOSIS — Z71.3 ENCOUNTER FOR NUTRITIONAL COUNSELING: ICD-10-CM

## 2023-02-02 DIAGNOSIS — I10 PRIMARY HYPERTENSION: ICD-10-CM

## 2023-02-02 DIAGNOSIS — R35.0 URINARY FREQUENCY: ICD-10-CM

## 2023-02-02 DIAGNOSIS — E11.22 TYPE 2 DIABETES MELLITUS WITH STAGE 3B CHRONIC KIDNEY DISEASE, WITHOUT LONG-TERM CURRENT USE OF INSULIN (HCC): Chronic | ICD-10-CM

## 2023-02-02 DIAGNOSIS — N18.30 STAGE 3 CHRONIC KIDNEY DISEASE, UNSPECIFIED WHETHER STAGE 3A OR 3B CKD (HCC): Primary | ICD-10-CM

## 2023-02-02 DIAGNOSIS — N18.32 TYPE 2 DIABETES MELLITUS WITH STAGE 3B CHRONIC KIDNEY DISEASE, WITHOUT LONG-TERM CURRENT USE OF INSULIN (HCC): Chronic | ICD-10-CM

## 2023-02-02 LAB
BILIRUBIN, URINE, POC: NEGATIVE
BLOOD URINE, POC: NORMAL
GLUCOSE URINE, POC: NEGATIVE
KETONES, URINE, POC: NEGATIVE
LEUKOCYTE ESTERASE, URINE, POC: NORMAL
NITRITE, URINE, POC: NORMAL
PH, URINE, POC: 5 (ref 4.6–8)
PROTEIN,URINE, POC: NORMAL
SPECIFIC GRAVITY, URINE, POC: 1.02 (ref 1–1.03)
URINALYSIS CLARITY, POC: CLEAR
URINALYSIS COLOR, POC: YELLOW
UROBILINOGEN, POC: 0.2

## 2023-02-02 RX ORDER — LISINOPRIL 10 MG/1
10 TABLET ORAL DAILY
Qty: 90 TABLET | Refills: 1 | Status: SHIPPED | OUTPATIENT
Start: 2023-02-02

## 2023-02-02 RX ORDER — ACETAMINOPHEN 500 MG
500 TABLET ORAL EVERY 6 HOURS PRN
COMMUNITY

## 2023-02-02 ASSESSMENT — PATIENT HEALTH QUESTIONNAIRE - PHQ9
SUM OF ALL RESPONSES TO PHQ QUESTIONS 1-9: 0
1. LITTLE INTEREST OR PLEASURE IN DOING THINGS: 0
2. FEELING DOWN, DEPRESSED OR HOPELESS: 0
SUM OF ALL RESPONSES TO PHQ QUESTIONS 1-9: 0
SUM OF ALL RESPONSES TO PHQ9 QUESTIONS 1 & 2: 0
SUM OF ALL RESPONSES TO PHQ QUESTIONS 1-9: 0
SUM OF ALL RESPONSES TO PHQ QUESTIONS 1-9: 0

## 2023-02-02 NOTE — PROGRESS NOTES
Chief Complaint   Patient presents with    Follow-up     Pt presents to the office today for a 2-4 week follow-up    Discuss Labs     Abdi Beltran did labs and wanted her to discuss the labs with her pcp to determine the cause of the kidney failure. He felt like the chemo wasn't causing this felt like this was coming from something else    Altered Mental Status     Was having some confusion and wasn't able to focus earlier this week; frequency;family concerned she may have a uti      HPI  The patient comes to the clinic accompanied by her friend, Kayla Guardado and her . Ms. Zulma Suazo denies any acute signs or symptoms at this time. However Ms. Kayla Guardado mentions that earlier this week she displayed a period of confusion that spontaneously improved later on that day. There is no report of irritative voiding symptoms such as dysuria, urinary urgency, new urinary frequency, or hematuria; make symptoms such as fever, chills, or diaphoresis    Ms. Marroquin's  explains that over the last week there were two low blood pressure readings, with values as low as 90/60 mmHg. She does not keep a blood pressure log with blood pressure values taken daily. The patient has been adherent to all her medications. The patient would like to explore the issue of chronic kidney disease. The patient is being managed by oncology specialist for ovarian cancer. Together, we reviewed her most recent laboratory results. Most likely reasons leading to chronic kidney disease were discussed along with the patient, and ways to improve this condition were suggested to her . Ms. uZlma Suazo and her friends were very appreciative of the time devoted to this issue today. Reviewed and updated this visit by provider:  Tobacco  Allergies  Meds  Problems  Med Hx  Surg Hx  Fam Hx       Review of Systems   Constitutional:  Negative for chills and fever. Respiratory:  Negative for cough and wheezing.     Cardiovascular:  Negative for chest pain and palpitations. Genitourinary:  Negative for dysuria, frequency, hematuria and urgency. Musculoskeletal:  Positive for arthralgias, back pain and gait problem. Visit Vitals  BP (!) 117/55 (Site: Right Upper Arm, Position: Sitting, Cuff Size: Medium Adult)   Pulse 99   Temp 98 °F (36.7 °C) (Temporal)   Resp 16   Ht 5' 2.5\" (1.588 m)   Wt 179 lb 12.8 oz (81.6 kg)   SpO2 96%   BMI 32.36 kg/m²     Physical Exam  Constitutional:       General: She is not in acute distress. Appearance: Normal appearance. She is not ill-appearing. Eyes:      Extraocular Movements: Extraocular movements intact. Conjunctiva/sclera: Conjunctivae normal.   Pulmonary:      Effort: Pulmonary effort is normal.   Musculoskeletal:      Cervical back: Normal range of motion. No rigidity. Comments: Uses walker to ambulate with some difficulty. Neurological:      General: No focal deficit present. Mental Status: She is alert. Mental status is at baseline. Psychiatric:         Mood and Affect: Mood normal.         Behavior: Behavior normal.         Thought Content: Thought content normal.         Judgment: Judgment normal.     Assessment/Plan:  1. Stage 3 chronic kidney disease, unspecified whether stage 3a or 3b CKD ([facility] [address]  Assessment & Plan:  Serum creatinine levels baseline as compared to similar values on April 2022. Patient was explained that the most likely reason for chronic kidney disease is a combination of factors, which include but are not limited to  microvascular disease as a result of chronic systemic hypertension and diabetes mellitus. 2. Primary hypertension  Overview:  Encourage medication adherence and a low-sodium diet. She was instructed on what exactly constitutes low-sodium food items. Assessment & Plan:  Patient was advised to create a blood pressure log with values obtained daily and to notify the clinic if hypotension happens with frequency.   Her antihypertensive medicine combination might need its dose lowered. Patient and her caregivers understood and agreed. 3. Type 2 diabetes mellitus with stage 3b chronic kidney disease, without long-term current use of insulin (HCC)  Overview:  Using patient-friendly terms, spoke about key concepts such as hyperglycemia, insulin resistance and intramyocellular lipid accumulation; we reviewed the implications of these processes when it comes to diabetes mellitus management. Patient appreciative of the knowledge imparted. Assessment & Plan:  Patient encouraged to focus on holistic health rather than on one specific organ system problem such as CKD. She understood and agreed with this approach and is already making efforts into therapeutic lifestyle change. 4. Urinary frequency  Comments:  Not new according to her. No clinical evidence of UTI. Patient agreed with withholding antibiotics; will contact the office if she develop urinary problems. Orders:  -     AMB POC URINALYSIS DIP STICK AUTO W/O MICRO  5. Encounter for nutritional counseling  The patient has been instructed on the multiple benefits of healthy eating, which include but are not limited to reduction in the risk of cardiovascular diseases such as heart attacks and stroke, as well as improving renal vascular health. A whole food, plant-based nutrition plan provides food items that are inherently low in calories, cholesterol, saturated fat and dietary sodium while being high in nutrients and fiber. Patient appreciative of the instruction provided. Return in about 3 months (around 5/2/2023). On this date 2/2/2023 I have spent 50 minutes on patient care-related activities, including prior record review, laboratory work interpretation, the face to face encounter, history taking, physical exam and discussing the diagnoses and importance of compliance with the treatment plan as well as documenting on the day of the visit.     Noe Srivastava MD

## 2023-02-04 ASSESSMENT — ENCOUNTER SYMPTOMS
WHEEZING: 0
BACK PAIN: 1
COUGH: 0

## 2023-02-04 NOTE — ASSESSMENT & PLAN NOTE
Patient was advised to create a blood pressure log with values obtained daily and to notify the clinic if hypotension happens with frequency. Her antihypertensive medicine combination might need its dose lowered. Patient and her caregivers understood and agreed.

## 2023-02-04 NOTE — ASSESSMENT & PLAN NOTE
Serum creatinine levels baseline as compared to similar values on April 2022. Patient was explained that the most likely reason for chronic kidney disease is a combination of factors, which include but are not limited to  microvascular disease as a result of chronic systemic hypertension and diabetes mellitus.

## 2023-02-16 ENCOUNTER — TELEPHONE (OUTPATIENT)
Dept: ONCOLOGY | Age: 88
End: 2023-02-16

## 2023-02-16 NOTE — TELEPHONE ENCOUNTER
Pt's niece called saying that orders for blood work are needed to [facility] in [place]. The orders that were originally sent were not signed by Dr. Homer Nuñez. Please call niece when orders have been resent.

## 2023-02-16 NOTE — TELEPHONE ENCOUNTER
Called back to [place] to see if she had a fax number for where the orders needed to go or else a telephone number we can call to get the number. She asked if I could call her back in 10 minutes and she will have some numbers for me  Call back placed to [place]. She supplied the numbers and I have faxed the orders as requested. An [facility] outpatient lab fax number is [phone].  Confirmation received back

## 2023-03-09 DIAGNOSIS — R79.9 ABNORMAL FINDING OF BLOOD CHEMISTRY, UNSPECIFIED: ICD-10-CM

## 2023-03-09 DIAGNOSIS — C56.9 OVARY CANCER, UNSPECIFIED LATERALITY (HCC): Primary | ICD-10-CM

## 2023-03-14 NOTE — PROGRESS NOTES
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 who presents today for scheduled visit. Elevated CA-125 at dx. Possible recent hx GI perf/diverticulitis?, imaging 3/2022. Only sx was rectal bleeding, resolved. 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 Lisbeth Bar, on nocturnal home  O2), NIDDM, htn, OA, right ankle surgery after fracture, hospitalization 2/8/22 for Covid-19 infection, UTI, dehydration. More recently seen by them and underwent PET scan after a more recent CT abd (reportedly done for abdominal discomfort and blood per rectum) reportedly had shown abdominal mass. PET scan revealed hypermetabolic multiple abdominal masses as well as right  cardiophrenic mass concerning for malignancy including lymphoma. A hypermetabolic 4.1 cm fluid and gas containing collection located at rectosigmoid junction and uterine fundus concerning for possible abscess also noted. A mildly hypermetabolic consolidative  appearing opacities in the right greater than left lung most consistent with subacute infection/inflammatory process also noted. Denies any recent fevers or drenching night sweats, appears non-toxic. 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 She has no GI//PULM/CV/Neuro complaints today. She sates she is tolerating chemotherapy well.  Pain is controlled with tramadol.      Past Medical History     Past Medical History:   Diagnosis Date    Asthma     COPD (chronic obstructive pulmonary disease) (HCC)     Depression     Diabetes ([facility] [address]     Hypertension         Past Surgical History     Past Surgical History:   Procedure Laterality Date    ANKLE FRACTURE SURGERY      ankle broken    IR PORT PLACEMENT EQUAL OR GREATER THAN 5 YEARS  5/11/2022    IR PORT PLACEMENT EQUAL OR GREATER THAN 5 YEARS  5/11/2022    IR PORT PLACEMENT EQUAL OR GREATER THAN 5 YEARS 5/11/2022 [facility] SPECIALS    US ABDOMINAL MASS BIOPSY PERCUTANEOUS  4/28/2022    US ABDOMINAL MASS BIOPSY PERCUTANEOUS 4/28/2022 [facility] US        Medications      Current Outpatient Medications:     Multiple Minerals-Vitamins (CALCIUM-MAGNESIUM-ZINC-D3 PO), Take by mouth daily, Disp: , Rfl:     Dextromethorphan-guaiFENesin (MUCUS DM PO), Take by mouth as needed, Disp: , Rfl:     Magnesium Hydroxide (DULCOLAX PO), Take by mouth as needed, Disp: , Rfl:     acetaminophen (TYLENOL) 500 MG tablet, Take 500 mg by mouth every 6 hours as needed for Pain, Disp: , Rfl:     OXYGEN, 2.5 liters at night, Disp: , Rfl:     lisinopril (PRINIVIL;ZESTRIL) 10 MG tablet, Take 1 tablet by mouth daily, Disp: 90 tablet, Rfl: 1    vitamin C (ASCORBIC ACID) 500 MG tablet, Take 500 mg by mouth daily, Disp: , Rfl:     Multiple Vitamin (MULTIVITAMIN ADULT PO), Take by mouth Daily, Disp: , Rfl:     Niraparib Tosylate (ZEJULA) 100 MG CAPS, Take 200 mg by mouth daily, Disp: 60 capsule, Rfl: 11    ondansetron (ZOFRAN-ODT) 4 MG disintegrating tablet, , Disp: , Rfl:     albuterol sulfate HFA (PROVENTIL;VENTOLIN;PROAIR) 108 (90 Base) MCG/ACT inhaler, INHALE 2 PUFFS BY MOUTH EVERY 6 HOURS AS NEEDED, Disp: , Rfl:     cyanocobalamin 500 MCG tablet, Take 2,500 mcg by mouth daily, Disp: , Rfl:     glimepiride (AMARYL) 1 MG tablet, Take 1 mg by mouth daily, Disp: , Rfl:     LORazepam (ATIVAN) 0.5 MG tablet, Take 0.5 mg by mouth every 8 hours as needed. , Disp: , Rfl:     umeclidinium-vilanterol (ANORO ELLIPTA) 62.5-25 MCG/INH AEPB inhaler, INHALE 1 PUFF BY MOUTH EVERY DAY, Disp: , Rfl:     aspirin 81 MG chewable tablet, Take 81 mg by mouth daily, Disp: , Rfl:      Allergies   Metformin and Penicillins    Social History     Social History       Tobacco History       Smoking Status  Former      Smokeless Tobacco Use  Never              Alcohol History       Alcohol Use Status  Never              Drug Use       Drug Use Status  Never              Sexual Activity       Sexually Active  Not Asked                    Family History     Family History   Problem Relation Age of Onset    Colon Cancer Sister     Cancer Sister         Bladder    Cancer Brother         bladder       Review of Systems   Review of Systems   Constitutional: Negative. Negative for fatigue. HENT:  Positive for postnasal drip. Eyes: Negative. Respiratory: Negative. Cardiovascular: Negative. Gastrointestinal: Negative. Endocrine: Negative. Genitourinary: Negative. Musculoskeletal:  Positive for arthralgias and myalgias. Skin: Negative. Allergic/Immunologic: Negative. Neurological: Negative. Hematological: Negative. Psychiatric/Behavioral: Negative. All other systems reviewed and are negative. Physical Exam     ECOG PERFORMANCE STATUS - 1  Pain - 0 - No pain/10. Mild to moderate pain, requiring medication - see MAR     Fatigue - No flowsheet data found. Distress - No flowsheet data found. Blood pressure (!) 145/60, pulse (!) 103, temperature 98.8 °F (37.1 °C), temperature source Oral, resp. rate 16, height 5' 2.5\" (1.588 m), weight 183 lb 11.2 oz (83.3 kg), SpO2 100 %. Physical Exam  Vitals and nursing note reviewed. Exam conducted with a chaperone present. Constitutional:       Appearance: Normal appearance. HENT:      Head: Normocephalic and atraumatic. Nose: No congestion.    Cardiovascular:      Rate and Rhythm: Normal rate and regular rhythm. Pulses: Normal pulses. Heart sounds: Normal heart sounds. Pulmonary:      Effort: Pulmonary effort is normal. No respiratory distress. Breath sounds: No stridor. No wheezing or rhonchi. Abdominal:      General: Abdomen is flat. There is no distension. Palpations: Abdomen is soft. There is no mass. Musculoskeletal:      Cervical back: Normal range of motion and neck supple. Skin:     General: Skin is warm and dry. Neurological:      General: No focal deficit present. Mental Status: She is alert and oriented to person, place, and time. Psychiatric:         Mood and Affect: Mood normal.         Behavior: Behavior normal.         Thought Content:  Thought content normal.         Judgment: Judgment normal.       Labs        Recent Results (from the past 48 hour(s))   CBC with Auto Differential    Collection Time: 03/15/23 11:52 AM   Result Value Ref Range    WBC 8.6 4.3 - 11.1 K/uL    RBC 3.88 (L) 4.05 - 5.2 M/uL    Hemoglobin 12.7 11.7 - 15.4 g/dL    Hematocrit 39.5 35.8 - 46.3 %    .8 82.0 - 102.0 FL    MCH 32.7 26.1 - 32.9 PG    MCHC 32.2 31.4 - 35.0 g/dL    RDW 13.5 11.9 - 14.6 %    Platelets 269 569 - 680 K/uL    MPV 8.7 (L) 9.4 - 12.3 FL    nRBC 0.00 0.0 - 0.2 K/uL    Seg Neutrophils 65 43 - 78 %    Lymphocytes 21 13 - 44 %    Monocytes 10 4.0 - 12.0 %    Eosinophils % 2 0.5 - 7.8 %    Basophils 1 0.0 - 2.0 %    Immature Granulocytes 1 0.0 - 5.0 %    Segs Absolute 5.6 1.7 - 8.2 K/UL    Absolute Lymph # 1.8 0.5 - 4.6 K/UL    Absolute Mono # 0.8 0.1 - 1.3 K/UL    Absolute Eos # 0.2 0.0 - 0.8 K/UL    Basophils Absolute 0.1 0.0 - 0.2 K/UL    Absolute Immature Granulocyte 0.1 0.0 - 0.5 K/UL    Differential Type AUTOMATED     Comprehensive Metabolic Panel    Collection Time: 03/15/23 11:52 AM   Result Value Ref Range    Sodium 135 133 - 143 mmol/L    Potassium 4.0 3.5 - 5.1 mmol/L    Chloride 103 101 - 110 mmol/L    CO2 26 21 - 32 mmol/L    Anion Gap 6 2 - 11 mmol/L    Glucose 157 (H) 65 - 100 mg/dL    BUN 29 (H) 8 - 23 MG/DL    Creatinine 1.80 (H) 0.6 - 1.0 MG/DL    Est, Glom Filt Rate 27 (L) >60 ml/min/1.73m2    Calcium 10.3 8.3 - 10.4 MG/DL    Total Bilirubin 0.5 0.2 - 1.1 MG/DL    ALT 32 12 - 65 U/L    AST 31 15 - 37 U/L    Alk Phosphatase 125 50 - 136 U/L    Total Protein 8.2 6.3 - 8.2 g/dL    Albumin 4.0 3.2 - 4.6 g/dL    Globulin 4.2 2.8 - 4.5 g/dL    Albumin/Globulin Ratio 1.0 0.4 - 1.6     Magnesium    Collection Time: 03/15/23 11:52 AM   Result Value Ref Range    Magnesium 2.2 1.8 - 2.4 mg/dL   Cancer Antigen 125    Collection Time: 03/15/23 11:52 AM   Result Value Ref Range     26 1.5 - 35.0 U/mL            Lab Results   Component Value Date     26 03/15/2023        Component      Latest Ref Rng & Units 1/18/2023 9/21/2022 8/31/2022 8/10/2022           1:49 PM  9:43 AM  8:50 AM  9:29 AM   ,C125      1.5 - 35.0 U/mL 22 30 34 56 (H)     Component      Latest Ref Rng & Units 7/20/2022 6/29/2022 6/8/2022 5/18/2022           9:15 AM 10:09 AM 12:34 PM  1:25 PM   ,C125      1.5 - 35.0 U/mL 163 (H) 546 (H) 2479 (H) 3,987 (H)         Pathology    Name:    Francis Dailey   Accession Number:   [id number]   MR #   [id number]   Date Obtained:   4/28/2022              \"ABDOMINAL MASS\":  ADENOCARCINOMA, CONSISTENT WITH MULLERIAN ORIGIN. Sign Out Date: 5/3/2022  Jennifer Fuentes MD                           [facility]F- ER/NV/[id number] BY IHC                                                                                   Status:  Reviewed By:    Luz Maria Fuentes MD on 5/2/2022                                 Interpretation                       [employer] Flow Cytometric Analysis     Diagnosis:     No flow immunophenotypic evidence of a lymphoproliferative disorder. See full report in [facility] as errors can occur when results are   imbedded into this report.    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 Dr. Susan Feldman concchad. Antibody/Test               Marker For                              Result   Keratin AE1/AE3             Broad spectrum epithelial marker                  Positive   Cytokeratin 7               Lung, breast, upper GE, serous ovary                  Positive   Cytokeratin 20               Colon, mucinous ovary, urothelium                  Negative   CDX2                    Gastrointestinal carcinomas                       Negative   GATA3               Urothelial, breast carcinoma                       Negative   Calretinin                Mesothelioma                                 Negative   ER                    Estrogen receptor                          Positive   Napsin                     Lung adenocarcinoma                           Negative   [place]-8                    Renal cell carcinoma                           Positive   TTF-1                     Lung and thyroid adenocarcinoma                 Negative   WT-1                    Serous carcinoma, mesothelioma                 Positive     Imaging/Diagnostics Last 24 Hours   No results found. Radiology:    CT Result (most recent):  CT CHEST ABDOMEN PELVIS W CONTRAST 10/21/2022    Narrative  EXAM: CT CHEST, ABDOMEN, AND PELVIS WITH CONTRAST    INDICATION: Malignant neoplasm of unspecified ovary; Encounter for  antineoplastic chemotherapy. COMPARISON: None available    TECHNIQUE:  CT imaging was performed of the chest, abdomen, and pelvis after  intravenous injection of 100 mL Isovue 370. Intravenous contrast was used for  better evaluation of solid organs and vascular structures.  Oral contrast was  used for bowel opacification. Coronal reformatted imaging provided. Radiation  dose reduction techniques were used for this study. Our CT scanners use one or  all of the following: Automated exposure control, adjustment of the mA and/or kV  according to patient size, iterative reconstruction. FINDINGS:    CHEST:    Mediastinum and visualized thyroid: Slightly smaller right cardiophrenic nodule. Heart: Normal.    Large Vessels: Normal.    Pleura: Normal.    Lungs: No discrete lung nodule. .    Airways: Normal.    ABDOMEN AND PELVIS:    Liver: Normal in size and morphology. No focal lesions. Gallbladder/biliary: Gallstone present. Pancreas: Normal.    Spleen: Normal.    Adrenals: Normal.    Kidneys: No focal lesion or hydronephrosis. Bladder: Normal.    Pelvic organs: Normal uterus. No adnexal mass. Gastrointestinal: Normal.    Peritoneum/retroperitoneum: 1.8 x 1.6 cm mass anteriorly in the peritoneal  cavity previously measured 3.1 x 2.4 cm. Left mesenteric mass measures 0.9 x 0.7  cm, previously 1.7 x 1.3 cm. No enlarging masses. Mass adjacent to the head of  the pancreas is not well seen and not measurable on the current study. Lymph nodes: Left para-aortic lymph node is smaller, currently 1.6 x 0.9 cm,  previously 2.1 x 1.4 cm. Small perigastric node. .    Vessels: Severe aortoiliac atherosclerotic disease. Bones/Soft tissues: No aggressive bone lesions. Multilevel degenerative changes  of the spine. No chest wall abnormality discerned on current study. No  aggressive appearing bone lesions. Multilevel degenerative changes of the spine. Impression  Left periaortic lymph node is smaller compared to prior. PET Results (most recent):  PET CT SKULL BASE TO MID THIGH 03/29/2022    Narrative  PET/CT    Indication: Abdominal mass on outside CT    Radiopharmaceutical: 14.4 mCi F18-FDG, intravenously.     Technique: Imaging was performed from the skull through the proximal thighs  using routine PET/CT acquisition protocol. Imaging was performed approximately  60 minutes post injection. Oral contrast was administered. Radiation dose  reduction techniques were used for this study:  Our CT scanners use one or all  of the following: Automated exposure control, adjustment of the mA and/or kVp  according to patient's size, iterative reconstruction. Serum glucose: 93 mg/dL prior to injection. Comparison studies: None available. Findings:    Head and Neck: No enlarged or hypermetabolic lymph nodes within the neck. Chest: A lymph node in the right cardiophrenic fat is hypermetabolic, measuring  1.6 cm in maximum SUV of 10.9. Hypermetabolic nodular and slightly consolidative appearing opacities in the  posterior right upper and lower lobes and to a lesser extent the left lower  lobe. Abdomen/Pelvis: Hypermetabolic solid masses throughout the abdomen index as  follows:  Peripancreatic/periportal mass measuring 3.7 cm, maximum SUV 20.3.  3.2 cm mass medially to the gastric fundus, maximum SUV 13.2  Left para-aortic mass measuring 2.6 cm, maximum SUV 15.6. Omental mass measuring 3.5 cm, maximum SUV 13.3. Left mesenteric mass measuring 2.8 cm, maximum SUV 16.7. Right lower quadrant mass measuring 8.9 x 5.1 cm, maximum SUV 19.5. Right pelvic sidewall mass measuring 6.4 x 5.0 cm, axillary SUV 26. Hypermetabolic gas and fluid containing collection measuring 4.1 cm adjacent to  the sigmoid colon contacting the uterine fundus. Impression  1. Hypermetabolic right cardiophrenic and abdominal masses as above concerning  for malignancy such as lymphoma. 2.  Hypermetabolic 4.1 cm fluid and gas-containing collection located between  the rectosigmoid junction and uterine fundus concerning for a possible abscess  or pyomyoma. Recommend correlation for any evidence of infection.   3.  Mildly hypermetabolic nodular and slightly consolidative appearing opacities  in the right greater than left lungs appearance most consistent with a subacute  infectious/inflammatory process. The study was referred to the imaging navigator to expedite delivery of results. 12    Mammo Results (most recent):  No results found for this or any previous visit from the past 365 days. US Result (most recent):  US ABDOMINAL MASS BIOPSY PERCUTANEOUS 04/28/2022    Narrative  History: Abdominal masses    Consent: Informed written and oral consent was obtained from the patient after  explanation of benefits and risks (including, but not limited to: Infection,  Hemorrhage, and Visceral injury) of procedure. The patient's questions were  answered and requested that we proceed. Procedure:  Sterile barrier technique (including:  cap, mask, sterile gloves,  sterile sheet, hand hygiene, and chlorhexidene for cutaneous antisepsis) were  used. Following routine prep and drape of the upper abdomen, a local field  block with 1% lidocaine was achieved. Using real-time ultrasound guidance, a 17  gauge needle was advanced into the superficial aspect of the left upper quadrant  abdominal mass. Through this base needle, 4 18 gauge core biopsies were  performed. The postprocedure ultrasound demonstrates no evidence of hematoma. Complications: None. Medications:  Under physician supervision, intraservice time of 17 minutes of  moderate intravenous conscious sedation was performed by administering Versed,  Fentanyl, intravenously. Continuous pulse oximetry, heart rate, and blood  pressure monitoring was performed with an independent, trained observer present. Impression  Technically successful ultrasound guided left upper quadrant  abdominal mass biopsy. The gas and fluid filled focus in the pelvis is not  amenable to percutaneous drainage or biopsy. Specimen: Sent to cytopathology. Plan: The patient will recover for approximately 1 hour         Assessment       Diagnosis Orders   1.  [facility]  Miscellaneous Sendout      2.  Encounter for antineoplastic chemotherapy                Specialty Problems          Oncology Problems    Malignant neoplasm of ovary ([facility] [address]                     Plan   Cont current med/current dose   No issues with labs or side effects  Indefinite duration reviewed with pt and family    Cont q8week visit and labs, or prn    Precautions reviewed    Cont with pcp            Herberth Martinez MD, APRN  [facility]  [address] [place] [address]  Office : [phone]  Fax : [phone]

## 2023-03-15 ENCOUNTER — OFFICE VISIT (OUTPATIENT)
Dept: ONCOLOGY | Age: 88
End: 2023-03-15
Payer: MEDICARE

## 2023-03-15 ENCOUNTER — HOSPITAL ENCOUNTER (OUTPATIENT)
Dept: LAB | Age: 88
Discharge: HOME | End: 2023-03-18
Payer: MEDICARE

## 2023-03-15 VITALS
DIASTOLIC BLOOD PRESSURE: 60 MMHG | OXYGEN SATURATION: 100 % | HEIGHT: 63 IN | SYSTOLIC BLOOD PRESSURE: 145 MMHG | BODY MASS INDEX: 32.55 KG/M2 | TEMPERATURE: 98.8 F | HEART RATE: 103 BPM | RESPIRATION RATE: 16 BRPM | WEIGHT: 183.7 LBS

## 2023-03-15 DIAGNOSIS — C56.9 OVARY CANCER, UNSPECIFIED LATERALITY (HCC): Primary | ICD-10-CM

## 2023-03-15 DIAGNOSIS — R79.9 ABNORMAL FINDING OF BLOOD CHEMISTRY, UNSPECIFIED: ICD-10-CM

## 2023-03-15 DIAGNOSIS — C56.9 OVARY CANCER, UNSPECIFIED LATERALITY (HCC): ICD-10-CM

## 2023-03-15 DIAGNOSIS — Z51.11 ENCOUNTER FOR ANTINEOPLASTIC CHEMOTHERAPY: ICD-10-CM

## 2023-03-15 LAB
ALBUMIN SERPL-MCNC: 4 G/DL (ref 3.2–4.6)
ALBUMIN/GLOB SERPL: 1 (ref 0.4–1.6)
ALP SERPL-CCNC: 125 U/L (ref 50–136)
ALT SERPL-CCNC: 32 U/L (ref 12–65)
ANION GAP SERPL CALC-SCNC: 6 MMOL/L (ref 2–11)
AST SERPL-CCNC: 31 U/L (ref 15–37)
BASOPHILS # BLD: 0.1 K/UL (ref 0–0.2)
BASOPHILS NFR BLD: 1 % (ref 0–2)
BILIRUB SERPL-MCNC: 0.5 MG/DL (ref 0.2–1.1)
BUN SERPL-MCNC: 29 MG/DL (ref 8–23)
CALCIUM SERPL-MCNC: 10.3 MG/DL (ref 8.3–10.4)
CANCER AG125 SERPL-ACNC: 26 U/ML (ref 1.5–35)
CHLORIDE SERPL-SCNC: 103 MMOL/L (ref 101–110)
CO2 SERPL-SCNC: 26 MMOL/L (ref 21–32)
CREAT SERPL-MCNC: 1.8 MG/DL (ref 0.6–1)
DIFFERENTIAL METHOD BLD: ABNORMAL
EOSINOPHIL # BLD: 0.2 K/UL (ref 0–0.8)
EOSINOPHIL NFR BLD: 2 % (ref 0.5–7.8)
ERYTHROCYTE [DISTWIDTH] IN BLOOD BY AUTOMATED COUNT: 13.5 % (ref 11.9–14.6)
GLOBULIN SER CALC-MCNC: 4.2 G/DL (ref 2.8–4.5)
GLUCOSE SERPL-MCNC: 157 MG/DL (ref 65–100)
HCT VFR BLD AUTO: 39.5 % (ref 35.8–46.3)
HGB BLD-MCNC: 12.7 G/DL (ref 11.7–15.4)
IMM GRANULOCYTES # BLD AUTO: 0.1 K/UL (ref 0–0.5)
IMM GRANULOCYTES NFR BLD AUTO: 1 % (ref 0–5)
LYMPHOCYTES # BLD: 1.8 K/UL (ref 0.5–4.6)
LYMPHOCYTES NFR BLD: 21 % (ref 13–44)
MAGNESIUM SERPL-MCNC: 2.2 MG/DL (ref 1.8–2.4)
MCH RBC QN AUTO: 32.7 PG (ref 26.1–32.9)
MCHC RBC AUTO-ENTMCNC: 32.2 G/DL (ref 31.4–35)
MCV RBC AUTO: 101.8 FL (ref 82–102)
MONOCYTES # BLD: 0.8 K/UL (ref 0.1–1.3)
MONOCYTES NFR BLD: 10 % (ref 4–12)
NEUTS SEG # BLD: 5.6 K/UL (ref 1.7–8.2)
NEUTS SEG NFR BLD: 65 % (ref 43–78)
NRBC # BLD: 0 K/UL (ref 0–0.2)
PLATELET # BLD AUTO: 230 K/UL (ref 150–450)
PMV BLD AUTO: 8.7 FL (ref 9.4–12.3)
POTASSIUM SERPL-SCNC: 4 MMOL/L (ref 3.5–5.1)
PROT SERPL-MCNC: 8.2 G/DL (ref 6.3–8.2)
RBC # BLD AUTO: 3.88 M/UL (ref 4.05–5.2)
SODIUM SERPL-SCNC: 135 MMOL/L (ref 133–143)
WBC # BLD AUTO: 8.6 K/UL (ref 4.3–11.1)

## 2023-03-15 PROCEDURE — G8400 PT W/DXA NO RESULTS DOC: HCPCS | Performed by: OBSTETRICS & GYNECOLOGY

## 2023-03-15 PROCEDURE — G8417 CALC BMI ABV UP PARAM F/U: HCPCS | Performed by: OBSTETRICS & GYNECOLOGY

## 2023-03-15 PROCEDURE — 36415 COLL VENOUS BLD VENIPUNCTURE: CPT

## 2023-03-15 PROCEDURE — G8484 FLU IMMUNIZE NO ADMIN: HCPCS | Performed by: OBSTETRICS & GYNECOLOGY

## 2023-03-15 PROCEDURE — 1090F PRES/ABSN URINE INCON ASSESS: CPT | Performed by: OBSTETRICS & GYNECOLOGY

## 2023-03-15 PROCEDURE — 83735 ASSAY OF MAGNESIUM: CPT

## 2023-03-15 PROCEDURE — 3077F SYST BP >= 140 MM HG: CPT | Performed by: OBSTETRICS & GYNECOLOGY

## 2023-03-15 PROCEDURE — 86304 IMMUNOASSAY TUMOR CA 125: CPT

## 2023-03-15 PROCEDURE — 1123F ACP DISCUSS/DSCN MKR DOCD: CPT | Performed by: OBSTETRICS & GYNECOLOGY

## 2023-03-15 PROCEDURE — 85025 COMPLETE CBC W/AUTO DIFF WBC: CPT

## 2023-03-15 PROCEDURE — 99214 OFFICE O/P EST MOD 30 MIN: CPT | Performed by: OBSTETRICS & GYNECOLOGY

## 2023-03-15 PROCEDURE — 1036F TOBACCO NON-USER: CPT | Performed by: OBSTETRICS & GYNECOLOGY

## 2023-03-15 PROCEDURE — 80053 COMPREHEN METABOLIC PANEL: CPT

## 2023-03-15 PROCEDURE — 3078F DIAST BP <80 MM HG: CPT | Performed by: OBSTETRICS & GYNECOLOGY

## 2023-03-15 PROCEDURE — G8427 DOCREV CUR MEDS BY ELIG CLIN: HCPCS | Performed by: OBSTETRICS & GYNECOLOGY

## 2023-03-15 ASSESSMENT — PATIENT HEALTH QUESTIONNAIRE - PHQ9
SUM OF ALL RESPONSES TO PHQ QUESTIONS 1-9: 0
2. FEELING DOWN, DEPRESSED OR HOPELESS: 0
SUM OF ALL RESPONSES TO PHQ QUESTIONS 1-9: 0
1. LITTLE INTEREST OR PLEASURE IN DOING THINGS: 0
SUM OF ALL RESPONSES TO PHQ9 QUESTIONS 1 & 2: 0
SUM OF ALL RESPONSES TO PHQ QUESTIONS 1-9: 0
SUM OF ALL RESPONSES TO PHQ QUESTIONS 1-9: 0

## 2023-03-20 RX ORDER — GLIMEPIRIDE 1 MG/1
1 TABLET ORAL DAILY
Qty: 90 TABLET | Refills: 2 | Status: SHIPPED | OUTPATIENT
Start: 2023-03-20 | End: 2024-07-16

## 2023-05-04 DIAGNOSIS — R79.9 ABNORMAL FINDING OF BLOOD CHEMISTRY, UNSPECIFIED: ICD-10-CM

## 2023-05-04 DIAGNOSIS — C56.9 OVARY CANCER, UNSPECIFIED LATERALITY (HCC): Primary | ICD-10-CM

## 2023-05-12 ENCOUNTER — HOSPITAL ENCOUNTER (OUTPATIENT)
Dept: LAB | Age: 88
End: 2023-05-12
Payer: MEDICARE

## 2023-05-12 ENCOUNTER — OFFICE VISIT (OUTPATIENT)
Dept: ONCOLOGY | Age: 88
End: 2023-05-12

## 2023-05-12 VITALS
DIASTOLIC BLOOD PRESSURE: 76 MMHG | TEMPERATURE: 98.2 F | HEART RATE: 91 BPM | HEIGHT: 63 IN | RESPIRATION RATE: 16 BRPM | OXYGEN SATURATION: 93 % | SYSTOLIC BLOOD PRESSURE: 135 MMHG | BODY MASS INDEX: 32.87 KG/M2 | WEIGHT: 185.5 LBS

## 2023-05-12 DIAGNOSIS — C56.9 OVARY CANCER, UNSPECIFIED LATERALITY (HCC): ICD-10-CM

## 2023-05-12 DIAGNOSIS — C56.9 OVARY CANCER, UNSPECIFIED LATERALITY (HCC): Primary | ICD-10-CM

## 2023-05-12 DIAGNOSIS — R79.9 ABNORMAL FINDING OF BLOOD CHEMISTRY, UNSPECIFIED: ICD-10-CM

## 2023-05-12 DIAGNOSIS — Z51.11 ENCOUNTER FOR ANTINEOPLASTIC CHEMOTHERAPY: ICD-10-CM

## 2023-05-12 LAB
ALBUMIN SERPL-MCNC: 3.8 G/DL (ref 3.2–4.6)
ALBUMIN/GLOB SERPL: 0.9 (ref 0.4–1.6)
ALP SERPL-CCNC: 151 U/L (ref 50–136)
ALT SERPL-CCNC: 24 U/L (ref 12–65)
ANION GAP SERPL CALC-SCNC: 6 MMOL/L (ref 2–11)
AST SERPL-CCNC: 20 U/L (ref 15–37)
BASOPHILS # BLD: 0.1 K/UL (ref 0–0.2)
BASOPHILS NFR BLD: 1 % (ref 0–2)
BILIRUB SERPL-MCNC: 0.5 MG/DL (ref 0.2–1.1)
BUN SERPL-MCNC: 25 MG/DL (ref 8–23)
CALCIUM SERPL-MCNC: 9.5 MG/DL (ref 8.3–10.4)
CANCER AG125 SERPL-ACNC: 21 U/ML (ref 1.5–35)
CHLORIDE SERPL-SCNC: 106 MMOL/L (ref 101–110)
CO2 SERPL-SCNC: 26 MMOL/L (ref 21–32)
CREAT SERPL-MCNC: 1.7 MG/DL (ref 0.6–1)
DIFFERENTIAL METHOD BLD: ABNORMAL
EOSINOPHIL # BLD: 0.2 K/UL (ref 0–0.8)
EOSINOPHIL NFR BLD: 3 % (ref 0.5–7.8)
ERYTHROCYTE [DISTWIDTH] IN BLOOD BY AUTOMATED COUNT: 13.5 % (ref 11.9–14.6)
GLOBULIN SER CALC-MCNC: 4.2 G/DL (ref 2.8–4.5)
GLUCOSE SERPL-MCNC: 166 MG/DL (ref 65–100)
HCT VFR BLD AUTO: 38.5 % (ref 35.8–46.3)
HGB BLD-MCNC: 12.4 G/DL (ref 11.7–15.4)
IMM GRANULOCYTES # BLD AUTO: 0.1 K/UL (ref 0–0.5)
IMM GRANULOCYTES NFR BLD AUTO: 1 % (ref 0–5)
LYMPHOCYTES # BLD: 1.8 K/UL (ref 0.5–4.6)
LYMPHOCYTES NFR BLD: 26 % (ref 13–44)
MAGNESIUM SERPL-MCNC: 2.2 MG/DL (ref 1.8–2.4)
MCH RBC QN AUTO: 33.1 PG (ref 26.1–32.9)
MCHC RBC AUTO-ENTMCNC: 32.2 G/DL (ref 31.4–35)
MCV RBC AUTO: 102.7 FL (ref 82–102)
MONOCYTES # BLD: 0.6 K/UL (ref 0.1–1.3)
MONOCYTES NFR BLD: 9 % (ref 4–12)
NEUTS SEG # BLD: 4.2 K/UL (ref 1.7–8.2)
NEUTS SEG NFR BLD: 61 % (ref 43–78)
NRBC # BLD: 0 K/UL (ref 0–0.2)
PLATELET # BLD AUTO: 218 K/UL (ref 150–450)
PMV BLD AUTO: 9 FL (ref 9.4–12.3)
POTASSIUM SERPL-SCNC: 4.4 MMOL/L (ref 3.5–5.1)
PROT SERPL-MCNC: 8 G/DL (ref 6.3–8.2)
RBC # BLD AUTO: 3.75 M/UL (ref 4.05–5.2)
SODIUM SERPL-SCNC: 138 MMOL/L (ref 133–143)
WBC # BLD AUTO: 6.8 K/UL (ref 4.3–11.1)

## 2023-05-12 PROCEDURE — 83735 ASSAY OF MAGNESIUM: CPT

## 2023-05-12 PROCEDURE — 85025 COMPLETE CBC W/AUTO DIFF WBC: CPT

## 2023-05-12 PROCEDURE — 86304 IMMUNOASSAY TUMOR CA 125: CPT

## 2023-05-12 PROCEDURE — 36415 COLL VENOUS BLD VENIPUNCTURE: CPT

## 2023-05-12 PROCEDURE — 80053 COMPREHEN METABOLIC PANEL: CPT

## 2023-05-12 ASSESSMENT — PATIENT HEALTH QUESTIONNAIRE - PHQ9
2. FEELING DOWN, DEPRESSED OR HOPELESS: 0
SUM OF ALL RESPONSES TO PHQ QUESTIONS 1-9: 0

## 2023-05-12 NOTE — PROGRESS NOTES
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 who presents today for scheduled visit. Elevated CA-125 at dx. Possible recent hx GI perf/diverticulitis?, imaging 3/2022. Only sx was rectal bleeding, resolved. 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 Alicia Camejo, on nocturnal home  O2), NIDDM, htn, OA, right ankle surgery after fracture, hospitalization 2/8/22 for Covid-19 infection, UTI, dehydration. More recently seen by them and underwent PET scan after a more recent CT abd (reportedly done for abdominal discomfort and blood per rectum) reportedly had shown abdominal mass. PET scan revealed hypermetabolic multiple abdominal masses as well as right  cardiophrenic mass concerning for malignancy including lymphoma. A hypermetabolic 4.1 cm fluid and gas containing collection located at rectosigmoid junction and uterine fundus concerning for possible abscess also noted. A mildly hypermetabolic consolidative  appearing opacities in the right greater than left lung most consistent with subacute infection/inflammatory process also noted. Denies any recent fevers or drenching night sweats, appears non-toxic. 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 She has no GI//PULM/CV/Neuro complaints today. She sates she is tolerating chemotherapy well.  Pain is

## 2023-07-11 DIAGNOSIS — Z51.11 ENCOUNTER FOR ANTINEOPLASTIC CHEMOTHERAPY: ICD-10-CM

## 2023-07-11 DIAGNOSIS — C56.9 OVARY CANCER, UNSPECIFIED LATERALITY (HCC): Primary | ICD-10-CM

## 2023-07-11 NOTE — PROGRESS NOTES
guided left upper quadrant  abdominal mass biopsy. The gas and fluid filled focus in the pelvis is not  amenable to percutaneous drainage or biopsy. Specimen: Sent to cytopathology. Plan: The patient will recover for approximately 1 hour         Assessment       Diagnosis Orders   1.  Ovary cancer, unspecified laterality [facility])                    Specialty Problems          Oncology Problems    Malignant neoplasm of ovary ([address])                     Plan   Cont current med/current dose   No issues with labs or side effects  Indefinite duration reviewed with pt and family    Cont q8week visit and labs, or prn    Precautions reviewed    Cont with pcp-reviewed with pt and family need for close, regular fu for htn/chronic renal isuff   Will refill lisonopril today            Rajinder Brizuela MD, [facility]  [address] [place] [address]  Office : [phone]  Fax : [phone]

## 2023-07-12 ENCOUNTER — OFFICE VISIT (OUTPATIENT)
Dept: ONCOLOGY | Age: 88
End: 2023-07-12
Payer: MEDICARE

## 2023-07-12 ENCOUNTER — HOSPITAL ENCOUNTER (OUTPATIENT)
Dept: LAB | Age: 88
Discharge: HOME | End: 2023-07-15
Payer: MEDICARE

## 2023-07-12 VITALS
TEMPERATURE: 97.8 F | HEART RATE: 96 BPM | SYSTOLIC BLOOD PRESSURE: 162 MMHG | OXYGEN SATURATION: 91 % | DIASTOLIC BLOOD PRESSURE: 71 MMHG | RESPIRATION RATE: 18 BRPM | BODY MASS INDEX: 32.28 KG/M2 | HEIGHT: 63 IN | WEIGHT: 182.2 LBS

## 2023-07-12 DIAGNOSIS — C56.9 OVARY CANCER, UNSPECIFIED LATERALITY (HCC): ICD-10-CM

## 2023-07-12 DIAGNOSIS — Z51.11 ENCOUNTER FOR ANTINEOPLASTIC CHEMOTHERAPY: ICD-10-CM

## 2023-07-12 LAB
ALBUMIN SERPL-MCNC: 3.7 G/DL (ref 3.2–4.6)
ALBUMIN/GLOB SERPL: 1 (ref 0.4–1.6)
ALP SERPL-CCNC: 139 U/L (ref 50–136)
ALT SERPL-CCNC: 29 U/L (ref 12–65)
ANION GAP SERPL CALC-SCNC: 4 MMOL/L (ref 2–11)
AST SERPL-CCNC: 26 U/L (ref 15–37)
BASOPHILS # BLD: 0 K/UL (ref 0–0.2)
BASOPHILS NFR BLD: 1 % (ref 0–2)
BILIRUB SERPL-MCNC: 0.5 MG/DL (ref 0.2–1.1)
BUN SERPL-MCNC: 34 MG/DL (ref 8–23)
CALCIUM SERPL-MCNC: 9.6 MG/DL (ref 8.3–10.4)
CANCER AG125 SERPL-ACNC: 15 U/ML (ref 1.5–35)
CHLORIDE SERPL-SCNC: 105 MMOL/L (ref 101–110)
CO2 SERPL-SCNC: 25 MMOL/L (ref 21–32)
CREAT SERPL-MCNC: 1.9 MG/DL (ref 0.6–1)
DIFFERENTIAL METHOD BLD: ABNORMAL
EOSINOPHIL # BLD: 0.1 K/UL (ref 0–0.8)
EOSINOPHIL NFR BLD: 2 % (ref 0.5–7.8)
ERYTHROCYTE [DISTWIDTH] IN BLOOD BY AUTOMATED COUNT: 13.2 % (ref 11.9–14.6)
GLOBULIN SER CALC-MCNC: 3.8 G/DL (ref 2.8–4.5)
GLUCOSE SERPL-MCNC: 113 MG/DL (ref 65–100)
HCT VFR BLD AUTO: 35 % (ref 35.8–46.3)
HGB BLD-MCNC: 11.6 G/DL (ref 11.7–15.4)
IMM GRANULOCYTES # BLD AUTO: 0 K/UL (ref 0–0.5)
IMM GRANULOCYTES NFR BLD AUTO: 1 % (ref 0–5)
LYMPHOCYTES # BLD: 1.7 K/UL (ref 0.5–4.6)
LYMPHOCYTES NFR BLD: 27 % (ref 13–44)
MAGNESIUM SERPL-MCNC: 2.3 MG/DL (ref 1.8–2.4)
MCH RBC QN AUTO: 33.6 PG (ref 26.1–32.9)
MCHC RBC AUTO-ENTMCNC: 33.1 G/DL (ref 31.4–35)
MCV RBC AUTO: 101.4 FL (ref 82–102)
MONOCYTES # BLD: 0.7 K/UL (ref 0.1–1.3)
MONOCYTES NFR BLD: 11 % (ref 4–12)
NEUTS SEG # BLD: 3.8 K/UL (ref 1.7–8.2)
NEUTS SEG NFR BLD: 58 % (ref 43–78)
NRBC # BLD: 0 K/UL (ref 0–0.2)
PLATELET # BLD AUTO: 187 K/UL (ref 150–450)
PMV BLD AUTO: 8.7 FL (ref 9.4–12.3)
POTASSIUM SERPL-SCNC: 4.4 MMOL/L (ref 3.5–5.1)
PROT SERPL-MCNC: 7.5 G/DL (ref 6.3–8.2)
RBC # BLD AUTO: 3.45 M/UL (ref 4.05–5.2)
SODIUM SERPL-SCNC: 134 MMOL/L (ref 133–143)
WBC # BLD AUTO: 6.4 K/UL (ref 4.3–11.1)

## 2023-07-12 PROCEDURE — 99214 OFFICE O/P EST MOD 30 MIN: CPT | Performed by: OBSTETRICS & GYNECOLOGY

## 2023-07-12 PROCEDURE — 3078F DIAST BP <80 MM HG: CPT | Performed by: OBSTETRICS & GYNECOLOGY

## 2023-07-12 PROCEDURE — 1123F ACP DISCUSS/DSCN MKR DOCD: CPT | Performed by: OBSTETRICS & GYNECOLOGY

## 2023-07-12 PROCEDURE — G8400 PT W/DXA NO RESULTS DOC: HCPCS | Performed by: OBSTETRICS & GYNECOLOGY

## 2023-07-12 PROCEDURE — 1090F PRES/ABSN URINE INCON ASSESS: CPT | Performed by: OBSTETRICS & GYNECOLOGY

## 2023-07-12 PROCEDURE — 3077F SYST BP >= 140 MM HG: CPT | Performed by: OBSTETRICS & GYNECOLOGY

## 2023-07-12 PROCEDURE — 80053 COMPREHEN METABOLIC PANEL: CPT

## 2023-07-12 PROCEDURE — 85025 COMPLETE CBC W/AUTO DIFF WBC: CPT

## 2023-07-12 PROCEDURE — G8428 CUR MEDS NOT DOCUMENT: HCPCS | Performed by: OBSTETRICS & GYNECOLOGY

## 2023-07-12 PROCEDURE — 36415 COLL VENOUS BLD VENIPUNCTURE: CPT

## 2023-07-12 PROCEDURE — 86304 IMMUNOASSAY TUMOR CA 125: CPT

## 2023-07-12 PROCEDURE — 1036F TOBACCO NON-USER: CPT | Performed by: OBSTETRICS & GYNECOLOGY

## 2023-07-12 PROCEDURE — 83735 ASSAY OF MAGNESIUM: CPT

## 2023-07-12 PROCEDURE — G8417 CALC BMI ABV UP PARAM F/U: HCPCS | Performed by: OBSTETRICS & GYNECOLOGY

## 2023-07-12 RX ORDER — LISINOPRIL 10 MG/1
10 TABLET ORAL DAILY
Qty: 90 TABLET | Refills: 1 | Status: SHIPPED | OUTPATIENT
Start: 2023-07-12 | End: 2023-07-12

## 2023-07-12 RX ORDER — LISINOPRIL 10 MG/1
10 TABLET ORAL DAILY
Qty: 90 TABLET | Refills: 0 | Status: SHIPPED | OUTPATIENT
Start: 2023-07-12

## 2023-07-12 ASSESSMENT — PATIENT HEALTH QUESTIONNAIRE - PHQ9
SUM OF ALL RESPONSES TO PHQ QUESTIONS 1-9: 0
2. FEELING DOWN, DEPRESSED OR HOPELESS: 0
1. LITTLE INTEREST OR PLEASURE IN DOING THINGS: 0
SUM OF ALL RESPONSES TO PHQ QUESTIONS 1-9: 0
SUM OF ALL RESPONSES TO PHQ9 QUESTIONS 1 & 2: 0

## 2023-07-18 ENCOUNTER — TELEPHONE (OUTPATIENT)
Dept: ONCOLOGY | Age: 88
End: 2023-07-18

## 2023-07-18 NOTE — TELEPHONE ENCOUNTER
Physician provider:vicki  Reason for today's call: medication problem   Last office visit:7/12/2023    Patient notified that their information will be routed to the [facility] clinical triage team for review. Patient is advised that they will receive a phone call from the triage department. If symptoms worsen before receiving a call back, the patient has been advised to proceed to the nearest ED. Arabella  calling from [facility] regarding the PT medication zejula 100 Mg. Arabella would like to report that they are currently  out of the capsules for zejula until next month. Arabella would like to know if Dr. Joel Martin and his team would like to put the prescription on hold until the receive the medication? Or how  would they  like to go about the prescription please advise.     Pharmacy: [facility]   [phone]  Option 3 for pharmacy

## 2023-07-18 NOTE — TELEPHONE ENCOUNTER
Returned call to pharmacy - the were calling to inform that they will eventually be transitioning to a tablet form of this medication. There will be a 200mg tablet option in the future - but it is currently unavailable at this time. However, they do have the capsules in stock and can fill rx w/ capsules. Chart reviewed - rx was written for 100mg capsules. Advised to fill as prescribed. Can consider transitioning to tablet form when it is actually in stock.

## 2023-07-28 ENCOUNTER — TELEPHONE (OUTPATIENT)
Dept: INTERNAL MEDICINE CLINIC | Facility: CLINIC | Age: 88
End: 2023-07-28

## 2023-07-28 NOTE — TELEPHONE ENCOUNTER
----- Message from Jose Francisco Stinson sent at 7/28/2023  3:12 PM EDT -----  Subject: Referral Request    Reason for referral request? Pt states she needs labs [place] for her kidney   Dr. Tr Wilkins believe she is going into kidney failure and wants to be sure. Provider patient wants to be referred to(if known):     Provider Phone Number(if known): Additional Information for Provider?  Would like a lab visit around 315 or   330 any day of the week.   ---------------------------------------------------------------------------  --------------  [address]    [phone]; OK to leave message on voicemail  ---------------------------------------------------------------------------  --------------

## 2023-07-28 NOTE — TELEPHONE ENCOUNTER
States her oncologist wants us to check her kidney function. They cannot check this according to the pt. Pt has appointment with [facility] in October. Former Dr. Mariposa Jacobsen pt.

## 2023-07-28 NOTE — TELEPHONE ENCOUNTER
I have talked with Ms. Bryant Paz and have given her this message. I have also scheduled her with Sandra on Monday.

## 2023-07-31 ENCOUNTER — OFFICE VISIT (OUTPATIENT)
Dept: INTERNAL MEDICINE CLINIC | Facility: CLINIC | Age: 88
End: 2023-07-31
Payer: MEDICARE

## 2023-07-31 VITALS
OXYGEN SATURATION: 96 % | SYSTOLIC BLOOD PRESSURE: 138 MMHG | DIASTOLIC BLOOD PRESSURE: 60 MMHG | HEIGHT: 63 IN | HEART RATE: 86 BPM | BODY MASS INDEX: 32.25 KG/M2 | WEIGHT: 182 LBS | TEMPERATURE: 97.9 F

## 2023-07-31 DIAGNOSIS — N18.4 CKD (CHRONIC KIDNEY DISEASE) STAGE 4, GFR 15-29 ML/MIN (HCC): Primary | ICD-10-CM

## 2023-07-31 LAB
ANION GAP SERPL CALC-SCNC: 9 MMOL/L (ref 2–11)
BUN SERPL-MCNC: 27 MG/DL (ref 8–23)
CALCIUM SERPL-MCNC: 10.5 MG/DL (ref 8.3–10.4)
CHLORIDE SERPL-SCNC: 104 MMOL/L (ref 101–110)
CO2 SERPL-SCNC: 25 MMOL/L (ref 21–32)
CREAT SERPL-MCNC: 2 MG/DL (ref 0.6–1)
GLUCOSE SERPL-MCNC: 113 MG/DL (ref 65–100)
POTASSIUM SERPL-SCNC: 4.8 MMOL/L (ref 3.5–5.1)
SODIUM SERPL-SCNC: 138 MMOL/L (ref 133–143)

## 2023-07-31 PROCEDURE — 1090F PRES/ABSN URINE INCON ASSESS: CPT | Performed by: NURSE PRACTITIONER

## 2023-07-31 PROCEDURE — 1036F TOBACCO NON-USER: CPT | Performed by: NURSE PRACTITIONER

## 2023-07-31 PROCEDURE — 99213 OFFICE O/P EST LOW 20 MIN: CPT | Performed by: NURSE PRACTITIONER

## 2023-07-31 PROCEDURE — G8417 CALC BMI ABV UP PARAM F/U: HCPCS | Performed by: NURSE PRACTITIONER

## 2023-07-31 PROCEDURE — 3075F SYST BP GE 130 - 139MM HG: CPT | Performed by: NURSE PRACTITIONER

## 2023-07-31 PROCEDURE — G8427 DOCREV CUR MEDS BY ELIG CLIN: HCPCS | Performed by: NURSE PRACTITIONER

## 2023-07-31 PROCEDURE — 3078F DIAST BP <80 MM HG: CPT | Performed by: NURSE PRACTITIONER

## 2023-07-31 PROCEDURE — 1123F ACP DISCUSS/DSCN MKR DOCD: CPT | Performed by: NURSE PRACTITIONER

## 2023-07-31 PROCEDURE — G8400 PT W/DXA NO RESULTS DOC: HCPCS | Performed by: NURSE PRACTITIONER

## 2023-07-31 ASSESSMENT — ENCOUNTER SYMPTOMS
VOMITING: 0
CONSTIPATION: 0
DIARRHEA: 0
NAUSEA: 0
SHORTNESS OF BREATH: 0

## 2023-08-01 ENCOUNTER — TELEPHONE (OUTPATIENT)
Dept: ONCOLOGY | Age: 88
End: 2023-08-01

## 2023-08-01 ENCOUNTER — TELEPHONE (OUTPATIENT)
Dept: INTERNAL MEDICINE CLINIC | Facility: CLINIC | Age: 88
End: 2023-08-01

## 2023-08-01 DIAGNOSIS — N18.4 CKD (CHRONIC KIDNEY DISEASE) STAGE 4, GFR 15-29 ML/MIN (HCC): Primary | ICD-10-CM

## 2023-08-01 LAB
CREAT UR-MCNC: 79 MG/DL
MICROALBUMIN UR-MCNC: 4.4 MG/DL
MICROALBUMIN/CREAT UR-RTO: 56 MG/G (ref 0–30)

## 2023-08-01 NOTE — TELEPHONE ENCOUNTER
Physician provider: preeti  Reason for today's call: Medication Rx need by pharm  Last office visit: n/a    Patient notified that their information will be routed to the [facility] clinical triage team for review. Patient is advised that they will receive a phone call from the triage department. If symptoms worsen before receiving a call back, the patient has been advised to proceed to the nearest ED. Pt called stating they need a new Rx for: Zejula 100 MG to be switched from caps to tabs. Pharm Rx [facility] by Janet Figueroa in [place], [place]. Pt asks for call back to update on status due to being down to 4 at this time.

## 2023-08-01 NOTE — TELEPHONE ENCOUNTER
Ms. Bahman Galaviz-  The labs show that the kidney function continues to decline. I would like for you to see a kidney specialist. I have referred you to [facility] and they should be calling you for an appointment. I also saw that you take Indocin, which is an NSAID. Please stop this and any other NSAID as we discussed yesterday. Use Tylenol only for arthritis pain. Please let me know if you don't hear from the kidney specialists in a timely manner, or if you have any new or concerning symptoms. Here if you need us! Sandra    Please give them the contact info for nephrology. Thanks!   [facility]  [facility],[address]  [address] [place] [address]  [phone]

## 2023-08-02 RX ORDER — NIRAPARIB 100 MG/1
200 TABLET, FILM COATED ORAL DAILY
Qty: 60 TABLET | Refills: 5 | Status: SHIPPED | OUTPATIENT
Start: 2023-08-02

## 2023-08-02 NOTE — TELEPHONE ENCOUNTER
Spoke with pt's son Robinson Naqvi. I gave him the results and he relayed understanding. He was also given the number to [facility]. Attending Attestation (For Attendings USE Only)...

## 2023-09-19 DIAGNOSIS — C56.9 MALIGNANT NEOPLASM OF OVARY, UNSPECIFIED LATERALITY (HCC): Primary | ICD-10-CM

## 2023-09-19 DIAGNOSIS — R97.1 ELEVATED CANCER ANTIGEN 125 (CA 125): ICD-10-CM

## 2023-09-19 NOTE — PROGRESS NOTES
focus in the pelvis is not  amenable to percutaneous drainage or biopsy. Specimen: Sent to cytopathology. Plan: The patient will recover for approximately 1 hour         Assessment       Diagnosis Orders   1. Ovary cancer, unspecified laterality ([address])      CBC with Auto Differential    Comprehensive Metabolic Panel    Magnesium      2. Elevated cancer antigen 125 ()        3.  Encounter for antineoplastic chemotherapy                      Specialty Problems          Oncology Problems    Malignant neoplasm of ovary ([address])                     Plan   Cont current med/current dose   No issues with labs or side effects  Indefinite duration reviewed with pt and family    Cont q8week visit and labs, or prn    Precautions reviewed    Cont with pcp-reviewed with pt and family need for close, regular fu for htn/chronic renal isuff   Will refill lisonopril today            Casandra George MD, [facility]  [address] [place] [address]  Office : [phone]  Fax : [phone]

## 2023-09-20 ENCOUNTER — HOSPITAL ENCOUNTER (OUTPATIENT)
Dept: LAB | Age: 88
Discharge: HOME | End: 2023-09-23
Payer: MEDICARE

## 2023-09-20 ENCOUNTER — OFFICE VISIT (OUTPATIENT)
Dept: ONCOLOGY | Age: 88
End: 2023-09-20
Payer: MEDICARE

## 2023-09-20 VITALS
WEIGHT: 181 LBS | SYSTOLIC BLOOD PRESSURE: 153 MMHG | HEART RATE: 78 BPM | TEMPERATURE: 98 F | BODY MASS INDEX: 32.58 KG/M2 | OXYGEN SATURATION: 98 % | RESPIRATION RATE: 16 BRPM | DIASTOLIC BLOOD PRESSURE: 68 MMHG

## 2023-09-20 DIAGNOSIS — R97.1 ELEVATED CANCER ANTIGEN 125 (CA 125): ICD-10-CM

## 2023-09-20 DIAGNOSIS — C56.9 MALIGNANT NEOPLASM OF OVARY, UNSPECIFIED LATERALITY (HCC): ICD-10-CM

## 2023-09-20 DIAGNOSIS — Z51.11 ENCOUNTER FOR ANTINEOPLASTIC CHEMOTHERAPY: ICD-10-CM

## 2023-09-20 DIAGNOSIS — C56.9 OVARY CANCER, UNSPECIFIED LATERALITY (HCC): Primary | ICD-10-CM

## 2023-09-20 LAB
ALBUMIN SERPL-MCNC: 3.6 G/DL (ref 3.2–4.6)
ALBUMIN/GLOB SERPL: 0.9 (ref 0.4–1.6)
ALP SERPL-CCNC: 130 U/L (ref 50–136)
ALT SERPL-CCNC: 24 U/L (ref 12–65)
ANION GAP SERPL CALC-SCNC: 4 MMOL/L (ref 2–11)
AST SERPL-CCNC: 17 U/L (ref 15–37)
BASOPHILS # BLD: 0 K/UL (ref 0–0.2)
BASOPHILS NFR BLD: 1 % (ref 0–2)
BILIRUB SERPL-MCNC: 0.4 MG/DL (ref 0.2–1.1)
BUN SERPL-MCNC: 21 MG/DL (ref 8–23)
CALCIUM SERPL-MCNC: 9.3 MG/DL (ref 8.3–10.4)
CANCER AG125 SERPL-ACNC: 19 U/ML (ref 1.5–35)
CHLORIDE SERPL-SCNC: 103 MMOL/L (ref 101–110)
CO2 SERPL-SCNC: 31 MMOL/L (ref 21–32)
CREAT SERPL-MCNC: 1.7 MG/DL (ref 0.6–1)
DIFFERENTIAL METHOD BLD: ABNORMAL
EOSINOPHIL # BLD: 0.2 K/UL (ref 0–0.8)
EOSINOPHIL NFR BLD: 2 % (ref 0.5–7.8)
ERYTHROCYTE [DISTWIDTH] IN BLOOD BY AUTOMATED COUNT: 13 % (ref 11.9–14.6)
GLOBULIN SER CALC-MCNC: 4 G/DL (ref 2.8–4.5)
GLUCOSE SERPL-MCNC: 113 MG/DL (ref 65–100)
HCT VFR BLD AUTO: 38.1 % (ref 35.8–46.3)
HGB BLD-MCNC: 12.2 G/DL (ref 11.7–15.4)
IMM GRANULOCYTES # BLD AUTO: 0 K/UL (ref 0–0.5)
IMM GRANULOCYTES NFR BLD AUTO: 0 % (ref 0–5)
LYMPHOCYTES # BLD: 2 K/UL (ref 0.5–4.6)
LYMPHOCYTES NFR BLD: 30 % (ref 13–44)
MAGNESIUM SERPL-MCNC: 2.3 MG/DL (ref 1.8–2.4)
MCH RBC QN AUTO: 33.6 PG (ref 26.1–32.9)
MCHC RBC AUTO-ENTMCNC: 32 G/DL (ref 31.4–35)
MCV RBC AUTO: 105 FL (ref 82–102)
MONOCYTES # BLD: 0.7 K/UL (ref 0.1–1.3)
MONOCYTES NFR BLD: 11 % (ref 4–12)
NEUTS SEG # BLD: 3.9 K/UL (ref 1.7–8.2)
NEUTS SEG NFR BLD: 56 % (ref 43–78)
NRBC # BLD: 0 K/UL (ref 0–0.2)
PLATELET # BLD AUTO: 198 K/UL (ref 150–450)
PMV BLD AUTO: 8.9 FL (ref 9.4–12.3)
POTASSIUM SERPL-SCNC: 4.8 MMOL/L (ref 3.5–5.1)
PROT SERPL-MCNC: 7.6 G/DL (ref 6.3–8.2)
RBC # BLD AUTO: 3.63 M/UL (ref 4.05–5.2)
SODIUM SERPL-SCNC: 138 MMOL/L (ref 133–143)
WBC # BLD AUTO: 6.9 K/UL (ref 4.3–11.1)

## 2023-09-20 PROCEDURE — 99214 OFFICE O/P EST MOD 30 MIN: CPT | Performed by: OBSTETRICS & GYNECOLOGY

## 2023-09-20 PROCEDURE — 83735 ASSAY OF MAGNESIUM: CPT

## 2023-09-20 PROCEDURE — 85025 COMPLETE CBC W/AUTO DIFF WBC: CPT

## 2023-09-20 PROCEDURE — 1036F TOBACCO NON-USER: CPT | Performed by: OBSTETRICS & GYNECOLOGY

## 2023-09-20 PROCEDURE — 86304 IMMUNOASSAY TUMOR CA 125: CPT

## 2023-09-20 PROCEDURE — 1123F ACP DISCUSS/DSCN MKR DOCD: CPT | Performed by: OBSTETRICS & GYNECOLOGY

## 2023-09-20 PROCEDURE — 1090F PRES/ABSN URINE INCON ASSESS: CPT | Performed by: OBSTETRICS & GYNECOLOGY

## 2023-09-20 PROCEDURE — 36415 COLL VENOUS BLD VENIPUNCTURE: CPT

## 2023-09-20 PROCEDURE — 80053 COMPREHEN METABOLIC PANEL: CPT

## 2023-09-20 PROCEDURE — G8427 DOCREV CUR MEDS BY ELIG CLIN: HCPCS | Performed by: OBSTETRICS & GYNECOLOGY

## 2023-09-20 PROCEDURE — G8417 CALC BMI ABV UP PARAM F/U: HCPCS | Performed by: OBSTETRICS & GYNECOLOGY

## 2023-09-20 RX ORDER — OMEGA-3 FATTY ACIDS CAP DELAYED RELEASE 1000 MG 1000 MG
3000 CAPSULE DELAYED RELEASE ORAL
COMMUNITY

## 2023-09-20 ASSESSMENT — ANXIETY QUESTIONNAIRES
4. TROUBLE RELAXING: 0
GAD7 TOTAL SCORE: 0
6. BECOMING EASILY ANNOYED OR IRRITABLE: 0
1. FEELING NERVOUS, ANXIOUS, OR ON EDGE: 0
7. FEELING AFRAID AS IF SOMETHING AWFUL MIGHT HAPPEN: 0
5. BEING SO RESTLESS THAT IT IS HARD TO SIT STILL: 0
IF YOU CHECKED OFF ANY PROBLEMS ON THIS QUESTIONNAIRE, HOW DIFFICULT HAVE THESE PROBLEMS MADE IT FOR YOU TO DO YOUR WORK, TAKE CARE OF THINGS AT HOME, OR GET ALONG WITH OTHER PEOPLE: NOT DIFFICULT AT ALL
2. NOT BEING ABLE TO STOP OR CONTROL WORRYING: 0
3. WORRYING TOO MUCH ABOUT DIFFERENT THINGS: 0

## 2023-09-20 ASSESSMENT — PATIENT HEALTH QUESTIONNAIRE - PHQ9
1. LITTLE INTEREST OR PLEASURE IN DOING THINGS: 0
SUM OF ALL RESPONSES TO PHQ QUESTIONS 1-9: 1
10. IF YOU CHECKED OFF ANY PROBLEMS, HOW DIFFICULT HAVE THESE PROBLEMS MADE IT FOR YOU TO DO YOUR WORK, TAKE CARE OF THINGS AT HOME, OR GET ALONG WITH OTHER PEOPLE: 0
3. TROUBLE FALLING OR STAYING ASLEEP: 0
SUM OF ALL RESPONSES TO PHQ QUESTIONS 1-9: 1
7. TROUBLE CONCENTRATING ON THINGS, SUCH AS READING THE NEWSPAPER OR WATCHING TELEVISION: 0
SUM OF ALL RESPONSES TO PHQ QUESTIONS 1-9: 1
2. FEELING DOWN, DEPRESSED OR HOPELESS: 0
8. MOVING OR SPEAKING SO SLOWLY THAT OTHER PEOPLE COULD HAVE NOTICED. OR THE OPPOSITE, BEING SO FIGETY OR RESTLESS THAT YOU HAVE BEEN MOVING AROUND A LOT MORE THAN USUAL: 0
SUM OF ALL RESPONSES TO PHQ QUESTIONS 1-9: 1
9. THOUGHTS THAT YOU WOULD BE BETTER OFF DEAD, OR OF HURTING YOURSELF: 0
4. FEELING TIRED OR HAVING LITTLE ENERGY: 1
6. FEELING BAD ABOUT YOURSELF - OR THAT YOU ARE A FAILURE OR HAVE LET YOURSELF OR YOUR FAMILY DOWN: 0
SUM OF ALL RESPONSES TO PHQ9 QUESTIONS 1 & 2: 0
5. POOR APPETITE OR OVEREATING: 0

## 2023-09-20 NOTE — PATIENT INSTRUCTIONS
Patient Instructions from Today's Visit    Reason for Visit:  Follow up    Diagnosis Information:  [email or web address] [email or web address]-us/asco-answers-patient-education-materials/slbs-tomzqku-[email or web address]-sheets      Plan:  Doing well on Zejula    Follow Up:  Continue Zejula  Call us for anything  Office visit in 2 months with labs  Call us when you only have a month left of Zejula      Recent Lab Results:  Results for orders placed or performed during the hospital encounter of 09/20/23   CBC with Auto Differential   Result Value Ref Range    WBC 6.9 4.3 - 11.1 K/uL    RBC 3.63 (L) 4.05 - 5.2 M/uL    Hemoglobin 12.2 11.7 - 15.4 g/dL    Hematocrit 38.1 35.8 - 46.3 %    .0 (H) 82.0 - 102.0 FL    MCH 33.6 (H) 26.1 - 32.9 PG    MCHC 32.0 31.4 - 35.0 g/dL    RDW 13.0 11.9 - 14.6 %    Platelets 101 049 - 788 K/uL    MPV 8.9 (L) 9.4 - 12.3 FL    nRBC 0.00 0.0 - 0.2 K/uL    Neutrophils % 56 43 - 78 %    Lymphocytes % 30 13 - 44 %    Monocytes % 11 4.0 - 12.0 %    Eosinophils % 2 0.5 - 7.8 %    Basophils % 1 0.0 - 2.0 %    Immature Granulocytes 0 0.0 - 5.0 %    Neutrophils Absolute 3.9 1.7 - 8.2 K/UL    Lymphocytes Absolute 2.0 0.5 - 4.6 K/UL    Monocytes Absolute 0.7 0.1 - 1.3 K/UL    Eosinophils Absolute 0.2 0.0 - 0.8 K/UL    Basophils Absolute 0.0 0.0 - 0.2 K/UL    Absolute Immature Granulocyte 0.0 0.0 - 0.5 K/UL    Differential Type AUTOMATED     Comprehensive Metabolic Panel   Result Value Ref Range    Sodium 138 133 - 143 mmol/L    Potassium 4.8 3.5 - 5.1 mmol/L    Chloride 103 101 - 110 mmol/L    CO2 31 21 - 32 mmol/L    Anion Gap 4 2 - 11 mmol/L    Glucose 113 (H) 65 - 100 mg/dL    BUN 21 8 - 23 MG/DL    Creatinine 1.70 (H) 0.6 - 1.0 MG/DL    Est, Glom Filt Rate 29 (L) >60 ml/min/1.73m2    Calcium 9.3 8.3 - 10.4 MG/DL    Total Bilirubin 0.4 0.2 - 1.1 MG/DL    ALT 24 12 - 65 U/L    AST 17 15 - 37 U/L    Alk Phosphatase 130 50 - 136 U/L    Total Protein 7.6 6.3 - 8.2 g/dL    Albumin 3.6 3.2 - 4.6 g/dL    Globulin 4.0 2.8 -

## 2023-10-16 ENCOUNTER — OFFICE VISIT (OUTPATIENT)
Dept: INTERNAL MEDICINE CLINIC | Facility: CLINIC | Age: 88
End: 2023-10-16
Payer: MEDICARE

## 2023-10-16 VITALS
TEMPERATURE: 97.4 F | RESPIRATION RATE: 18 BRPM | OXYGEN SATURATION: 99 % | WEIGHT: 183.4 LBS | HEIGHT: 63 IN | BODY MASS INDEX: 32.5 KG/M2 | SYSTOLIC BLOOD PRESSURE: 160 MMHG | HEART RATE: 79 BPM | DIASTOLIC BLOOD PRESSURE: 67 MMHG

## 2023-10-16 DIAGNOSIS — Z00.00 MEDICARE ANNUAL WELLNESS VISIT, SUBSEQUENT: Primary | ICD-10-CM

## 2023-10-16 DIAGNOSIS — E11.22 TYPE 2 DIABETES MELLITUS WITH STAGE 3B CHRONIC KIDNEY DISEASE, WITHOUT LONG-TERM CURRENT USE OF INSULIN (HCC): ICD-10-CM

## 2023-10-16 DIAGNOSIS — N18.32 TYPE 2 DIABETES MELLITUS WITH STAGE 3B CHRONIC KIDNEY DISEASE, WITHOUT LONG-TERM CURRENT USE OF INSULIN (HCC): ICD-10-CM

## 2023-10-16 DIAGNOSIS — J44.9 CHRONIC OBSTRUCTIVE PULMONARY DISEASE, UNSPECIFIED COPD TYPE (HCC): ICD-10-CM

## 2023-10-16 DIAGNOSIS — I10 PRIMARY HYPERTENSION: ICD-10-CM

## 2023-10-16 DIAGNOSIS — C56.9 OVARY CANCER, UNSPECIFIED LATERALITY (HCC): ICD-10-CM

## 2023-10-16 PROCEDURE — 3044F HG A1C LEVEL LT 7.0%: CPT | Performed by: INTERNAL MEDICINE

## 2023-10-16 PROCEDURE — 1123F ACP DISCUSS/DSCN MKR DOCD: CPT | Performed by: INTERNAL MEDICINE

## 2023-10-16 PROCEDURE — G0439 PPPS, SUBSEQ VISIT: HCPCS | Performed by: INTERNAL MEDICINE

## 2023-10-16 PROCEDURE — G8484 FLU IMMUNIZE NO ADMIN: HCPCS | Performed by: INTERNAL MEDICINE

## 2023-10-16 RX ORDER — GLIMEPIRIDE 1 MG/1
1 TABLET ORAL DAILY
Qty: 90 TABLET | Refills: 2 | Status: SHIPPED | OUTPATIENT
Start: 2023-10-16 | End: 2025-02-11

## 2023-10-16 RX ORDER — LISINOPRIL 10 MG/1
10 TABLET ORAL DAILY
Qty: 90 TABLET | Refills: 3 | Status: SHIPPED | OUTPATIENT
Start: 2023-10-16

## 2023-10-16 ASSESSMENT — PATIENT HEALTH QUESTIONNAIRE - PHQ9
SUM OF ALL RESPONSES TO PHQ9 QUESTIONS 1 & 2: 0
SUM OF ALL RESPONSES TO PHQ QUESTIONS 1-9: 0
1. LITTLE INTEREST OR PLEASURE IN DOING THINGS: 0
2. FEELING DOWN, DEPRESSED OR HOPELESS: 0
SUM OF ALL RESPONSES TO PHQ QUESTIONS 1-9: 0

## 2023-10-16 NOTE — PATIENT INSTRUCTIONS
avocados, or nuts. Exercise raises this level, too. Fat    Fat is calorie dense and packs a lot of calories into a small amount of food. Even though fats should be limited due to their high calorie content, not all fats are bad. In fact, some fats are quite healthful. Fat can be broken down into four main types. The good-for-you fats are:   Monounsaturated fat  found in oils such as olive and canola, avocados, and nuts and natural nut butters; can decrease cholesterol levels, while keeping levels of HDL cholesterol high   Polyunsaturated fat  found in oils such as safflower, sunflower, soybean, corn, and sesame; can decrease total cholesterol and LDL cholesterol   Omega-3 fatty acids  particularly those found in fatty fish (such as salmon, trout, tuna, mackerel, herring, and sardines); can decrease risk of arrhythmias, decrease triglyceride levels, and slightly lower blood pressure   The fats that you want to limit are:   Saturated fat  found in animal products, many fast foods, and a few vegetables; increases total blood cholesterol, including LDL levels   Animal fats that are saturated include: butter, lard, whole-milk dairy products, meat fat, and poultry skin   Vegetable fats that are saturated include: hydrogenated shortening, palm oil, coconut oil, cocoa butter   Hydrogenated or trans fat  found in margarine and vegetable shortening, most shelf stable snack foods, and fried foods; increases LDL and decreases HDL     It is generally recommended that you limit your total fat for the day to less than 30% of your total calories. If you follow an 1800-calorie heart healthy diet, for example, this would mean 60 grams of fat or less per day. Saturated fat and trans fat in your diet raises your blood cholesterol the most, much more than dietary cholesterol does. For this reason, on a heart-healthy diet, less than 7% of your calories should come from saturated fat and ideally 0% from trans fat.  On an 1800-calorie

## 2023-11-28 NOTE — PROGRESS NOTES
chest CT    TECHNIQUE: Volume acquisition of the chest was obtained from the thoracic inlet to the diaphragm without intravenous contrast. Data was reconstructed at  3.75 mm and 1.25 mm. Axial, sagittal, and coronal images were constructed and reviewed. FINDINGS:  Lungs - Pleura: There is no evidence of pneumothorax or pleural effusion. Bronchiectasis is present bilaterally. Emphysema is noted bilaterally. Imreya - Mediastinum:  No evidence of hilar or mediastinal lymphadenopathy. Heart - Vascular:  Atherosclerotic calcification of the thoracic aorta and the coronary arteries. Avascular access port is present within the right chest. The catheter has been introduced via the right internal jugular vein with the tip of the catheter present at the SVC/right atrium junction. Upper Abdomen:  Within normal limits. Soft tissues - MSK:  Degenerative changes are present within the thoracic spine. Bones are osteopenic. Impression  1. Emphysema and bronchiectasis. Signed by: 10/18/2023 4:32 PM: Cristobal Smith MD, [facility]     PET Results (most recent):  PET CT SKULL BASE TO MID THIGH 03/29/2022    Narrative  PET/CT    Indication: Abdominal mass on outside CT    Radiopharmaceutical: 14.4 mCi F18-FDG, intravenously. Technique: Imaging was performed from the skull through the proximal thighs  using routine PET/CT acquisition protocol. Imaging was performed approximately  60 minutes post injection. Oral contrast was administered. Radiation dose  reduction techniques were used for this study:  Our CT scanners use one or all  of the following: Automated exposure control, adjustment of the mA and/or kVp  according to patient's size, iterative reconstruction. Serum glucose: 93 mg/dL prior to injection. Comparison studies: None available. Findings:    Head and Neck: No enlarged or hypermetabolic lymph nodes within the neck.     Chest: A lymph node in the right cardiophrenic fat is hypermetabolic,

## 2023-11-29 ENCOUNTER — HOSPITAL ENCOUNTER (OUTPATIENT)
Dept: LAB | Age: 88
Discharge: HOME | End: 2023-12-02
Payer: MEDICARE

## 2023-11-29 ENCOUNTER — OFFICE VISIT (OUTPATIENT)
Dept: ONCOLOGY | Age: 88
End: 2023-11-29
Payer: MEDICARE

## 2023-11-29 VITALS
OXYGEN SATURATION: 93 % | TEMPERATURE: 97.9 F | SYSTOLIC BLOOD PRESSURE: 95 MMHG | BODY MASS INDEX: 31.71 KG/M2 | WEIGHT: 179 LBS | RESPIRATION RATE: 18 BRPM | DIASTOLIC BLOOD PRESSURE: 74 MMHG | HEIGHT: 63 IN | HEART RATE: 64 BPM

## 2023-11-29 DIAGNOSIS — C56.9 OVARY CANCER, UNSPECIFIED LATERALITY (HCC): ICD-10-CM

## 2023-11-29 DIAGNOSIS — C56.9 OVARY CANCER, UNSPECIFIED LATERALITY (HCC): Primary | ICD-10-CM

## 2023-11-29 DIAGNOSIS — Z51.11 ENCOUNTER FOR ANTINEOPLASTIC CHEMOTHERAPY: ICD-10-CM

## 2023-11-29 DIAGNOSIS — R97.1 ELEVATED CANCER ANTIGEN 125 (CA 125): ICD-10-CM

## 2023-11-29 LAB
ALBUMIN SERPL-MCNC: 4.1 G/DL (ref 3.2–4.6)
ALBUMIN/GLOB SERPL: 1 (ref 0.4–1.6)
ALP SERPL-CCNC: 131 U/L (ref 50–136)
ALT SERPL-CCNC: 31 U/L (ref 12–65)
ANION GAP SERPL CALC-SCNC: 5 MMOL/L (ref 2–11)
AST SERPL-CCNC: 26 U/L (ref 15–37)
BASOPHILS # BLD: 0.1 K/UL (ref 0–0.2)
BASOPHILS NFR BLD: 1 % (ref 0–2)
BILIRUB SERPL-MCNC: 0.8 MG/DL (ref 0.2–1.1)
BUN SERPL-MCNC: 42 MG/DL (ref 8–23)
CALCIUM SERPL-MCNC: 9.4 MG/DL (ref 8.3–10.4)
CANCER AG125 SERPL-ACNC: 18 U/ML (ref 1.5–35)
CHLORIDE SERPL-SCNC: 106 MMOL/L (ref 101–110)
CO2 SERPL-SCNC: 27 MMOL/L (ref 21–32)
CREAT SERPL-MCNC: 2.4 MG/DL (ref 0.6–1)
DIFFERENTIAL METHOD BLD: ABNORMAL
EOSINOPHIL # BLD: 0.1 K/UL (ref 0–0.8)
EOSINOPHIL NFR BLD: 2 % (ref 0.5–7.8)
ERYTHROCYTE [DISTWIDTH] IN BLOOD BY AUTOMATED COUNT: 13.2 % (ref 11.9–14.6)
GLOBULIN SER CALC-MCNC: 4 G/DL (ref 2.8–4.5)
GLUCOSE SERPL-MCNC: 135 MG/DL (ref 65–100)
HCT VFR BLD AUTO: 39.3 % (ref 35.8–46.3)
HGB BLD-MCNC: 12.6 G/DL (ref 11.7–15.4)
IMM GRANULOCYTES # BLD AUTO: 0.1 K/UL (ref 0–0.5)
IMM GRANULOCYTES NFR BLD AUTO: 1 % (ref 0–5)
LYMPHOCYTES # BLD: 1.5 K/UL (ref 0.5–4.6)
LYMPHOCYTES NFR BLD: 20 % (ref 13–44)
MAGNESIUM SERPL-MCNC: 2.7 MG/DL (ref 1.8–2.4)
MCH RBC QN AUTO: 33 PG (ref 26.1–32.9)
MCHC RBC AUTO-ENTMCNC: 32.1 G/DL (ref 31.4–35)
MCV RBC AUTO: 102.9 FL (ref 82–102)
MONOCYTES # BLD: 0.7 K/UL (ref 0.1–1.3)
MONOCYTES NFR BLD: 9 % (ref 4–12)
NEUTS SEG # BLD: 5.2 K/UL (ref 1.7–8.2)
NEUTS SEG NFR BLD: 67 % (ref 43–78)
NRBC # BLD: 0 K/UL (ref 0–0.2)
PLATELET # BLD AUTO: 248 K/UL (ref 150–450)
PMV BLD AUTO: 8.8 FL (ref 9.4–12.3)
POTASSIUM SERPL-SCNC: 4.7 MMOL/L (ref 3.5–5.1)
PROT SERPL-MCNC: 8.1 G/DL (ref 6.3–8.2)
RBC # BLD AUTO: 3.82 M/UL (ref 4.05–5.2)
SODIUM SERPL-SCNC: 138 MMOL/L (ref 133–143)
WBC # BLD AUTO: 7.6 K/UL (ref 4.3–11.1)

## 2023-11-29 PROCEDURE — 36415 COLL VENOUS BLD VENIPUNCTURE: CPT

## 2023-11-29 PROCEDURE — G8417 CALC BMI ABV UP PARAM F/U: HCPCS | Performed by: OBSTETRICS & GYNECOLOGY

## 2023-11-29 PROCEDURE — 86304 IMMUNOASSAY TUMOR CA 125: CPT

## 2023-11-29 PROCEDURE — 83735 ASSAY OF MAGNESIUM: CPT

## 2023-11-29 PROCEDURE — G8427 DOCREV CUR MEDS BY ELIG CLIN: HCPCS | Performed by: OBSTETRICS & GYNECOLOGY

## 2023-11-29 PROCEDURE — 1090F PRES/ABSN URINE INCON ASSESS: CPT | Performed by: OBSTETRICS & GYNECOLOGY

## 2023-11-29 PROCEDURE — 80053 COMPREHEN METABOLIC PANEL: CPT

## 2023-11-29 PROCEDURE — 1036F TOBACCO NON-USER: CPT | Performed by: OBSTETRICS & GYNECOLOGY

## 2023-11-29 PROCEDURE — G8484 FLU IMMUNIZE NO ADMIN: HCPCS | Performed by: OBSTETRICS & GYNECOLOGY

## 2023-11-29 PROCEDURE — 85025 COMPLETE CBC W/AUTO DIFF WBC: CPT

## 2023-11-29 PROCEDURE — 1123F ACP DISCUSS/DSCN MKR DOCD: CPT | Performed by: OBSTETRICS & GYNECOLOGY

## 2023-11-29 PROCEDURE — 99214 OFFICE O/P EST MOD 30 MIN: CPT | Performed by: OBSTETRICS & GYNECOLOGY

## 2023-11-29 ASSESSMENT — PATIENT HEALTH QUESTIONNAIRE - PHQ9
SUM OF ALL RESPONSES TO PHQ QUESTIONS 1-9: 0
2. FEELING DOWN, DEPRESSED OR HOPELESS: 0
SUM OF ALL RESPONSES TO PHQ QUESTIONS 1-9: 0
SUM OF ALL RESPONSES TO PHQ9 QUESTIONS 1 & 2: 0
SUM OF ALL RESPONSES TO PHQ QUESTIONS 1-9: 0
1. LITTLE INTEREST OR PLEASURE IN DOING THINGS: 0
SUM OF ALL RESPONSES TO PHQ QUESTIONS 1-9: 0

## 2023-11-29 NOTE — PATIENT INSTRUCTIONS
Treatment Summary has been discussed and given to patient: N/A        -------------------------------------------------------------------------------------------------------------------  Please call our office at [phone] if you have any  of the following symptoms:   Fever of 100.5 or greater  Chills  Shortness of breath  Swelling or pain in one leg    After office hours an answering service is available and will contact a provider for emergencies or if you are experiencing any of the above symptoms. Patient does express an interest in My Chart. My Chart log in information explained on the after visit summary printout at the [address] desk.     Mansoor Albarado MA

## 2024-01-30 DIAGNOSIS — C56.9 OVARY CANCER, UNSPECIFIED LATERALITY (HCC): Primary | ICD-10-CM

## 2024-01-30 ASSESSMENT — ENCOUNTER SYMPTOMS
ALLERGIC/IMMUNOLOGIC NEGATIVE: 1
GASTROINTESTINAL NEGATIVE: 1
EYES NEGATIVE: 1
RESPIRATORY NEGATIVE: 1

## 2024-01-30 NOTE — PROGRESS NOTES
display                      Blood pressure (!) 144/67, pulse 84, temperature 98.4 °F (36.9 °C), temperature source Oral, resp. rate 12, height 1.588 m (5' 2.5\"), weight 82.6 kg (182 lb), SpO2 96 %.     Physical Exam  Vitals and nursing note reviewed. Exam conducted with a chaperone present.   Constitutional:       Appearance: Normal appearance.   HENT:      Head: Normocephalic and atraumatic.      Nose: No congestion.   Cardiovascular:      Rate and Rhythm: Normal rate and regular rhythm.      Pulses: Normal pulses.      Heart sounds: Normal heart sounds.   Pulmonary:      Effort: Pulmonary effort is normal. No respiratory distress.      Breath sounds: No stridor. No wheezing or rhonchi.   Abdominal:      General: Abdomen is flat. There is no distension.      Palpations: Abdomen is soft. There is no mass.   Musculoskeletal:      Cervical back: Normal range of motion and neck supple.   Skin:     General: Skin is warm and dry.   Neurological:      General: No focal deficit present.      Mental Status: She is alert and oriented to person, place, and time.   Psychiatric:         Mood and Affect: Mood normal.         Behavior: Behavior normal.         Thought Content: Thought content normal.         Judgment: Judgment normal.         Labs        Recent Results (from the past 48 hour(s))       Collection Time: 01/31/24  1:46 PM   Result Value Ref Range     15 1.5 - 35.0 U/mL   Magnesium    Collection Time: 01/31/24  1:46 PM   Result Value Ref Range    Magnesium 2.4 1.8 - 2.4 mg/dL   Comprehensive Metabolic Panel    Collection Time: 01/31/24  1:46 PM   Result Value Ref Range    Sodium 136 136 - 146 mmol/L    Potassium 4.3 3.5 - 5.1 mmol/L    Chloride 105 103 - 113 mmol/L    CO2 28 21 - 32 mmol/L    Anion Gap 3 2 - 11 mmol/L    Glucose 101 (H) 65 - 100 mg/dL    BUN 36 (H) 8 - 23 MG/DL    Creatinine 1.50 (H) 0.6 - 1.0 MG/DL    Est, Glom Filt Rate 34 (L) >60 ml/min/1.73m2    Calcium 9.0 8.3 - 10.4 MG/DL    Total

## 2024-01-31 ENCOUNTER — OFFICE VISIT (OUTPATIENT)
Dept: ONCOLOGY | Age: 89
End: 2024-01-31
Payer: MEDICARE

## 2024-01-31 ENCOUNTER — HOSPITAL ENCOUNTER (OUTPATIENT)
Dept: LAB | Age: 89
Discharge: HOME | End: 2024-02-03
Payer: MEDICARE

## 2024-01-31 ENCOUNTER — HOSPITAL ENCOUNTER (OUTPATIENT)
Dept: INFUSION THERAPY | Age: 89
Discharge: HOME | End: 2024-01-31
Payer: MEDICARE

## 2024-01-31 VITALS
BODY MASS INDEX: 32.25 KG/M2 | HEIGHT: 63 IN | SYSTOLIC BLOOD PRESSURE: 144 MMHG | HEART RATE: 84 BPM | DIASTOLIC BLOOD PRESSURE: 67 MMHG | WEIGHT: 182 LBS | OXYGEN SATURATION: 96 % | RESPIRATION RATE: 12 BRPM | TEMPERATURE: 98.4 F

## 2024-01-31 DIAGNOSIS — C56.9 OVARY CANCER, UNSPECIFIED LATERALITY (HCC): Primary | ICD-10-CM

## 2024-01-31 DIAGNOSIS — C54.9 MIXED MULLERIAN TUMOR (HCC): ICD-10-CM

## 2024-01-31 DIAGNOSIS — C54.9 MALIGNANT NEOPLASM OF CORPUS UTERI, UNSPECIFIED (HCC): ICD-10-CM

## 2024-01-31 DIAGNOSIS — C56.9 OVARY CANCER, UNSPECIFIED LATERALITY (HCC): ICD-10-CM

## 2024-01-31 LAB
ALBUMIN SERPL-MCNC: 3.8 G/DL (ref 3.2–4.6)
ALBUMIN/GLOB SERPL: 0.9 (ref 0.4–1.6)
ALP SERPL-CCNC: 130 U/L (ref 50–136)
ALT SERPL-CCNC: 29 U/L (ref 12–65)
ANION GAP SERPL CALC-SCNC: 3 MMOL/L (ref 2–11)
AST SERPL-CCNC: 21 U/L (ref 15–37)
BASOPHILS # BLD: 0.1 K/UL (ref 0–0.2)
BASOPHILS NFR BLD: 1 % (ref 0–2)
BILIRUB SERPL-MCNC: 0.5 MG/DL (ref 0.2–1.1)
BUN SERPL-MCNC: 36 MG/DL (ref 8–23)
CALCIUM SERPL-MCNC: 9 MG/DL (ref 8.3–10.4)
CANCER AG125 SERPL-ACNC: 15 U/ML (ref 1.5–35)
CHLORIDE SERPL-SCNC: 105 MMOL/L (ref 103–113)
CO2 SERPL-SCNC: 28 MMOL/L (ref 21–32)
CREAT SERPL-MCNC: 1.5 MG/DL (ref 0.6–1)
DIFFERENTIAL METHOD BLD: ABNORMAL
EOSINOPHIL # BLD: 0.1 K/UL (ref 0–0.8)
EOSINOPHIL NFR BLD: 1 % (ref 0.5–7.8)
ERYTHROCYTE [DISTWIDTH] IN BLOOD BY AUTOMATED COUNT: 12.5 % (ref 11.9–14.6)
GLOBULIN SER CALC-MCNC: 4.2 G/DL (ref 2.8–4.5)
GLUCOSE SERPL-MCNC: 101 MG/DL (ref 65–100)
HCT VFR BLD AUTO: 41.1 % (ref 35.8–46.3)
HGB BLD-MCNC: 12.9 G/DL (ref 11.7–15.4)
IMM GRANULOCYTES # BLD AUTO: 0.1 K/UL (ref 0–0.5)
IMM GRANULOCYTES NFR BLD AUTO: 1 % (ref 0–5)
LYMPHOCYTES # BLD: 2.1 K/UL (ref 0.5–4.6)
LYMPHOCYTES NFR BLD: 28 % (ref 13–44)
MAGNESIUM SERPL-MCNC: 2.4 MG/DL (ref 1.8–2.4)
MCH RBC QN AUTO: 32.3 PG (ref 26.1–32.9)
MCHC RBC AUTO-ENTMCNC: 31.4 G/DL (ref 31.4–35)
MCV RBC AUTO: 103 FL (ref 82–102)
MONOCYTES # BLD: 0.9 K/UL (ref 0.1–1.3)
MONOCYTES NFR BLD: 12 % (ref 4–12)
NEUTS SEG # BLD: 4.4 K/UL (ref 1.7–8.2)
NEUTS SEG NFR BLD: 57 % (ref 43–78)
NRBC # BLD: 0 K/UL (ref 0–0.2)
PLATELET # BLD AUTO: 239 K/UL (ref 150–450)
PMV BLD AUTO: 9 FL (ref 9.4–12.3)
POTASSIUM SERPL-SCNC: 4.3 MMOL/L (ref 3.5–5.1)
PROT SERPL-MCNC: 8 G/DL (ref 6.3–8.2)
RBC # BLD AUTO: 3.99 M/UL (ref 4.05–5.2)
SODIUM SERPL-SCNC: 136 MMOL/L (ref 136–146)
WBC # BLD AUTO: 7.5 K/UL (ref 4.3–11.1)

## 2024-01-31 PROCEDURE — 96523 IRRIG DRUG DELIVERY DEVICE: CPT

## 2024-01-31 PROCEDURE — 99213 OFFICE O/P EST LOW 20 MIN: CPT | Performed by: OBSTETRICS & GYNECOLOGY

## 2024-01-31 PROCEDURE — 80053 COMPREHEN METABOLIC PANEL: CPT

## 2024-01-31 PROCEDURE — 36415 COLL VENOUS BLD VENIPUNCTURE: CPT

## 2024-01-31 PROCEDURE — 86304 IMMUNOASSAY TUMOR CA 125: CPT

## 2024-01-31 PROCEDURE — 83735 ASSAY OF MAGNESIUM: CPT

## 2024-01-31 PROCEDURE — 1036F TOBACCO NON-USER: CPT | Performed by: OBSTETRICS & GYNECOLOGY

## 2024-01-31 PROCEDURE — G8484 FLU IMMUNIZE NO ADMIN: HCPCS | Performed by: OBSTETRICS & GYNECOLOGY

## 2024-01-31 PROCEDURE — 85025 COMPLETE CBC W/AUTO DIFF WBC: CPT

## 2024-01-31 PROCEDURE — G8417 CALC BMI ABV UP PARAM F/U: HCPCS | Performed by: OBSTETRICS & GYNECOLOGY

## 2024-01-31 PROCEDURE — 1123F ACP DISCUSS/DSCN MKR DOCD: CPT | Performed by: OBSTETRICS & GYNECOLOGY

## 2024-01-31 PROCEDURE — 1090F PRES/ABSN URINE INCON ASSESS: CPT | Performed by: OBSTETRICS & GYNECOLOGY

## 2024-01-31 PROCEDURE — G8428 CUR MEDS NOT DOCUMENT: HCPCS | Performed by: OBSTETRICS & GYNECOLOGY

## 2024-01-31 RX ORDER — ACETAMINOPHEN 160 MG
TABLET,DISINTEGRATING ORAL
COMMUNITY

## 2024-01-31 ASSESSMENT — PATIENT HEALTH QUESTIONNAIRE - PHQ9
SUM OF ALL RESPONSES TO PHQ QUESTIONS 1-9: 0
SUM OF ALL RESPONSES TO PHQ QUESTIONS 1-9: 0
SUM OF ALL RESPONSES TO PHQ9 QUESTIONS 1 & 2: 0
2. FEELING DOWN, DEPRESSED OR HOPELESS: 0
1. LITTLE INTEREST OR PLEASURE IN DOING THINGS: 0
SUM OF ALL RESPONSES TO PHQ QUESTIONS 1-9: 0
SUM OF ALL RESPONSES TO PHQ QUESTIONS 1-9: 0

## 2024-01-31 NOTE — PATIENT INSTRUCTIONS
Patient Instructions from Today's Visit    Reason for Visit:      Diagnosis Information:  https://www.cancer.net/about-us/asco-answers-patient-education-materials/ktdf-vstfmen-pxgw-sheets      Plan:  Plan to see us every 3 months for port flush and   We will leave the port in a year  Scan in 3 months      Follow Up:  Office visit in 3 months with lab prior    Recent Lab Results:  Results for orders placed or performed during the hospital encounter of 01/31/24      Result Value Ref Range     15 1.5 - 35.0 U/mL   Magnesium   Result Value Ref Range    Magnesium 2.4 1.8 - 2.4 mg/dL   Comprehensive Metabolic Panel   Result Value Ref Range    Sodium 136 136 - 146 mmol/L    Potassium 4.3 3.5 - 5.1 mmol/L    Chloride 105 103 - 113 mmol/L    CO2 28 21 - 32 mmol/L    Anion Gap 3 2 - 11 mmol/L    Glucose 101 (H) 65 - 100 mg/dL    BUN 36 (H) 8 - 23 MG/DL    Creatinine 1.50 (H) 0.6 - 1.0 MG/DL    Est, Glom Filt Rate 34 (L) >60 ml/min/1.73m2    Calcium 9.0 8.3 - 10.4 MG/DL    Total Bilirubin 0.5 0.2 - 1.1 MG/DL    ALT 29 12 - 65 U/L    AST 21 15 - 37 U/L    Alk Phosphatase 130 50 - 136 U/L    Total Protein 8.0 6.3 - 8.2 g/dL    Albumin 3.8 3.2 - 4.6 g/dL    Globulin 4.2 2.8 - 4.5 g/dL    Albumin/Globulin Ratio 0.9 0.4 - 1.6     CBC with Auto Differential   Result Value Ref Range    WBC 7.5 4.3 - 11.1 K/uL    RBC 3.99 (L) 4.05 - 5.2 M/uL    Hemoglobin 12.9 11.7 - 15.4 g/dL    Hematocrit 41.1 35.8 - 46.3 %    .0 (H) 82.0 - 102.0 FL    MCH 32.3 26.1 - 32.9 PG    MCHC 31.4 31.4 - 35.0 g/dL    RDW 12.5 11.9 - 14.6 %    Platelets 239 150 - 450 K/uL    MPV 9.0 (L) 9.4 - 12.3 FL    nRBC 0.00 0.0 - 0.2 K/uL    Neutrophils % 57 43 - 78 %    Lymphocytes % 28 13 - 44 %    Monocytes % 12 4.0 - 12.0 %    Eosinophils % 1 0.5 - 7.8 %    Basophils % 1 0.0 - 2.0 %    Immature Granulocytes 1 0.0 - 5.0 %    Neutrophils Absolute 4.4 1.7 - 8.2 K/UL    Lymphocytes Absolute 2.1 0.5 - 4.6 K/UL    Monocytes Absolute 0.9 0.1 -

## 2024-01-31 NOTE — PROGRESS NOTES
Arrived to the Infusion Center.  Port accessed and flushed per protocol.   Provided education on the importance of coming for port flushes.     Patient instructed to report any side effects to ordering provider.  Patient tolerated well.   Patient aware of next infusion appointment on 6/5/2024 (date) at 1300 (time).  Discharged ambulatory.

## 2024-02-22 ENCOUNTER — OFFICE VISIT (OUTPATIENT)
Dept: INTERNAL MEDICINE CLINIC | Facility: CLINIC | Age: 89
End: 2024-02-22
Payer: MEDICARE

## 2024-02-22 VITALS
HEART RATE: 86 BPM | RESPIRATION RATE: 22 BRPM | BODY MASS INDEX: 33.49 KG/M2 | SYSTOLIC BLOOD PRESSURE: 138 MMHG | DIASTOLIC BLOOD PRESSURE: 62 MMHG | OXYGEN SATURATION: 94 % | HEIGHT: 63 IN | TEMPERATURE: 97.7 F | WEIGHT: 189 LBS

## 2024-02-22 DIAGNOSIS — N18.4 CKD (CHRONIC KIDNEY DISEASE) STAGE 4, GFR 15-29 ML/MIN (HCC): ICD-10-CM

## 2024-02-22 DIAGNOSIS — E11.22 TYPE 2 DIABETES MELLITUS WITH STAGE 3B CHRONIC KIDNEY DISEASE, WITHOUT LONG-TERM CURRENT USE OF INSULIN (HCC): Primary | ICD-10-CM

## 2024-02-22 DIAGNOSIS — J44.9 CHRONIC OBSTRUCTIVE PULMONARY DISEASE, UNSPECIFIED COPD TYPE (HCC): ICD-10-CM

## 2024-02-22 DIAGNOSIS — D68.8 OTHER SPECIFIED COAGULATION DEFECTS (HCC): ICD-10-CM

## 2024-02-22 DIAGNOSIS — N18.32 TYPE 2 DIABETES MELLITUS WITH STAGE 3B CHRONIC KIDNEY DISEASE, WITHOUT LONG-TERM CURRENT USE OF INSULIN (HCC): Primary | ICD-10-CM

## 2024-02-22 LAB — 25(OH)D3 SERPL-MCNC: 23.8 NG/ML (ref 30–100)

## 2024-02-22 PROCEDURE — G8417 CALC BMI ABV UP PARAM F/U: HCPCS | Performed by: INTERNAL MEDICINE

## 2024-02-22 PROCEDURE — G8427 DOCREV CUR MEDS BY ELIG CLIN: HCPCS | Performed by: INTERNAL MEDICINE

## 2024-02-22 PROCEDURE — 1123F ACP DISCUSS/DSCN MKR DOCD: CPT | Performed by: INTERNAL MEDICINE

## 2024-02-22 PROCEDURE — 1036F TOBACCO NON-USER: CPT | Performed by: INTERNAL MEDICINE

## 2024-02-22 PROCEDURE — G8484 FLU IMMUNIZE NO ADMIN: HCPCS | Performed by: INTERNAL MEDICINE

## 2024-02-22 PROCEDURE — 99213 OFFICE O/P EST LOW 20 MIN: CPT | Performed by: INTERNAL MEDICINE

## 2024-02-22 PROCEDURE — 1090F PRES/ABSN URINE INCON ASSESS: CPT | Performed by: INTERNAL MEDICINE

## 2024-02-22 PROCEDURE — 3023F SPIROM DOC REV: CPT | Performed by: INTERNAL MEDICINE

## 2024-02-22 PROCEDURE — 3044F HG A1C LEVEL LT 7.0%: CPT | Performed by: INTERNAL MEDICINE

## 2024-02-22 NOTE — PROGRESS NOTES
Conjunctivae normal.   Cardiovascular:      Rate and Rhythm: Normal rate and regular rhythm.   Pulmonary:      Effort: Pulmonary effort is normal.   Abdominal:      General: Bowel sounds are normal.      Palpations: Abdomen is soft.      Tenderness: There is no abdominal tenderness.   Skin:     General: Skin is warm and dry.   Neurological:      Mental Status: She is alert. Mental status is at baseline.             Assessment & Plan    There are no diagnoses linked to this encounter.       Encounter Diagnoses   Name Primary?    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      Orders Placed This Encounter   Procedures    Hemoglobin A1C     Standing Status:   Future     Number of Occurrences:   1     Standing Expiration Date:   2/22/2025    Vitamin D 25 Hydroxy     Standing Status:   Future     Number of Occurrences:   1     Standing Expiration Date:   2/22/2025       Discussed the importance of regular exercise and a healthy diet.   Encouraged activity as tolerated  Chronic medical issues are stable. No change in medications.    Return in about 6 months (around 8/22/2024) for routine follow up of chronic medical issues, and as needed for new or worsening problems.        Rosa Delacruz MD

## 2024-02-23 LAB
EST. AVERAGE GLUCOSE BLD GHB EST-MCNC: 120 MG/DL
HBA1C MFR BLD: 5.8 % (ref 4.8–5.6)

## 2024-02-23 RX ORDER — ERGOCALCIFEROL 1.25 MG/1
50000 CAPSULE ORAL WEEKLY
Qty: 4 CAPSULE | Refills: 0 | Status: SHIPPED | OUTPATIENT
Start: 2024-02-23

## 2024-02-23 NOTE — RESULT ENCOUNTER NOTE
Diabetes control is better.   Vitamin D level is still low.   Recommend she takes Vitamin D 50,000 once a week. Then continue with daily over the counter Vit D3 2000 [employer] units  daily  Rosa Delacruz MD

## 2024-02-29 ASSESSMENT — ENCOUNTER SYMPTOMS
COUGH: 0
BACK PAIN: 1
WHEEZING: 0

## 2024-05-28 ENCOUNTER — HOSPITAL ENCOUNTER (OUTPATIENT)
Dept: CT IMAGING | Age: 89
Discharge: HOME | End: 2024-05-31
Attending: OBSTETRICS & GYNECOLOGY
Payer: MEDICARE

## 2024-05-28 DIAGNOSIS — C54.9 MALIGNANT NEOPLASM OF CORPUS UTERI, UNSPECIFIED (HCC): ICD-10-CM

## 2024-05-28 DIAGNOSIS — C54.9 MIXED MULLERIAN TUMOR (HCC): ICD-10-CM

## 2024-05-28 DIAGNOSIS — C56.9 OVARY CANCER, UNSPECIFIED LATERALITY (HCC): ICD-10-CM

## 2024-05-28 LAB — CREAT BLD-MCNC: 1.34 MG/DL (ref 0.8–1.5)

## 2024-05-28 PROCEDURE — 82565 ASSAY OF CREATININE: CPT

## 2024-05-28 PROCEDURE — 74177 CT ABD & PELVIS W/CONTRAST: CPT

## 2024-05-28 PROCEDURE — 6360000004 HC RX CONTRAST MEDICATION: Performed by: OBSTETRICS & GYNECOLOGY

## 2024-05-28 RX ADMIN — DIATRIZOATE MEGLUMINE AND DIATRIZOATE SODIUM 15 ML: 660; 100 LIQUID ORAL; RECTAL at 10:07

## 2024-05-28 RX ADMIN — IOPAMIDOL 100 ML: 755 INJECTION, SOLUTION INTRAVENOUS at 10:07

## 2024-06-04 DIAGNOSIS — C56.9 OVARY CANCER, UNSPECIFIED LATERALITY (HCC): Primary | ICD-10-CM

## 2024-06-05 ENCOUNTER — OFFICE VISIT (OUTPATIENT)
Dept: ONCOLOGY | Age: 89
End: 2024-06-05
Payer: MEDICARE

## 2024-06-05 ENCOUNTER — HOSPITAL ENCOUNTER (OUTPATIENT)
Dept: LAB | Age: 89
Discharge: HOME | End: 2024-06-08
Payer: MEDICARE

## 2024-06-05 VITALS
BODY MASS INDEX: 33.13 KG/M2 | WEIGHT: 180 LBS | DIASTOLIC BLOOD PRESSURE: 52 MMHG | TEMPERATURE: 98.1 F | SYSTOLIC BLOOD PRESSURE: 127 MMHG | HEART RATE: 79 BPM | HEIGHT: 62 IN

## 2024-06-05 DIAGNOSIS — C56.9 OVARY CANCER, UNSPECIFIED LATERALITY (HCC): Primary | ICD-10-CM

## 2024-06-05 DIAGNOSIS — C56.9 OVARY CANCER, UNSPECIFIED LATERALITY (HCC): ICD-10-CM

## 2024-06-05 LAB — CANCER AG125 SERPL-ACNC: 25 U/ML (ref 0–38)

## 2024-06-05 PROCEDURE — 1123F ACP DISCUSS/DSCN MKR DOCD: CPT | Performed by: OBSTETRICS & GYNECOLOGY

## 2024-06-05 PROCEDURE — 36591 DRAW BLOOD OFF VENOUS DEVICE: CPT

## 2024-06-05 PROCEDURE — G8427 DOCREV CUR MEDS BY ELIG CLIN: HCPCS | Performed by: OBSTETRICS & GYNECOLOGY

## 2024-06-05 PROCEDURE — 99213 OFFICE O/P EST LOW 20 MIN: CPT | Performed by: OBSTETRICS & GYNECOLOGY

## 2024-06-05 PROCEDURE — 1090F PRES/ABSN URINE INCON ASSESS: CPT | Performed by: OBSTETRICS & GYNECOLOGY

## 2024-06-05 PROCEDURE — 2580000003 HC RX 258: Performed by: INTERNAL MEDICINE

## 2024-06-05 PROCEDURE — 6360000002 HC RX W HCPCS: Performed by: INTERNAL MEDICINE

## 2024-06-05 PROCEDURE — G8417 CALC BMI ABV UP PARAM F/U: HCPCS | Performed by: OBSTETRICS & GYNECOLOGY

## 2024-06-05 PROCEDURE — 1036F TOBACCO NON-USER: CPT | Performed by: OBSTETRICS & GYNECOLOGY

## 2024-06-05 PROCEDURE — 86304 IMMUNOASSAY TUMOR CA 125: CPT

## 2024-06-05 RX ORDER — HEPARIN 100 UNIT/ML
300 SYRINGE INTRAVENOUS PRN
Status: DISCONTINUED | OUTPATIENT
Start: 2024-06-05 | End: 2024-06-09 | Stop reason: HOSPADM

## 2024-06-05 RX ORDER — SODIUM CHLORIDE 0.9 % (FLUSH) 0.9 %
5-40 SYRINGE (ML) INJECTION PRN
Status: ACTIVE | OUTPATIENT
Start: 2024-06-05 | End: 2024-06-06

## 2024-06-05 RX ADMIN — HEPARIN 300 UNITS: 100 SYRINGE at 12:56

## 2024-06-05 RX ADMIN — SODIUM CHLORIDE, PRESERVATIVE FREE 10 ML: 5 INJECTION INTRAVENOUS at 12:55

## 2024-06-05 ASSESSMENT — PATIENT HEALTH QUESTIONNAIRE - PHQ9
1. LITTLE INTEREST OR PLEASURE IN DOING THINGS: NOT AT ALL
SUM OF ALL RESPONSES TO PHQ9 QUESTIONS 1 & 2: 0
SUM OF ALL RESPONSES TO PHQ QUESTIONS 1-9: 0
2. FEELING DOWN, DEPRESSED OR HOPELESS: NOT AT ALL
SUM OF ALL RESPONSES TO PHQ QUESTIONS 1-9: 0

## 2024-06-05 ASSESSMENT — ENCOUNTER SYMPTOMS
RESPIRATORY NEGATIVE: 1
ALLERGIC/IMMUNOLOGIC NEGATIVE: 1
GASTROINTESTINAL NEGATIVE: 1
EYES NEGATIVE: 1

## 2024-06-05 NOTE — PROGRESS NOTES
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 who presents today for scheduled visit.     Elevated CA-125 at dx.    Possible recent hx GI perf/diverticulitis?, imaging 3/2022. Only sx was rectal bleeding, resolved.   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 Gordon, on nocturnal home  O2), NIDDM, htn, OA, right ankle surgery after fracture, hospitalization 2/8/22 for Covid-19 infection, UTI, dehydration.       More recently seen by them and underwent PET scan after a more recent CT abd (reportedly done for abdominal discomfort and blood per rectum) reportedly had shown abdominal mass.  PET scan revealed hypermetabolic multiple abdominal masses as well as right  cardiophrenic mass concerning for malignancy including lymphoma.  A hypermetabolic 4.1 cm fluid and gas containing collection located at rectosigmoid junction and uterine fundus concerning for possible abscess also noted.  A mildly hypermetabolic consolidative  appearing opacities in the right greater than left lung most consistent with subacute infection/inflammatory process also noted.        Denies any recent fevers or drenching night sweats, appears non-toxic.       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 She has no GI//PULM/CV/Neuro complaints

## 2024-06-05 NOTE — PROGRESS NOTES
Patient to port lab for port access and lab draw. Port accessed using 20g 0.75\" griffiths needle without difficulty. Labs drawn from port and port flushed and locked with Heparin. Port de-accessed, dressing applied. Patient tolerated well. Discharged ambulatory.

## 2024-06-05 NOTE — PATIENT INSTRUCTIONS
Patient Information from Today's Visit    The members of your [facility] are listed below:    Physician Provider: Adi Sidhu Gynecologic Oncologist  Advanced Practice Clinician: GENET Evangelista  Registered Nurse:   Nurse Navigator: Hannah VILLEGAS RN  Medical Assistant: Prudence BAILON MA  :Tressa ERNANDEZ  Supportive Care Services: Salome ANDERSON LMSW    Diagnosis: ovarian cancer    Follow Up Instructions:   As long as ca 125 is normal we will have port removed. We will call you if its abnormal  You will need monitored with ca 125 in 3 months. You can be monitored here with Dr Villalba    Reviewed scan        Treatment Summary has been discussed and given to patient:No      Current Labs: still pending            Please refer to After Visit Summary or MyChart for upcoming appointment information. Please call our office for rescheduling needs at least 24 hours before your scheduled appointment time.If you have any questions regarding your upcoming schedule please reach out to your care team through [employer] or call [phone].     Please notify your assigned Nurse Navigator of any unplanned hospital admissions or Emergency Room visits within 24 hours of discharge.    -------------------------------------------------------------------------------------------------------------------  Please call our office at [phone] if you have any  of the following symptoms:   Fever of 100.5 or greater  Chills  Shortness of breath  Swelling or pain in one leg    After office hours an answering service is available and will contact a provider for emergencies or if you are experiencing any of the above symptoms.        Hannah Seymour RN, BSN

## 2024-06-06 ENCOUNTER — TELEPHONE (OUTPATIENT)
Dept: ONCOLOGY | Age: 89
End: 2024-06-06

## 2024-06-06 DIAGNOSIS — C56.9 OVARY CANCER, UNSPECIFIED LATERALITY (HCC): Primary | ICD-10-CM

## 2024-06-06 NOTE — TELEPHONE ENCOUNTER
Per Dr Sidhu, called pt . Dr Sidhu advised to leave port in longer. Referral to Dr Villalba for surveillance. OV in 3 months with lab and port flush. Pt voiced understanding.

## 2024-06-24 ENCOUNTER — OFFICE VISIT (OUTPATIENT)
Dept: INTERNAL MEDICINE CLINIC | Facility: CLINIC | Age: 89
End: 2024-06-24
Payer: MEDICARE

## 2024-06-24 VITALS
BODY MASS INDEX: 33.49 KG/M2 | OXYGEN SATURATION: 95 % | HEART RATE: 77 BPM | HEIGHT: 63 IN | DIASTOLIC BLOOD PRESSURE: 41 MMHG | WEIGHT: 189 LBS | TEMPERATURE: 97 F | SYSTOLIC BLOOD PRESSURE: 141 MMHG

## 2024-06-24 DIAGNOSIS — I10 PRIMARY HYPERTENSION: ICD-10-CM

## 2024-06-24 DIAGNOSIS — E87.5 HYPERKALEMIA: ICD-10-CM

## 2024-06-24 DIAGNOSIS — N18.32 TYPE 2 DIABETES MELLITUS WITH STAGE 3B CHRONIC KIDNEY DISEASE, WITHOUT LONG-TERM CURRENT USE OF INSULIN (HCC): Primary | ICD-10-CM

## 2024-06-24 DIAGNOSIS — E11.22 TYPE 2 DIABETES MELLITUS WITH STAGE 3B CHRONIC KIDNEY DISEASE, WITHOUT LONG-TERM CURRENT USE OF INSULIN (HCC): Primary | ICD-10-CM

## 2024-06-24 DIAGNOSIS — N18.4 CKD (CHRONIC KIDNEY DISEASE) STAGE 4, GFR 15-29 ML/MIN (HCC): ICD-10-CM

## 2024-06-24 DIAGNOSIS — E55.9 VITAMIN D DEFICIENCY: ICD-10-CM

## 2024-06-24 LAB
25(OH)D3 SERPL-MCNC: 54.4 NG/ML (ref 30–100)
ANION GAP SERPL CALC-SCNC: 10 MMOL/L (ref 9–18)
BUN SERPL-MCNC: 28 MG/DL (ref 8–23)
CALCIUM SERPL-MCNC: 9.9 MG/DL (ref 8.8–10.2)
CHLORIDE SERPL-SCNC: 101 MMOL/L (ref 98–107)
CO2 SERPL-SCNC: 26 MMOL/L (ref 20–28)
CREAT SERPL-MCNC: 1.3 MG/DL (ref 0.6–1.1)
EST. AVERAGE GLUCOSE BLD GHB EST-MCNC: 124 MG/DL
GLUCOSE SERPL-MCNC: 82 MG/DL (ref 70–99)
HBA1C MFR BLD: 5.9 % (ref 0–5.6)
POTASSIUM SERPL-SCNC: 6 MMOL/L (ref 3.5–5.1)
SODIUM SERPL-SCNC: 137 MMOL/L (ref 136–145)

## 2024-06-24 PROCEDURE — 3044F HG A1C LEVEL LT 7.0%: CPT | Performed by: INTERNAL MEDICINE

## 2024-06-24 PROCEDURE — 1090F PRES/ABSN URINE INCON ASSESS: CPT | Performed by: INTERNAL MEDICINE

## 2024-06-24 PROCEDURE — 99214 OFFICE O/P EST MOD 30 MIN: CPT | Performed by: INTERNAL MEDICINE

## 2024-06-24 PROCEDURE — G8417 CALC BMI ABV UP PARAM F/U: HCPCS | Performed by: INTERNAL MEDICINE

## 2024-06-24 PROCEDURE — G8427 DOCREV CUR MEDS BY ELIG CLIN: HCPCS | Performed by: INTERNAL MEDICINE

## 2024-06-24 PROCEDURE — 1123F ACP DISCUSS/DSCN MKR DOCD: CPT | Performed by: INTERNAL MEDICINE

## 2024-06-24 PROCEDURE — 1036F TOBACCO NON-USER: CPT | Performed by: INTERNAL MEDICINE

## 2024-06-24 RX ORDER — TRIAMCINOLONE ACETONIDE 5 MG/G
CREAM TOPICAL 2 TIMES DAILY
Qty: 60 G | Refills: 1 | Status: SHIPPED | OUTPATIENT
Start: 2024-06-24

## 2024-06-24 RX ORDER — LISINOPRIL 10 MG/1
10 TABLET ORAL DAILY
Qty: 90 TABLET | Refills: 3 | Status: SHIPPED | OUTPATIENT
Start: 2024-06-24

## 2024-06-24 RX ORDER — GLIMEPIRIDE 1 MG/1
1 TABLET ORAL DAILY
Qty: 90 TABLET | Refills: 3 | Status: SHIPPED | OUTPATIENT
Start: 2024-06-24 | End: 2025-10-21

## 2024-06-24 NOTE — PROGRESS NOTES
06/24/2024   Location:[facility] PHYSICIAN SERVICES  [facility]  [place]  Patient #:  [id number]  YOB: 1935        History of Present Illness     Chief Complaint   Patient presents with    Follow-up     4 month follow-up     Diabetes    Hypertension    Chronic Kidney Disease    COPD       Ms. Mayen is a 88 y.o. female  who presents for Follow up on chronic medical issues. There is compliance and tolerance with medications.    Chronic active medical issues HTN, DM, CKD, COPD, and ovarian cancer on oral chemo.  Keeps regular follow up with oncologist  Keeps regular follow up with nephrologist     Accompanied by son Drew Nielsen   No new complaints.    Diabetes managed with Amaryl  HTN managed with Lisinopril    Last Labs  CBC:   Lab Results   Component Value Date/Time    WBC 7.5 01/31/2024 01:46 PM    RBC 3.99 01/31/2024 01:46 PM    HGB 12.9 01/31/2024 01:46 PM    HCT 41.1 01/31/2024 01:46 PM    .0 01/31/2024 01:46 PM    MCH 32.3 01/31/2024 01:46 PM    MCHC 31.4 01/31/2024 01:46 PM    RDW 12.5 01/31/2024 01:46 PM     01/31/2024 01:46 PM    MPV 9.0 01/31/2024 01:46 PM     CMP:    Lab Results   Component Value Date/Time     06/24/2024 04:17 PM    K 5.0 06/25/2024 03:52 PM     06/24/2024 04:17 PM    CO2 26 06/24/2024 04:17 PM    BUN 28 06/24/2024 04:17 PM    CREATININE 1.30 06/24/2024 04:17 PM    GFRAA 39 09/21/2022 09:43 AM    AGRATIO 0.5 05/18/2022 01:25 PM    LABGLOM 40 06/24/2024 04:17 PM    LABGLOM 34 01/31/2024 01:46 PM    GLUCOSE 82 06/24/2024 04:17 PM    CALCIUM 9.9 06/24/2024 04:17 PM    BILITOT 0.5 01/31/2024 01:46 PM    ALKPHOS 130 01/31/2024 01:46 PM    ALKPHOS 117 05/18/2022 01:25 PM    AST 21 01/31/2024 01:46 PM    ALT 29 01/31/2024 01:46 PM     HgBA1c:    Lab Results   Component Value Date/Time    LABA1C 5.9 06/24/2024 04:17 PM     Microalbumen/Creatinine ratio:  No components found for: \"RUCREAT\"  FLP:    Lab Results   Component Value  yes

## 2024-06-25 ENCOUNTER — TELEPHONE (OUTPATIENT)
Dept: INTERNAL MEDICINE CLINIC | Facility: CLINIC | Age: 89
End: 2024-06-25

## 2024-06-25 ENCOUNTER — NURSE ONLY (OUTPATIENT)
Dept: INTERNAL MEDICINE CLINIC | Facility: CLINIC | Age: 89
End: 2024-06-25

## 2024-06-25 DIAGNOSIS — E87.5 HYPERKALEMIA: ICD-10-CM

## 2024-06-25 LAB — POTASSIUM SERPL-SCNC: 5 MMOL/L (ref 3.5–5.1)

## 2024-06-25 NOTE — TELEPHONE ENCOUNTER
Dr. Chanel,    Noted. This has been addressed.   Spoke with pt's son and scheduled his mom for labs today at 4:00.

## 2024-06-25 NOTE — TELEPHONE ENCOUNTER
----- Message from Rosa Delacruz MD sent at 6/25/2024  8:29 AM EDT -----  Her potassium was very elevated.  I need her to have a stat repeat potassium today.   chronic kidney disease was better.  Her A1c was 5.9. she can stop the glimepride. But she must maintain a diabetic diet.   Rosa Delacruz MD

## 2024-06-25 NOTE — RESULT ENCOUNTER NOTE
Her potassium was very elevated.  I need her to have a stat repeat potassium today.   chronic kidney disease was better.  Her A1c was 5.9. she can stop the glimepride. But she must maintain a diabetic diet.   Rosa Delacruz MD

## 2024-06-25 NOTE — TELEPHONE ENCOUNTER
Pt's son Darwin given results and relayed understanding. He will call back to schedule potassium lab. Has to make sure the pt can make it.

## 2024-06-26 ENCOUNTER — TELEPHONE (OUTPATIENT)
Dept: INTERNAL MEDICINE CLINIC | Facility: CLINIC | Age: 89
End: 2024-06-26

## 2024-06-26 NOTE — TELEPHONE ENCOUNTER
----- Message from Rosa Delacruz MD sent at 6/26/2024  9:54 AM EDT -----  Repeat potassium was okay.   Rosa Delacruz MD

## 2024-07-03 ASSESSMENT — ENCOUNTER SYMPTOMS
WHEEZING: 0
COUGH: 0
BACK PAIN: 1

## 2024-08-29 DIAGNOSIS — C56.9 OVARY CANCER, UNSPECIFIED LATERALITY (HCC): Primary | ICD-10-CM

## 2024-09-04 ENCOUNTER — OFFICE VISIT (OUTPATIENT)
Dept: ONCOLOGY | Age: 89
End: 2024-09-04
Payer: MEDICARE

## 2024-09-04 ENCOUNTER — HOSPITAL ENCOUNTER (OUTPATIENT)
Dept: LAB | Age: 89
Discharge: HOME | End: 2024-09-07
Payer: MEDICARE

## 2024-09-04 VITALS
TEMPERATURE: 97.4 F | HEART RATE: 79 BPM | HEIGHT: 62 IN | OXYGEN SATURATION: 95 % | BODY MASS INDEX: 34.23 KG/M2 | DIASTOLIC BLOOD PRESSURE: 54 MMHG | SYSTOLIC BLOOD PRESSURE: 123 MMHG | WEIGHT: 186 LBS | RESPIRATION RATE: 12 BRPM

## 2024-09-04 DIAGNOSIS — C56.9 OVARY CANCER, UNSPECIFIED LATERALITY (HCC): ICD-10-CM

## 2024-09-04 DIAGNOSIS — R97.1 ELEVATED CANCER ANTIGEN 125 (CA 125): ICD-10-CM

## 2024-09-04 DIAGNOSIS — C56.9 OVARY CANCER, UNSPECIFIED LATERALITY (HCC): Primary | ICD-10-CM

## 2024-09-04 LAB
ALBUMIN SERPL-MCNC: 4 G/DL (ref 3.2–4.6)
ALBUMIN/GLOB SERPL: 1.2 (ref 1–1.9)
ALP SERPL-CCNC: 122 U/L (ref 35–104)
ALT SERPL-CCNC: 18 U/L (ref 12–65)
ANION GAP SERPL CALC-SCNC: 13 MMOL/L (ref 9–18)
AST SERPL-CCNC: 26 U/L (ref 15–37)
BASOPHILS # BLD: 0.1 K/UL (ref 0–0.2)
BASOPHILS NFR BLD: 1 % (ref 0–2)
BILIRUB SERPL-MCNC: 0.5 MG/DL (ref 0–1.2)
BUN SERPL-MCNC: 25 MG/DL (ref 8–23)
CALCIUM SERPL-MCNC: 10 MG/DL (ref 8.8–10.2)
CANCER AG125 SERPL-ACNC: 54 U/ML (ref 0–38)
CHLORIDE SERPL-SCNC: 102 MMOL/L (ref 98–107)
CO2 SERPL-SCNC: 25 MMOL/L (ref 20–28)
CREAT SERPL-MCNC: 1.38 MG/DL (ref 0.6–1.1)
DIFFERENTIAL METHOD BLD: ABNORMAL
EOSINOPHIL # BLD: 0.1 K/UL (ref 0–0.8)
EOSINOPHIL NFR BLD: 1 % (ref 0.5–7.8)
ERYTHROCYTE [DISTWIDTH] IN BLOOD BY AUTOMATED COUNT: 13.3 % (ref 11.9–14.6)
GLOBULIN SER CALC-MCNC: 3.4 G/DL (ref 2.3–3.5)
GLUCOSE SERPL-MCNC: 96 MG/DL (ref 70–99)
HCT VFR BLD AUTO: 45.3 % (ref 35.8–46.3)
HGB BLD-MCNC: 14.2 G/DL (ref 11.7–15.4)
IMM GRANULOCYTES # BLD AUTO: 0.1 K/UL (ref 0–0.5)
IMM GRANULOCYTES NFR BLD AUTO: 1 % (ref 0–5)
LYMPHOCYTES # BLD: 1.8 K/UL (ref 0.5–4.6)
LYMPHOCYTES NFR BLD: 19 % (ref 13–44)
MCH RBC QN AUTO: 30.7 PG (ref 26.1–32.9)
MCHC RBC AUTO-ENTMCNC: 31.3 G/DL (ref 31.4–35)
MCV RBC AUTO: 97.8 FL (ref 82–102)
MONOCYTES # BLD: 0.9 K/UL (ref 0.1–1.3)
MONOCYTES NFR BLD: 9 % (ref 4–12)
NEUTS SEG # BLD: 6.5 K/UL (ref 1.7–8.2)
NEUTS SEG NFR BLD: 69 % (ref 43–78)
NRBC # BLD: 0 K/UL (ref 0–0.2)
PLATELET # BLD AUTO: 253 K/UL (ref 150–450)
PMV BLD AUTO: 9.1 FL (ref 9.4–12.3)
POTASSIUM SERPL-SCNC: 5 MMOL/L (ref 3.5–5.1)
PROT SERPL-MCNC: 7.4 G/DL (ref 6.3–8.2)
RBC # BLD AUTO: 4.63 M/UL (ref 4.05–5.2)
SODIUM SERPL-SCNC: 140 MMOL/L (ref 136–145)
WBC # BLD AUTO: 9.4 K/UL (ref 4.3–11.1)

## 2024-09-04 PROCEDURE — 1090F PRES/ABSN URINE INCON ASSESS: CPT | Performed by: INTERNAL MEDICINE

## 2024-09-04 PROCEDURE — 85025 COMPLETE CBC W/AUTO DIFF WBC: CPT

## 2024-09-04 PROCEDURE — 6360000002 HC RX W HCPCS: Performed by: INTERNAL MEDICINE

## 2024-09-04 PROCEDURE — 99214 OFFICE O/P EST MOD 30 MIN: CPT | Performed by: INTERNAL MEDICINE

## 2024-09-04 PROCEDURE — G8417 CALC BMI ABV UP PARAM F/U: HCPCS | Performed by: INTERNAL MEDICINE

## 2024-09-04 PROCEDURE — 36591 DRAW BLOOD OFF VENOUS DEVICE: CPT

## 2024-09-04 PROCEDURE — 2580000003 HC RX 258: Performed by: INTERNAL MEDICINE

## 2024-09-04 PROCEDURE — 1036F TOBACCO NON-USER: CPT | Performed by: INTERNAL MEDICINE

## 2024-09-04 PROCEDURE — 80053 COMPREHEN METABOLIC PANEL: CPT

## 2024-09-04 PROCEDURE — 1123F ACP DISCUSS/DSCN MKR DOCD: CPT | Performed by: INTERNAL MEDICINE

## 2024-09-04 PROCEDURE — 86304 IMMUNOASSAY TUMOR CA 125: CPT

## 2024-09-04 PROCEDURE — G8428 CUR MEDS NOT DOCUMENT: HCPCS | Performed by: INTERNAL MEDICINE

## 2024-09-04 RX ORDER — HEPARIN 100 UNIT/ML
300 SYRINGE INTRAVENOUS PRN
Status: DISCONTINUED | OUTPATIENT
Start: 2024-09-04 | End: 2024-09-08 | Stop reason: HOSPADM

## 2024-09-04 RX ORDER — SODIUM CHLORIDE 0.9 % (FLUSH) 0.9 %
5-40 SYRINGE (ML) INJECTION PRN
Status: DISCONTINUED | OUTPATIENT
Start: 2024-09-04 | End: 2024-09-08 | Stop reason: HOSPADM

## 2024-09-04 RX ADMIN — HEPARIN 300 UNITS: 100 SYRINGE at 13:15

## 2024-09-04 RX ADMIN — SODIUM CHLORIDE, PRESERVATIVE FREE 20 ML: 5 INJECTION INTRAVENOUS at 13:14

## 2024-09-04 ASSESSMENT — PATIENT HEALTH QUESTIONNAIRE - PHQ9
SUM OF ALL RESPONSES TO PHQ QUESTIONS 1-9: 0
SUM OF ALL RESPONSES TO PHQ QUESTIONS 1-9: 0
1. LITTLE INTEREST OR PLEASURE IN DOING THINGS: NOT AT ALL
SUM OF ALL RESPONSES TO PHQ9 QUESTIONS 1 & 2: 0
SUM OF ALL RESPONSES TO PHQ QUESTIONS 1-9: 0
SUM OF ALL RESPONSES TO PHQ QUESTIONS 1-9: 0
2. FEELING DOWN, DEPRESSED OR HOPELESS: NOT AT ALL

## 2024-09-04 NOTE — PROGRESS NOTES
Patient to port lab for port access and lab draw. Port accessed using 20g 0.75\" griffiths needle without difficulty. No blood return noted, flushed without difficulty. Labs drawn peripherally. Port de-accessed, dressing applied. Patient tolerated well. Discharged ambulatory.

## 2024-09-04 NOTE — PROGRESS NOTES
deficits.   MSK Normal range of motion in general.  No edema and no tenderness.   Psych Appropriate mood and affect.      Labs:  Recent Results (from the past 96 hour(s))   Comprehensive Metabolic Panel    Collection Time: 09/04/24  1:19 PM   Result Value Ref Range    Sodium 140 136 - 145 mmol/L    Potassium 5.0 3.5 - 5.1 mmol/L    Chloride 102 98 - 107 mmol/L    CO2 25 20 - 28 mmol/L    Anion Gap 13 9 - 18 mmol/L    Glucose 96 70 - 99 mg/dL    BUN 25 (H) 8 - 23 MG/DL    Creatinine 1.38 (H) 0.60 - 1.10 MG/DL    Est, Glom Filt Rate 37 (L) >60 ml/min/1.73m2    Calcium 10.0 8.8 - 10.2 MG/DL    Total Bilirubin 0.5 0.0 - 1.2 MG/DL    ALT 18 12 - 65 U/L    AST 26 15 - 37 U/L    Alk Phosphatase 122 (H) 35 - 104 U/L    Total Protein 7.4 6.3 - 8.2 g/dL    Albumin 4.0 3.2 - 4.6 g/dL    Globulin 3.4 2.3 - 3.5 g/dL    Albumin/Globulin Ratio 1.2 1.0 - 1.9     CBC with Auto Differential    Collection Time: 09/04/24  1:19 PM   Result Value Ref Range    WBC 9.4 4.3 - 11.1 K/uL    RBC 4.63 4.05 - 5.2 M/uL    Hemoglobin 14.2 11.7 - 15.4 g/dL    Hematocrit 45.3 35.8 - 46.3 %    MCV 97.8 82.0 - 102.0 FL    MCH 30.7 26.1 - 32.9 PG    MCHC 31.3 (L) 31.4 - 35.0 g/dL    RDW 13.3 11.9 - 14.6 %    Platelets 253 150 - 450 K/uL    MPV 9.1 (L) 9.4 - 12.3 FL    nRBC 0.00 0.0 - 0.2 K/uL    Neutrophils % 69 43 - 78 %    Lymphocytes % 19 13 - 44 %    Monocytes % 9 4.0 - 12.0 %    Eosinophils % 1 0.5 - 7.8 %    Basophils % 1 0.0 - 2.0 %    Immature Granulocytes % 1 0.0 - 5.0 %    Neutrophils Absolute 6.5 1.7 - 8.2 K/UL    Lymphocytes Absolute 1.8 0.5 - 4.6 K/UL    Monocytes Absolute 0.9 0.1 - 1.3 K/UL    Eosinophils Absolute 0.1 0.0 - 0.8 K/UL    Basophils Absolute 0.1 0.0 - 0.2 K/UL    Immature Granulocytes Absolute 0.1 0.0 - 0.5 K/UL    Differential Type AUTOMATED         Collection Time: 09/04/24  1:19 PM   Result Value Ref Range     54 (H) 0 - 38 U/mL       Imaging:  No results found.      ASSESSMENT:   Diagnosis Orders   1. Ovary

## 2024-09-04 NOTE — PATIENT INSTRUCTIONS
Patient Information from Today's Visit    The members of your Oncology Medical Home are listed below:    Physician Provider: Robert Villalba, Medical Oncologist  Advanced Practice Clinician: Debo Bland NP  Registered Nurse: Elías NGUYỄN RN  Nurse Navigator: N/A  Medical Assistant: Diamond ALEXANDER CMA  :Светлана ERNANDEZ  Supportive Care Services: Salome ANDERSON LMSW    Diagnosis: Ovarian Cancer    Follow Up Instructions: 2-3 months      Treatment Summary has been discussed and given to patient:No      Current Labs:   Hospital Outpatient Visit on 09/04/2024   Component Date Value Ref Range Status    Sodium 09/04/2024 140  136 - 145 mmol/L Final    Potassium 09/04/2024 5.0  3.5 - 5.1 mmol/L Final    Chloride 09/04/2024 102  98 - 107 mmol/L Final    CO2 09/04/2024 25  20 - 28 mmol/L Final    Anion Gap 09/04/2024 13  9 - 18 mmol/L Final    Glucose 09/04/2024 96  70 - 99 mg/dL Final    Comment: <70 mg/dL Consistent with, but not fully diagnostic of hypoglycemia.  100 - 125 mg/dL Impaired fasting glucose/consistent with pre-diabetes mellitus.  > 126 mg/dl Fasting glucose consistent with overt diabetes mellitus      BUN 09/04/2024 25 (H)  8 - 23 MG/DL Final    Creatinine 09/04/2024 1.38 (H)  0.60 - 1.10 MG/DL Final    Est, Glom Filt Rate 09/04/2024 37 (L)  >60 ml/min/1.73m2 Final    Comment:    Pediatric calculator link: https://www.kidney.org/professionals/kdoqi/gfr_calculatorped     These results are not intended for use in patients <18 years of age.     eGFR results are calculated without a race factor using  the 2021 CKD-EPI equation. Careful clinical correlation is recommended, particularly when comparing to results calculated using previous equations.  The CKD-EPI equation is less accurate in patients with extremes of muscle mass, extra-renal metabolism of creatinine, excessive creatine ingestion, or following therapy that affects renal tubular secretion.      Calcium 09/04/2024 10.0  8.8 - 10.2 MG/DL Final    Total

## 2024-10-25 SDOH — HEALTH STABILITY: PHYSICAL HEALTH: ON AVERAGE, HOW MANY DAYS PER WEEK DO YOU ENGAGE IN MODERATE TO STRENUOUS EXERCISE (LIKE A BRISK WALK)?: 5 DAYS

## 2024-10-25 SDOH — ECONOMIC STABILITY: FOOD INSECURITY: WITHIN THE PAST 12 MONTHS, YOU WORRIED THAT YOUR FOOD WOULD RUN OUT BEFORE YOU GOT MONEY TO BUY MORE.: NEVER TRUE

## 2024-10-25 SDOH — HEALTH STABILITY: PHYSICAL HEALTH: ON AVERAGE, HOW MANY MINUTES DO YOU ENGAGE IN EXERCISE AT THIS LEVEL?: 20 MIN

## 2024-10-25 SDOH — ECONOMIC STABILITY: FOOD INSECURITY: WITHIN THE PAST 12 MONTHS, THE FOOD YOU BOUGHT JUST DIDN'T LAST AND YOU DIDN'T HAVE MONEY TO GET MORE.: NEVER TRUE

## 2024-10-25 SDOH — ECONOMIC STABILITY: INCOME INSECURITY: HOW HARD IS IT FOR YOU TO PAY FOR THE VERY BASICS LIKE FOOD, HOUSING, MEDICAL CARE, AND HEATING?: NOT HARD AT ALL

## 2024-10-25 ASSESSMENT — PATIENT HEALTH QUESTIONNAIRE - PHQ9
1. LITTLE INTEREST OR PLEASURE IN DOING THINGS: NOT AT ALL
2. FEELING DOWN, DEPRESSED OR HOPELESS: NOT AT ALL
SUM OF ALL RESPONSES TO PHQ QUESTIONS 1-9: 0
SUM OF ALL RESPONSES TO PHQ QUESTIONS 1-9: 0
SUM OF ALL RESPONSES TO PHQ9 QUESTIONS 1 & 2: 0
SUM OF ALL RESPONSES TO PHQ QUESTIONS 1-9: 0
SUM OF ALL RESPONSES TO PHQ QUESTIONS 1-9: 0

## 2024-10-25 ASSESSMENT — LIFESTYLE VARIABLES
HOW MANY STANDARD DRINKS CONTAINING ALCOHOL DO YOU HAVE ON A TYPICAL DAY: 0
HOW OFTEN DO YOU HAVE SIX OR MORE DRINKS ON ONE OCCASION: 1
HOW MANY STANDARD DRINKS CONTAINING ALCOHOL DO YOU HAVE ON A TYPICAL DAY: PATIENT DOES NOT DRINK

## 2024-10-28 ENCOUNTER — OFFICE VISIT (OUTPATIENT)
Dept: INTERNAL MEDICINE CLINIC | Facility: CLINIC | Age: 89
End: 2024-10-28

## 2024-10-28 VITALS
HEART RATE: 75 BPM | TEMPERATURE: 97.1 F | DIASTOLIC BLOOD PRESSURE: 64 MMHG | SYSTOLIC BLOOD PRESSURE: 134 MMHG | OXYGEN SATURATION: 96 % | BODY MASS INDEX: 33.49 KG/M2 | HEIGHT: 63 IN | WEIGHT: 189 LBS

## 2024-10-28 DIAGNOSIS — J44.9 CHRONIC OBSTRUCTIVE PULMONARY DISEASE, UNSPECIFIED COPD TYPE (HCC): ICD-10-CM

## 2024-10-28 DIAGNOSIS — I10 PRIMARY HYPERTENSION: ICD-10-CM

## 2024-10-28 DIAGNOSIS — N18.32 TYPE 2 DIABETES MELLITUS WITH STAGE 3B CHRONIC KIDNEY DISEASE, WITHOUT LONG-TERM CURRENT USE OF INSULIN (HCC): ICD-10-CM

## 2024-10-28 DIAGNOSIS — E11.22 TYPE 2 DIABETES MELLITUS WITH STAGE 3B CHRONIC KIDNEY DISEASE, WITHOUT LONG-TERM CURRENT USE OF INSULIN (HCC): ICD-10-CM

## 2024-10-28 DIAGNOSIS — N18.30 STAGE 3 CHRONIC KIDNEY DISEASE, UNSPECIFIED WHETHER STAGE 3A OR 3B CKD (HCC): ICD-10-CM

## 2024-10-28 DIAGNOSIS — Z00.00 MEDICARE ANNUAL WELLNESS VISIT, SUBSEQUENT: Primary | ICD-10-CM

## 2024-10-28 LAB
EST. AVERAGE GLUCOSE BLD GHB EST-MCNC: 145 MG/DL
HBA1C MFR BLD: 6.7 % (ref 0–5.6)

## 2024-10-28 RX ORDER — INDOMETHACIN 50 MG/1
50 CAPSULE ORAL DAILY PRN
Qty: 20 CAPSULE | Refills: 0 | Status: SHIPPED | OUTPATIENT
Start: 2024-10-28

## 2024-10-28 ASSESSMENT — LIFESTYLE VARIABLES
HOW MANY STANDARD DRINKS CONTAINING ALCOHOL DO YOU HAVE ON A TYPICAL DAY: PATIENT DOES NOT DRINK
HOW OFTEN DO YOU HAVE A DRINK CONTAINING ALCOHOL: NEVER

## 2024-10-28 NOTE — PATIENT INSTRUCTIONS
lean proteins. Heart-healthy proteins include seafood, lean meats and poultry, eggs, beans, peas, nuts, seeds, and soy products.     Limit drinks and foods with added sugar. These include candy, desserts, and soda pop.   Heart-healthy lifestyle    If your doctor recommends it, get more exercise. For many people, walking is a good choice. Or you may want to swim, bike, or do other activities. Bit by bit, increase the time you're active every day. Try for at least 30 minutes on most days of the week.     Try to quit or cut back on using tobacco and other nicotine products. This includes smoking and vaping. If you need help quitting, talk to your doctor about stop-smoking programs and medicines. These can increase your chances of quitting for good. Quitting is one of the most important things you can do to protect your heart. It is never too late to quit. Try to avoid secondhand smoke too.     Stay at a weight that's healthy for you. Talk to your doctor if you need help losing weight.     Try to get 7 to 9 hours of sleep each night.     Limit alcohol to 2 drinks a day for men and 1 drink a day for women. Too much alcohol can cause health problems.     Manage other health problems such as diabetes, high blood pressure, and high cholesterol. If you think you may have a problem with alcohol or drug use, talk to your doctor.   Medicines    Take your medicines exactly as prescribed. Call your doctor if you think you are having a problem with your medicine.     If your doctor recommends aspirin, take the amount directed each day. Make sure you take aspirin and not another kind of pain reliever, such as acetaminophen (Tylenol).   When should you call for help?   Call 911 if you have symptoms of a heart attack. These may include:    Chest pain or pressure, or a strange feeling in the chest.     Sweating.     Shortness of breath.     Pain, pressure, or a strange feeling in the back, neck, jaw, or upper belly or in one or both

## 2024-10-28 NOTE — PROGRESS NOTES
10/28/2024   Location:[facility] PHYSICIAN SERVICES  [facility] [place] MEDICINE  [place]  Patient #:  [id number]  YOB: 1935        History of Present Illness     Chief Complaint   Patient presents with    Medicare AWV     Subsequent     Follow-up     4 month follow-up        Ms. Mayen is a 89 y.o. female  who presents for This is a combined medical follow up office visit and a Medicare Wellness Visit.  The Wellness note has been reviewed.   Follow up on chronic medical issues. There is compliance and tolerance with medications.    Chronic active medical issues HTN, DM, CKD, COPD, and ovarian cancer on oral chemo.  Keeps regular follow up with oncologist  Keeps regular follow up with nephrologist      Accompanied by son Drew Nielsen   No new complaints.     Diabetes managed with Amaryl  HTN managed with Lisinopril     Keeps regular follow up with oncologist  Keeps regular follow up with nephrologist   Keeps regular follow with pulmonologist    Admits to eating shell fish causing gout flare  Last Labs  CBC:   Lab Results   Component Value Date/Time    WBC 9.4 09/04/2024 01:19 PM    RBC 4.63 09/04/2024 01:19 PM    HGB 14.2 09/04/2024 01:19 PM    HCT 45.3 09/04/2024 01:19 PM    MCV 97.8 09/04/2024 01:19 PM    MCH 30.7 09/04/2024 01:19 PM    MCHC 31.3 09/04/2024 01:19 PM    RDW 13.3 09/04/2024 01:19 PM     09/04/2024 01:19 PM    MPV 9.1 09/04/2024 01:19 PM     CMP:    Lab Results   Component Value Date/Time     09/04/2024 01:19 PM    K 5.0 09/04/2024 01:19 PM     09/04/2024 01:19 PM    CO2 25 09/04/2024 01:19 PM    BUN 25 09/04/2024 01:19 PM    CREATININE 1.38 09/04/2024 01:19 PM    GFRAA 39 09/21/2022 09:43 AM    AGRATIO 0.5 05/18/2022 01:25 PM    LABGLOM 37 09/04/2024 01:19 PM    LABGLOM 34 01/31/2024 01:46 PM    GLUCOSE 96 09/04/2024 01:19 PM    CALCIUM 10.0 09/04/2024 01:19 PM    BILITOT 0.5 09/04/2024 01:19 PM    ALKPHOS 122 09/04/2024 01:19 PM    ALKPHOS 117 05/18/2022 01:25 
Comments)     GI issues    Severe stomach cramps   GI issues   GI issues     Prior to Visit Medications    Medication Sig Taking? Authorizing Provider   triamcinolone (ARISTOCORT) 0.5 % cream Apply topically 2 times daily Apply topically 3 times daily. Yes Rosa Delacruz MD   Cholecalciferol (VITAMIN D3) 50 MCG (2000 UT) CAPS Take by mouth Yes Mahi Durham MD   Omega-3 Fatty Acids (FISH OIL) 1000 MG CPDR Take 3 capsules by mouth Yes Mahi Durham MD   Loratadine (CLARITIN PO) Take by mouth Yes Mahi Durham MD   Multiple Minerals-Vitamins (CALCIUM-MAGNESIUM-ZINC-D3 PO) Take by mouth daily Yes Mahi Durham MD   Magnesium Hydroxide (DULCOLAX PO) Take by mouth as needed Yes Mahi Durham MD   acetaminophen (TYLENOL) 500 MG tablet Take 1 tablet by mouth every 6 hours as needed for Pain Yes Mahi Durham MD   OXYGEN 2.5 liters at night Yes Mahi Durham MD   vitamin C (ASCORBIC ACID) 500 MG tablet Take 1 tablet by mouth daily Yes Mahi Durham MD   Multiple Vitamin (MULTIVITAMIN ADULT PO) Take by mouth Daily Yes Mahi Durham MD   albuterol sulfate HFA (PROVENTIL;VENTOLIN;PROAIR) 108 (90 Base) MCG/ACT inhaler INHALE 2 PUFFS BY MOUTH EVERY 6 HOURS AS NEEDED Yes Automatic Reconciliation, Ar   cyanocobalamin 500 MCG tablet Take 5 tablets by mouth daily Yes Automatic Reconciliation, Ar   umeclidinium-vilanterol (ANORO ELLIPTA) 62.5-25 MCG/INH AEPB inhaler INHALE 1 PUFF BY MOUTH EVERY DAY Yes Automatic Reconciliation, Ar   glimepiride (AMARYL) 1 MG tablet Take 1 tablet by mouth daily  Patient not taking: Reported on 9/4/2024  Rosa Delacruz MD   lisinopril (PRINIVIL;ZESTRIL) 10 MG tablet Take 1 tablet by mouth daily  Patient not taking: Reported on 9/4/2024  Rosa Delacruz MD   vitamin D (ERGOCALCIFEROL) 1.25 MG (41825 UT) CAPS capsule Take 1 capsule by mouth once a week  Patient not taking: Reported on 10/28/2024  Rosa Delacruz MD

## 2024-11-04 NOTE — RESULT ENCOUNTER NOTE
Diabtes control has worsened some.   Avoid sweet/sugary foods and beverages. Limit carbohydrates  in your diet. Carbohydrates like bread, pasta, rice and white potatoes are broken down by the body into glucose which is sugar. Sugar is a source of energy. So the more active you are the more sugar is burned off. Aim for 150 minutes of aerobic exercise over the course of a week. Walking is great exercise.    A1c has increased to 637. The goal is to keep less than 7

## 2024-11-20 ENCOUNTER — OFFICE VISIT (OUTPATIENT)
Dept: ONCOLOGY | Age: 89
End: 2024-11-20
Payer: MEDICARE

## 2024-11-20 ENCOUNTER — HOSPITAL ENCOUNTER (OUTPATIENT)
Dept: LAB | Age: 89
Discharge: HOME | End: 2024-11-20
Payer: MEDICARE

## 2024-11-20 VITALS
HEART RATE: 67 BPM | HEIGHT: 62 IN | DIASTOLIC BLOOD PRESSURE: 68 MMHG | OXYGEN SATURATION: 94 % | BODY MASS INDEX: 35.51 KG/M2 | TEMPERATURE: 98.3 F | RESPIRATION RATE: 16 BRPM | SYSTOLIC BLOOD PRESSURE: 170 MMHG | WEIGHT: 193 LBS

## 2024-11-20 DIAGNOSIS — R97.1 ELEVATED CANCER ANTIGEN 125 (CA 125): ICD-10-CM

## 2024-11-20 DIAGNOSIS — C54.9 MALIGNANT NEOPLASM OF CORPUS UTERI, UNSPECIFIED (HCC): ICD-10-CM

## 2024-11-20 DIAGNOSIS — C54.9 MIXED MULLERIAN TUMOR (HCC): ICD-10-CM

## 2024-11-20 DIAGNOSIS — C56.9 OVARY CANCER, UNSPECIFIED LATERALITY (HCC): Primary | ICD-10-CM

## 2024-11-20 DIAGNOSIS — C56.9 OVARY CANCER, UNSPECIFIED LATERALITY (HCC): ICD-10-CM

## 2024-11-20 LAB
ALBUMIN SERPL-MCNC: 3.6 G/DL (ref 3.2–4.6)
ALBUMIN/GLOB SERPL: 0.9 (ref 1–1.9)
ALP SERPL-CCNC: 117 U/L (ref 35–104)
ALT SERPL-CCNC: 14 U/L (ref 8–45)
ANION GAP SERPL CALC-SCNC: 10 MMOL/L (ref 7–16)
AST SERPL-CCNC: 25 U/L (ref 15–37)
BASOPHILS # BLD: 0.1 K/UL (ref 0–0.2)
BASOPHILS NFR BLD: 1 % (ref 0–2)
BILIRUB SERPL-MCNC: 0.5 MG/DL (ref 0–1.2)
BUN SERPL-MCNC: 21 MG/DL (ref 8–23)
CALCIUM SERPL-MCNC: 9.5 MG/DL (ref 8.8–10.2)
CANCER AG125 SERPL-ACNC: 303 U/ML (ref 0–38)
CHLORIDE SERPL-SCNC: 101 MMOL/L (ref 98–107)
CO2 SERPL-SCNC: 26 MMOL/L (ref 20–29)
CREAT SERPL-MCNC: 1.13 MG/DL (ref 0.6–1.1)
DIFFERENTIAL METHOD BLD: ABNORMAL
EOSINOPHIL # BLD: 0.2 K/UL (ref 0–0.8)
EOSINOPHIL NFR BLD: 2 % (ref 0.5–7.8)
ERYTHROCYTE [DISTWIDTH] IN BLOOD BY AUTOMATED COUNT: 13.5 % (ref 11.9–14.6)
GLOBULIN SER CALC-MCNC: 3.9 G/DL (ref 2.3–3.5)
GLUCOSE SERPL-MCNC: 159 MG/DL (ref 70–99)
HCT VFR BLD AUTO: 43.4 % (ref 35.8–46.3)
HGB BLD-MCNC: 13.7 G/DL (ref 11.7–15.4)
IMM GRANULOCYTES # BLD AUTO: 0.2 K/UL (ref 0–0.5)
IMM GRANULOCYTES NFR BLD AUTO: 2 % (ref 0–5)
LYMPHOCYTES # BLD: 2.4 K/UL (ref 0.5–4.6)
LYMPHOCYTES NFR BLD: 27 % (ref 13–44)
MCH RBC QN AUTO: 30.2 PG (ref 26.1–32.9)
MCHC RBC AUTO-ENTMCNC: 31.6 G/DL (ref 31.4–35)
MCV RBC AUTO: 95.8 FL (ref 82–102)
MONOCYTES # BLD: 0.8 K/UL (ref 0.1–1.3)
MONOCYTES NFR BLD: 9 % (ref 4–12)
NEUTS SEG # BLD: 5.3 K/UL (ref 1.7–8.2)
NEUTS SEG NFR BLD: 59 % (ref 43–78)
NRBC # BLD: 0 K/UL (ref 0–0.2)
PLATELET # BLD AUTO: 224 K/UL (ref 150–450)
PMV BLD AUTO: 9.2 FL (ref 9.4–12.3)
POTASSIUM SERPL-SCNC: 4.7 MMOL/L (ref 3.5–5.1)
PROT SERPL-MCNC: 7.5 G/DL (ref 6.3–8.2)
RBC # BLD AUTO: 4.53 M/UL (ref 4.05–5.2)
SODIUM SERPL-SCNC: 137 MMOL/L (ref 136–145)
WBC # BLD AUTO: 8.9 K/UL (ref 4.3–11.1)

## 2024-11-20 PROCEDURE — G8417 CALC BMI ABV UP PARAM F/U: HCPCS | Performed by: INTERNAL MEDICINE

## 2024-11-20 PROCEDURE — 1036F TOBACCO NON-USER: CPT | Performed by: INTERNAL MEDICINE

## 2024-11-20 PROCEDURE — G8482 FLU IMMUNIZE ORDER/ADMIN: HCPCS | Performed by: INTERNAL MEDICINE

## 2024-11-20 PROCEDURE — 85025 COMPLETE CBC W/AUTO DIFF WBC: CPT

## 2024-11-20 PROCEDURE — G8428 CUR MEDS NOT DOCUMENT: HCPCS | Performed by: INTERNAL MEDICINE

## 2024-11-20 PROCEDURE — 80053 COMPREHEN METABOLIC PANEL: CPT

## 2024-11-20 PROCEDURE — 1123F ACP DISCUSS/DSCN MKR DOCD: CPT | Performed by: INTERNAL MEDICINE

## 2024-11-20 PROCEDURE — 6360000002 HC RX W HCPCS: Performed by: INTERNAL MEDICINE

## 2024-11-20 PROCEDURE — 36591 DRAW BLOOD OFF VENOUS DEVICE: CPT

## 2024-11-20 PROCEDURE — 1126F AMNT PAIN NOTED NONE PRSNT: CPT | Performed by: INTERNAL MEDICINE

## 2024-11-20 PROCEDURE — 2580000003 HC RX 258: Performed by: INTERNAL MEDICINE

## 2024-11-20 PROCEDURE — 86304 IMMUNOASSAY TUMOR CA 125: CPT

## 2024-11-20 PROCEDURE — 1090F PRES/ABSN URINE INCON ASSESS: CPT | Performed by: INTERNAL MEDICINE

## 2024-11-20 PROCEDURE — 99214 OFFICE O/P EST MOD 30 MIN: CPT | Performed by: INTERNAL MEDICINE

## 2024-11-20 RX ORDER — HEPARIN 100 UNIT/ML
300 SYRINGE INTRAVENOUS PRN
Status: DISCONTINUED | OUTPATIENT
Start: 2024-11-20 | End: 2024-11-21 | Stop reason: HOSPADM

## 2024-11-20 RX ORDER — SODIUM CHLORIDE 0.9 % (FLUSH) 0.9 %
5-40 SYRINGE (ML) INJECTION PRN
Status: DISCONTINUED | OUTPATIENT
Start: 2024-11-20 | End: 2024-11-21 | Stop reason: HOSPADM

## 2024-11-20 RX ADMIN — HEPARIN 300 UNITS: 100 SYRINGE at 13:49

## 2024-11-20 RX ADMIN — SODIUM CHLORIDE, PRESERVATIVE FREE 10 ML: 5 INJECTION INTRAVENOUS at 13:46

## 2024-11-20 ASSESSMENT — PATIENT HEALTH QUESTIONNAIRE - PHQ9
SUM OF ALL RESPONSES TO PHQ QUESTIONS 1-9: 0
2. FEELING DOWN, DEPRESSED OR HOPELESS: NOT AT ALL
SUM OF ALL RESPONSES TO PHQ QUESTIONS 1-9: 0
1. LITTLE INTEREST OR PLEASURE IN DOING THINGS: NOT AT ALL
SUM OF ALL RESPONSES TO PHQ9 QUESTIONS 1 & 2: 0

## 2024-11-20 NOTE — PROGRESS NOTES
[facility] Hematology and Oncology: Office Visit Established Patient    Chief Complaint:    Chief Complaint   Patient presents with    Follow-up         History of Present Illness:  Ms. Mayen is a 89 y.o. female who returns today for management of ovarian cancer.  She had a PET/CT in March 2022 after CT abdomen was done for abdominal discomfort and blood per rectum and showed an abdominal mass.  PET scan revealed hypermetabolic multiple abdominal masses as well as a right cardiophrenic angle mass concerning for malignancy.  A hypermetabolic 4.1 cm fluid and gas containing collection located at rectosigmoid junction and uterine fundus concerning for possible abscess also noted.  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  Her CA-125 was highly elevated.  She saw Dr. Sidhu and he treated her with palliative carbo/Taxol, she did not complete the Taxol due to performance status decline.  She had a good response to therapy and was started on maintenance PARPi (nonBRCA mutated), but stopped in November 2023 due to increasing fatigue.  Most recently, her imaging in May 2024 was negative for metastatic disease, CR - she has been observed with plan for repeat imaging PRN and ongoing CA-125 monitoring.  August 2024 her CA-125 was increased to 25 from 54.  This may be related to the recent analyzer change but the jump is significant enough and the timing is coincident enough with the time off therapy that I would be concerned about early progression.  We discussed repeat imaging, she wishes to wait another 2-3 months to recheck the Ca-125 and then repeat imaging if it continues to climb.    History of Present Illness  The patient is an 89-year-old female who is here for a follow-up of ovarian cancer and rising CA-125. She is accompanied by her son.    She reports feeling generally well. She has not experienced any abdominal pain and maintains a good appetite. She does not experience

## 2024-11-20 NOTE — PATIENT INSTRUCTIONS
Patient Information from Today's Visit    The members of your Oncology Medical Home are listed below:    Physician Provider: Robert Villalba, Medical Oncologist  Advanced Practice Clinician: Debo Bland NP  Registered Nurse: Elías NGUYỄN RN  Nurse Navigator: N/A  Medical Assistant: Diamond ALEXANDER CMA  :Светлана ERNANDEZ  Supportive Care Services: Salome ANDERSON LMSW    Diagnosis: Ovarian Cancer    Follow Up Instructions: for CT scan and then to see Dr. Villalba to review      Treatment Summary has been discussed and given to patient:No      Current Labs:   Hospital Outpatient Visit on 11/20/2024   Component Date Value Ref Range Status     11/20/2024 303 (H)  0 - 38 U/mL Final    Comment: Roche ECLIA methodology  Patient's results of tumor marker testing may not be comparable to labs using different manufacturers/methods.      Sodium 11/20/2024 137  136 - 145 mmol/L Final    Potassium 11/20/2024 4.7  3.5 - 5.1 mmol/L Final    Chloride 11/20/2024 101  98 - 107 mmol/L Final    CO2 11/20/2024 26  20 - 29 mmol/L Final    Anion Gap 11/20/2024 10  7 - 16 mmol/L Final    Glucose 11/20/2024 159 (H)  70 - 99 mg/dL Final    Comment: <70 mg/dL Consistent with, but not fully diagnostic of hypoglycemia.  100 - 125 mg/dL Impaired fasting glucose/consistent with pre-diabetes mellitus.  > 126 mg/dl Fasting glucose consistent with overt diabetes mellitus      BUN 11/20/2024 21  8 - 23 MG/DL Final    Creatinine 11/20/2024 1.13 (H)  0.60 - 1.10 MG/DL Final    Est, Glom Filt Rate 11/20/2024 47 (L)  >60 ml/min/1.73m2 Final    Comment:    Pediatric calculator link: https://www.kidney.org/professionals/kdoqi/gfr_calculatorped     These results are not intended for use in patients <18 years of age.     eGFR results are calculated without a race factor using  the 2021 CKD-EPI equation. Careful clinical correlation is recommended, particularly when comparing to results calculated using previous equations.  The CKD-EPI equation is less accurate in

## 2024-11-20 NOTE — PROGRESS NOTES
Patient to port lab for port access and lab draw. Port accessed using 20g 0.75\" griffiths needle without difficulty. Labs drawn from port and port flushed with NS, heparin locked. Port de-accessed, gauze applied. Patient tolerated well. Discharged via wheelchair.

## 2024-11-26 ENCOUNTER — HOSPITAL ENCOUNTER (OUTPATIENT)
Dept: CT IMAGING | Age: 89
Discharge: HOME | End: 2024-11-29
Attending: INTERNAL MEDICINE
Payer: MEDICARE

## 2024-11-26 DIAGNOSIS — R97.1 ELEVATED CANCER ANTIGEN 125 (CA 125): ICD-10-CM

## 2024-11-26 DIAGNOSIS — C56.9 OVARY CANCER, UNSPECIFIED LATERALITY (HCC): ICD-10-CM

## 2024-11-26 DIAGNOSIS — C54.9 MIXED MULLERIAN TUMOR (HCC): ICD-10-CM

## 2024-11-26 DIAGNOSIS — C54.9 MALIGNANT NEOPLASM OF CORPUS UTERI, UNSPECIFIED (HCC): ICD-10-CM

## 2024-11-26 PROCEDURE — 74177 CT ABD & PELVIS W/CONTRAST: CPT

## 2024-11-26 PROCEDURE — 6360000004 HC RX CONTRAST MEDICATION: Performed by: INTERNAL MEDICINE

## 2024-11-26 RX ORDER — DIATRIZOATE MEGLUMINE AND DIATRIZOATE SODIUM 660; 100 MG/ML; MG/ML
15 SOLUTION ORAL; RECTAL
Status: DISCONTINUED | OUTPATIENT
Start: 2024-11-26 | End: 2024-11-30 | Stop reason: HOSPADM

## 2024-11-26 RX ORDER — IOPAMIDOL 755 MG/ML
100 INJECTION, SOLUTION INTRAVASCULAR
Status: COMPLETED | OUTPATIENT
Start: 2024-11-26 | End: 2024-11-26

## 2024-11-26 RX ADMIN — DIATRIZOATE MEGLUMINE AND DIATRIZOATE SODIUM 15 ML: 660; 100 LIQUID ORAL; RECTAL at 14:17

## 2024-11-26 RX ADMIN — IOPAMIDOL 100 ML: 755 INJECTION, SOLUTION INTRAVENOUS at 14:17

## 2024-12-02 ENCOUNTER — OFFICE VISIT (OUTPATIENT)
Dept: ONCOLOGY | Age: 89
End: 2024-12-02
Payer: MEDICARE

## 2024-12-02 VITALS
SYSTOLIC BLOOD PRESSURE: 160 MMHG | DIASTOLIC BLOOD PRESSURE: 68 MMHG | RESPIRATION RATE: 14 BRPM | BODY MASS INDEX: 35.17 KG/M2 | HEART RATE: 70 BPM | WEIGHT: 191.1 LBS | OXYGEN SATURATION: 95 % | TEMPERATURE: 98 F | HEIGHT: 62 IN

## 2024-12-02 DIAGNOSIS — C56.9 OVARY CANCER, UNSPECIFIED LATERALITY (HCC): Primary | ICD-10-CM

## 2024-12-02 PROCEDURE — 1123F ACP DISCUSS/DSCN MKR DOCD: CPT | Performed by: INTERNAL MEDICINE

## 2024-12-02 PROCEDURE — G8482 FLU IMMUNIZE ORDER/ADMIN: HCPCS | Performed by: INTERNAL MEDICINE

## 2024-12-02 PROCEDURE — 1036F TOBACCO NON-USER: CPT | Performed by: INTERNAL MEDICINE

## 2024-12-02 PROCEDURE — G8428 CUR MEDS NOT DOCUMENT: HCPCS | Performed by: INTERNAL MEDICINE

## 2024-12-02 PROCEDURE — G8417 CALC BMI ABV UP PARAM F/U: HCPCS | Performed by: INTERNAL MEDICINE

## 2024-12-02 PROCEDURE — 99214 OFFICE O/P EST MOD 30 MIN: CPT | Performed by: INTERNAL MEDICINE

## 2024-12-02 PROCEDURE — 1126F AMNT PAIN NOTED NONE PRSNT: CPT | Performed by: INTERNAL MEDICINE

## 2024-12-02 PROCEDURE — 1090F PRES/ABSN URINE INCON ASSESS: CPT | Performed by: INTERNAL MEDICINE

## 2024-12-02 NOTE — PATIENT INSTRUCTIONS
Patient Information from Today's Visit    The members of your Oncology Medical Home are listed below:    Physician Provider: Robert Villalba Medical Oncologist  Advanced Practice Clinician: Debo Bland NP  Registered Nurse: Elías NGUYỄN RN  Navigator: N/A  Medical Assistant: Diamond ALEXANDER MA  : Светлана ERNANDEZ   Supportive Care Services: Salome ANDERSON LMSW    Diagnosis: Ovarian      Follow Up Instructions:   - Labs reviewed  - CT scan reviewed  - Will plan to restart carbo/taxol after the new year    Follow up week of January 6th with Dr. Villalba, labs prior  Infusion after office visit for Carbo/Taxol    Treatment Summary has been discussed and given to patient:No      Current Labs:   No visits with results within 3 Day(s) from this visit.   Latest known visit with results is:   Hospital Outpatient Visit on 11/20/2024   Component Date Value Ref Range Status     11/20/2024 303 (H)  0 - 38 U/mL Final    Comment: Roche ECLIA methodology  Patient's results of tumor marker testing may not be comparable to labs using different manufacturers/methods.      Sodium 11/20/2024 137  136 - 145 mmol/L Final    Potassium 11/20/2024 4.7  3.5 - 5.1 mmol/L Final    Chloride 11/20/2024 101  98 - 107 mmol/L Final    CO2 11/20/2024 26  20 - 29 mmol/L Final    Anion Gap 11/20/2024 10  7 - 16 mmol/L Final    Glucose 11/20/2024 159 (H)  70 - 99 mg/dL Final    Comment: <70 mg/dL Consistent with, but not fully diagnostic of hypoglycemia.  100 - 125 mg/dL Impaired fasting glucose/consistent with pre-diabetes mellitus.  > 126 mg/dl Fasting glucose consistent with overt diabetes mellitus      BUN 11/20/2024 21  8 - 23 MG/DL Final    Creatinine 11/20/2024 1.13 (H)  0.60 - 1.10 MG/DL Final    Est, Glom Filt Rate 11/20/2024 47 (L)  >60 ml/min/1.73m2 Final    Comment:    Pediatric calculator link: https://www.kidney.org/professionals/kdoqi/gfr_calculatorped     These results are not intended for use in patients <18 years of age.     eGFR results

## 2024-12-02 NOTE — ONCOLOGY
START ON PATHWAY REGIMEN - Ovarian    [id number]        Paclitaxel       Carboplatin     **Always confirm dose/schedule in your pharmacy ordering system**    Patient Characteristics:  Recurrent or Progressive Disease, Second Line, Platinum Sensitive and ? 6 Months Since Last Therapy, Not a Candidate for Secondary Debulking Surgery  BRCA Mutation Status: Absent  Therapeutic Status: Recurrent or Progressive Disease  Line of Therapy: Second Line

## 2024-12-02 NOTE — PROGRESS NOTES
[facility] Hematology and Oncology: Office Visit Established Patient    Chief Complaint:    Chief Complaint   Patient presents with    Follow-up         History of Present Illness:  Ms. Mayen is a 89 y.o. female who returns today for management of ovarian cancer.  She had a PET/CT in March 2022 after CT abdomen was done for abdominal discomfort and blood per rectum and showed an abdominal mass.  PET scan revealed hypermetabolic multiple abdominal masses as well as a right cardiophrenic angle mass concerning for malignancy.  A hypermetabolic 4.1 cm fluid and gas containing collection located at rectosigmoid junction and uterine fundus concerning for possible abscess also noted.  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  Her CA-125 was highly elevated.  She saw Dr. Sidhu and he treated her with palliative carbo/Taxol, she did not complete the Taxol due to performance status decline.  She had a good response to therapy and was started on maintenance PARPi (nonBRCA mutated), but stopped in November 2023 due to increasing fatigue.  Most recently, her imaging in May 2024 was negative for metastatic disease, CR - she has been observed with plan for repeat imaging PRN and ongoing CA-125 monitoring.  August 2024 her CA-125 was increased to 25 from 54.  This may be related to the recent analyzer change but the jump is significant enough and the timing is coincident enough with the time off therapy that I would be concerned about early progression.  We discussed repeat imaging, she wishes to wait another 2-3 months to recheck the Ca-125 and then repeat imaging if it continues to climb.  Unfortunately, the increase was even more hernandez at that visit, from 54 to 303, so we recommended restaging CT.       History of Present Illness  The patient is an 89-year-old female here for a follow-up of ovarian cancer, a rising CA-125 level, and a review of her restaging CT scan.  She is

## 2025-01-02 ENCOUNTER — TELEPHONE (OUTPATIENT)
Dept: ONCOLOGY | Age: 89
End: 2025-01-02

## 2025-01-02 NOTE — TELEPHONE ENCOUNTER
pt would like to have dr and inf appt on the same day because they live 1.5 hours away     [phone]

## 2025-01-14 DIAGNOSIS — C56.9 OVARY CANCER, UNSPECIFIED LATERALITY (HCC): ICD-10-CM

## 2025-01-14 DIAGNOSIS — Z72.89 OTHER PROBLEMS RELATED TO LIFESTYLE: ICD-10-CM

## 2025-01-14 DIAGNOSIS — Z11.59 ENCOUNTER FOR SCREENING FOR OTHER VIRAL DISEASES: ICD-10-CM

## 2025-01-14 DIAGNOSIS — C54.9 MIXED MULLERIAN TUMOR (HCC): Primary | ICD-10-CM

## 2025-01-15 ENCOUNTER — OFFICE VISIT (OUTPATIENT)
Dept: ONCOLOGY | Age: 89
End: 2025-01-15
Payer: MEDICARE

## 2025-01-15 ENCOUNTER — HOSPITAL ENCOUNTER (OUTPATIENT)
Dept: INFUSION THERAPY | Age: 89
Setting detail: INFUSION SERIES
Discharge: HOME | End: 2025-01-15
Payer: MEDICARE

## 2025-01-15 ENCOUNTER — HOSPITAL ENCOUNTER (OUTPATIENT)
Dept: LAB | Age: 89
Discharge: HOME | End: 2025-01-15
Payer: MEDICARE

## 2025-01-15 VITALS
TEMPERATURE: 98.5 F | BODY MASS INDEX: 34.23 KG/M2 | RESPIRATION RATE: 12 BRPM | DIASTOLIC BLOOD PRESSURE: 64 MMHG | SYSTOLIC BLOOD PRESSURE: 141 MMHG | WEIGHT: 186 LBS | HEIGHT: 62 IN | OXYGEN SATURATION: 94 % | HEART RATE: 82 BPM

## 2025-01-15 DIAGNOSIS — C54.9 MIXED MULLERIAN TUMOR (HCC): ICD-10-CM

## 2025-01-15 DIAGNOSIS — Z11.59 ENCOUNTER FOR SCREENING FOR OTHER VIRAL DISEASES: ICD-10-CM

## 2025-01-15 DIAGNOSIS — C56.9 OVARY CANCER, UNSPECIFIED LATERALITY (HCC): Primary | ICD-10-CM

## 2025-01-15 DIAGNOSIS — R11.0 CHEMOTHERAPY-INDUCED NAUSEA: ICD-10-CM

## 2025-01-15 DIAGNOSIS — Z72.89 OTHER PROBLEMS RELATED TO LIFESTYLE: ICD-10-CM

## 2025-01-15 DIAGNOSIS — C56.9 OVARY CANCER, UNSPECIFIED LATERALITY (HCC): ICD-10-CM

## 2025-01-15 DIAGNOSIS — T45.1X5A CHEMOTHERAPY-INDUCED NAUSEA: ICD-10-CM

## 2025-01-15 DIAGNOSIS — N18.4 CHRONIC KIDNEY DISEASE, STAGE 4 (SEVERE) (HCC): ICD-10-CM

## 2025-01-15 DIAGNOSIS — C54.9 MIXED MULLERIAN TUMOR (HCC): Primary | ICD-10-CM

## 2025-01-15 LAB
ALBUMIN SERPL-MCNC: 3.5 G/DL (ref 3.2–4.6)
ALBUMIN/GLOB SERPL: 0.8 (ref 1–1.9)
ALP SERPL-CCNC: 115 U/L (ref 35–104)
ALT SERPL-CCNC: 14 U/L (ref 8–45)
ANION GAP SERPL CALC-SCNC: 11 MMOL/L (ref 7–16)
AST SERPL-CCNC: 22 U/L (ref 15–37)
BASOPHILS # BLD: 0.03 K/UL (ref 0–0.2)
BASOPHILS NFR BLD: 0.3 % (ref 0–2)
BILIRUB SERPL-MCNC: 0.5 MG/DL (ref 0–1.2)
BUN SERPL-MCNC: 26 MG/DL (ref 8–23)
CALCIUM SERPL-MCNC: 9.5 MG/DL (ref 8.8–10.2)
CANCER AG125 SERPL-ACNC: 568 U/ML (ref 0–38)
CHLORIDE SERPL-SCNC: 104 MMOL/L (ref 98–107)
CO2 SERPL-SCNC: 25 MMOL/L (ref 20–29)
CREAT SERPL-MCNC: 1.15 MG/DL (ref 0.6–1.1)
DIFFERENTIAL METHOD BLD: ABNORMAL
EOSINOPHIL # BLD: 0.17 K/UL (ref 0–0.8)
EOSINOPHIL NFR BLD: 1.8 % (ref 0.5–7.8)
ERYTHROCYTE [DISTWIDTH] IN BLOOD BY AUTOMATED COUNT: 14.1 % (ref 11.9–14.6)
GLOBULIN SER CALC-MCNC: 4.2 G/DL (ref 2.3–3.5)
GLUCOSE SERPL-MCNC: 96 MG/DL (ref 70–99)
HBV SURFACE AB SERPL IA-ACNC: <3.5 MIU/ML
HBV SURFACE AG SER QL: NONREACTIVE
HCT VFR BLD AUTO: 43.2 % (ref 35.8–46.3)
HGB BLD-MCNC: 13.5 G/DL (ref 11.7–15.4)
IMM GRANULOCYTES # BLD AUTO: 0.05 K/UL (ref 0–0.5)
IMM GRANULOCYTES NFR BLD AUTO: 0.5 % (ref 0–5)
LYMPHOCYTES # BLD: 2.22 K/UL (ref 0.5–4.6)
LYMPHOCYTES NFR BLD: 23.8 % (ref 13–44)
MCH RBC QN AUTO: 30.5 PG (ref 26.1–32.9)
MCHC RBC AUTO-ENTMCNC: 31.3 G/DL (ref 31.4–35)
MCV RBC AUTO: 97.5 FL (ref 82–102)
MONOCYTES # BLD: 0.82 K/UL (ref 0.1–1.3)
MONOCYTES NFR BLD: 8.8 % (ref 4–12)
NEUTS SEG # BLD: 6.04 K/UL (ref 1.7–8.2)
NEUTS SEG NFR BLD: 64.8 % (ref 43–78)
NRBC # BLD: 0 K/UL (ref 0–0.2)
PLATELET # BLD AUTO: 241 K/UL (ref 150–450)
PMV BLD AUTO: 9.1 FL (ref 9.4–12.3)
POTASSIUM SERPL-SCNC: 4.2 MMOL/L (ref 3.5–5.1)
PROT SERPL-MCNC: 7.6 G/DL (ref 6.3–8.2)
RBC # BLD AUTO: 4.43 M/UL (ref 4.05–5.2)
SODIUM SERPL-SCNC: 140 MMOL/L (ref 136–145)
WBC # BLD AUTO: 9.3 K/UL (ref 4.3–11.1)

## 2025-01-15 PROCEDURE — 96375 TX/PRO/DX INJ NEW DRUG ADDON: CPT

## 2025-01-15 PROCEDURE — 96367 TX/PROPH/DG ADDL SEQ IV INF: CPT

## 2025-01-15 PROCEDURE — 1090F PRES/ABSN URINE INCON ASSESS: CPT | Performed by: INTERNAL MEDICINE

## 2025-01-15 PROCEDURE — 6370000000 HC RX 637 (ALT 250 FOR IP): Performed by: INTERNAL MEDICINE

## 2025-01-15 PROCEDURE — 1123F ACP DISCUSS/DSCN MKR DOCD: CPT | Performed by: INTERNAL MEDICINE

## 2025-01-15 PROCEDURE — G8417 CALC BMI ABV UP PARAM F/U: HCPCS | Performed by: INTERNAL MEDICINE

## 2025-01-15 PROCEDURE — 85025 COMPLETE CBC W/AUTO DIFF WBC: CPT

## 2025-01-15 PROCEDURE — 99214 OFFICE O/P EST MOD 30 MIN: CPT | Performed by: INTERNAL MEDICINE

## 2025-01-15 PROCEDURE — 6360000002 HC RX W HCPCS: Performed by: INTERNAL MEDICINE

## 2025-01-15 PROCEDURE — 1036F TOBACCO NON-USER: CPT | Performed by: INTERNAL MEDICINE

## 2025-01-15 PROCEDURE — 86706 HEP B SURFACE ANTIBODY: CPT

## 2025-01-15 PROCEDURE — 96417 CHEMO IV INFUS EACH ADDL SEQ: CPT

## 2025-01-15 PROCEDURE — 2580000003 HC RX 258: Performed by: INTERNAL MEDICINE

## 2025-01-15 PROCEDURE — 2500000003 HC RX 250 WO HCPCS: Performed by: INTERNAL MEDICINE

## 2025-01-15 PROCEDURE — 96413 CHEMO IV INFUSION 1 HR: CPT

## 2025-01-15 PROCEDURE — 80053 COMPREHEN METABOLIC PANEL: CPT

## 2025-01-15 PROCEDURE — 86704 HEP B CORE ANTIBODY TOTAL: CPT

## 2025-01-15 PROCEDURE — 1126F AMNT PAIN NOTED NONE PRSNT: CPT | Performed by: INTERNAL MEDICINE

## 2025-01-15 PROCEDURE — 86304 IMMUNOASSAY TUMOR CA 125: CPT

## 2025-01-15 PROCEDURE — G8428 CUR MEDS NOT DOCUMENT: HCPCS | Performed by: INTERNAL MEDICINE

## 2025-01-15 PROCEDURE — 96415 CHEMO IV INFUSION ADDL HR: CPT

## 2025-01-15 PROCEDURE — 87340 HEPATITIS B SURFACE AG IA: CPT

## 2025-01-15 RX ORDER — SODIUM CHLORIDE 0.9 % (FLUSH) 0.9 %
5-40 SYRINGE (ML) INJECTION PRN
Status: DISCONTINUED | OUTPATIENT
Start: 2025-01-15 | End: 2025-01-16 | Stop reason: HOSPADM

## 2025-01-15 RX ORDER — PROCHLORPERAZINE EDISYLATE 5 MG/ML
5 INJECTION INTRAMUSCULAR; INTRAVENOUS
Status: CANCELLED | OUTPATIENT
Start: 2025-01-15

## 2025-01-15 RX ORDER — DIPHENHYDRAMINE HYDROCHLORIDE 50 MG/ML
50 INJECTION INTRAMUSCULAR; INTRAVENOUS
Status: DISCONTINUED | OUTPATIENT
Start: 2025-01-15 | End: 2025-01-16 | Stop reason: HOSPADM

## 2025-01-15 RX ORDER — HYDROCORTISONE SODIUM SUCCINATE 100 MG/2ML
100 INJECTION INTRAMUSCULAR; INTRAVENOUS
Status: DISCONTINUED | OUTPATIENT
Start: 2025-01-15 | End: 2025-01-16 | Stop reason: HOSPADM

## 2025-01-15 RX ORDER — ALBUTEROL SULFATE 90 UG/1
4 INHALANT RESPIRATORY (INHALATION) PRN
Status: CANCELLED | OUTPATIENT
Start: 2025-01-15

## 2025-01-15 RX ORDER — FAMOTIDINE 10 MG/ML
20 INJECTION, SOLUTION INTRAVENOUS ONCE
Status: CANCELLED | OUTPATIENT
Start: 2025-01-15 | End: 2025-01-15

## 2025-01-15 RX ORDER — ONDANSETRON 2 MG/ML
8 INJECTION INTRAMUSCULAR; INTRAVENOUS
Status: CANCELLED | OUTPATIENT
Start: 2025-01-15

## 2025-01-15 RX ORDER — DIPHENHYDRAMINE HYDROCHLORIDE 50 MG/ML
25 INJECTION INTRAMUSCULAR; INTRAVENOUS ONCE
Status: COMPLETED | OUTPATIENT
Start: 2025-01-15 | End: 2025-01-15

## 2025-01-15 RX ORDER — PROCHLORPERAZINE MALEATE 5 MG/1
5 TABLET ORAL EVERY 6 HOURS PRN
Qty: 60 TABLET | Refills: 1 | Status: SHIPPED | OUTPATIENT
Start: 2025-01-15

## 2025-01-15 RX ORDER — MEPERIDINE HYDROCHLORIDE 50 MG/ML
12.5 INJECTION INTRAMUSCULAR; INTRAVENOUS; SUBCUTANEOUS PRN
Status: CANCELLED | OUTPATIENT
Start: 2025-01-15

## 2025-01-15 RX ORDER — HEPARIN SODIUM (PORCINE) LOCK FLUSH IV SOLN 100 UNIT/ML 100 UNIT/ML
500 SOLUTION INTRAVENOUS PRN
Status: CANCELLED | OUTPATIENT
Start: 2025-01-15

## 2025-01-15 RX ORDER — EPINEPHRINE 1 MG/ML
0.3 INJECTION, SOLUTION, CONCENTRATE INTRAVENOUS PRN
Status: DISCONTINUED | OUTPATIENT
Start: 2025-01-15 | End: 2025-01-16 | Stop reason: HOSPADM

## 2025-01-15 RX ORDER — DEXAMETHASONE SODIUM PHOSPHATE 10 MG/ML
10 INJECTION INTRAMUSCULAR; INTRAVENOUS ONCE
Status: COMPLETED | OUTPATIENT
Start: 2025-01-15 | End: 2025-01-15

## 2025-01-15 RX ORDER — SODIUM CHLORIDE 0.9 % (FLUSH) 0.9 %
5-40 SYRINGE (ML) INJECTION PRN
Status: ACTIVE | OUTPATIENT
Start: 2025-01-15 | End: 2025-01-15

## 2025-01-15 RX ORDER — ONDANSETRON 8 MG/1
8 TABLET, FILM COATED ORAL EVERY 8 HOURS PRN
Qty: 30 TABLET | Refills: 2 | Status: SHIPPED | OUTPATIENT
Start: 2025-01-15

## 2025-01-15 RX ORDER — ALBUTEROL SULFATE 90 UG/1
4 INHALANT RESPIRATORY (INHALATION) PRN
Status: DISCONTINUED | OUTPATIENT
Start: 2025-01-15 | End: 2025-01-16 | Stop reason: HOSPADM

## 2025-01-15 RX ORDER — EPINEPHRINE 1 MG/ML
0.3 INJECTION, SOLUTION, CONCENTRATE INTRAVENOUS PRN
Status: CANCELLED | OUTPATIENT
Start: 2025-01-15

## 2025-01-15 RX ORDER — FAMOTIDINE 10 MG/ML
20 INJECTION, SOLUTION INTRAVENOUS
Status: CANCELLED | OUTPATIENT
Start: 2025-01-15

## 2025-01-15 RX ORDER — ACETAMINOPHEN 325 MG/1
650 TABLET ORAL
Status: COMPLETED | OUTPATIENT
Start: 2025-01-15 | End: 2025-01-15

## 2025-01-15 RX ORDER — ONDANSETRON 2 MG/ML
8 INJECTION INTRAMUSCULAR; INTRAVENOUS ONCE
Status: COMPLETED | OUTPATIENT
Start: 2025-01-15 | End: 2025-01-15

## 2025-01-15 RX ORDER — DIPHENHYDRAMINE HYDROCHLORIDE 50 MG/ML
25 INJECTION INTRAMUSCULAR; INTRAVENOUS ONCE
Status: CANCELLED | OUTPATIENT
Start: 2025-01-15 | End: 2025-01-15

## 2025-01-15 RX ORDER — HYDROCORTISONE SODIUM SUCCINATE 100 MG/2ML
100 INJECTION INTRAMUSCULAR; INTRAVENOUS
Status: CANCELLED | OUTPATIENT
Start: 2025-01-15

## 2025-01-15 RX ORDER — SODIUM CHLORIDE 9 MG/ML
INJECTION, SOLUTION INTRAVENOUS CONTINUOUS
Status: CANCELLED | OUTPATIENT
Start: 2025-01-15

## 2025-01-15 RX ORDER — SODIUM CHLORIDE 9 MG/ML
5-250 INJECTION, SOLUTION INTRAVENOUS PRN
Status: CANCELLED | OUTPATIENT
Start: 2025-01-15

## 2025-01-15 RX ORDER — ACETAMINOPHEN 325 MG/1
650 TABLET ORAL
Status: CANCELLED | OUTPATIENT
Start: 2025-01-15

## 2025-01-15 RX ORDER — DIPHENHYDRAMINE HYDROCHLORIDE 50 MG/ML
50 INJECTION INTRAMUSCULAR; INTRAVENOUS
Status: CANCELLED | OUTPATIENT
Start: 2025-01-15

## 2025-01-15 RX ORDER — ONDANSETRON 2 MG/ML
8 INJECTION INTRAMUSCULAR; INTRAVENOUS
Status: DISCONTINUED | OUTPATIENT
Start: 2025-01-15 | End: 2025-01-16 | Stop reason: HOSPADM

## 2025-01-15 RX ORDER — ONDANSETRON 2 MG/ML
8 INJECTION INTRAMUSCULAR; INTRAVENOUS ONCE
Status: CANCELLED | OUTPATIENT
Start: 2025-01-15 | End: 2025-01-15

## 2025-01-15 RX ORDER — DIPHENHYDRAMINE HYDROCHLORIDE 50 MG/ML
50 INJECTION INTRAMUSCULAR; INTRAVENOUS ONCE
Status: CANCELLED | OUTPATIENT
Start: 2025-01-15 | End: 2025-01-15

## 2025-01-15 RX ORDER — SODIUM CHLORIDE 0.9 % (FLUSH) 0.9 %
5-40 SYRINGE (ML) INJECTION PRN
Status: CANCELLED | OUTPATIENT
Start: 2025-01-15

## 2025-01-15 RX ORDER — MEPERIDINE HYDROCHLORIDE 25 MG/ML
12.5 INJECTION INTRAMUSCULAR; INTRAVENOUS; SUBCUTANEOUS PRN
Status: DISCONTINUED | OUTPATIENT
Start: 2025-01-15 | End: 2025-01-16 | Stop reason: HOSPADM

## 2025-01-15 RX ADMIN — CARBOPLATIN 300 MG: 10 INJECTION, SOLUTION INTRAVENOUS at 13:52

## 2025-01-15 RX ADMIN — SODIUM CHLORIDE, PRESERVATIVE FREE 10 ML: 5 INJECTION INTRAVENOUS at 09:47

## 2025-01-15 RX ADMIN — ONDANSETRON 8 MG: 2 INJECTION, SOLUTION INTRAMUSCULAR; INTRAVENOUS at 10:02

## 2025-01-15 RX ADMIN — DEXAMETHASONE SODIUM PHOSPHATE 10 MG: 10 INJECTION INTRAMUSCULAR; INTRAVENOUS at 10:03

## 2025-01-15 RX ADMIN — DIPHENHYDRAMINE HYDROCHLORIDE 25 MG: 50 INJECTION INTRAMUSCULAR; INTRAVENOUS at 10:04

## 2025-01-15 RX ADMIN — PACLITAXEL 342 MG: 6 INJECTION, SOLUTION, CONCENTRATE INTRAVENOUS at 10:42

## 2025-01-15 RX ADMIN — FAMOTIDINE 20 MG: 10 INJECTION, SOLUTION INTRAVENOUS at 10:00

## 2025-01-15 RX ADMIN — SODIUM CHLORIDE 150 MG: 9 INJECTION, SOLUTION INTRAVENOUS at 10:10

## 2025-01-15 RX ADMIN — SODIUM CHLORIDE, PRESERVATIVE FREE 20 ML: 5 INJECTION INTRAVENOUS at 07:50

## 2025-01-15 RX ADMIN — ACETAMINOPHEN 650 MG: 325 TABLET ORAL at 13:03

## 2025-01-15 ASSESSMENT — PATIENT HEALTH QUESTIONNAIRE - PHQ9
SUM OF ALL RESPONSES TO PHQ QUESTIONS 1-9: 0
1. LITTLE INTEREST OR PLEASURE IN DOING THINGS: NOT AT ALL
SUM OF ALL RESPONSES TO PHQ9 QUESTIONS 1 & 2: 0
1. LITTLE INTEREST OR PLEASURE IN DOING THINGS: NOT AT ALL
SUM OF ALL RESPONSES TO PHQ QUESTIONS 1-9: 0
2. FEELING DOWN, DEPRESSED OR HOPELESS: NOT AT ALL
2. FEELING DOWN, DEPRESSED OR HOPELESS: NOT AT ALL
SUM OF ALL RESPONSES TO PHQ9 QUESTIONS 1 & 2: 0

## 2025-01-15 NOTE — PATIENT INSTRUCTIONS
Patient Information from Today's Visit    The members of your Oncology Medical Home are listed below:    Physician Provider: Robert Villalba Medical Oncologist  Advanced Practice Clinician: Debo Bland NP  Registered Nurse: Elías NGUYỄN RN  Navigator: N/A  Medical Assistant: Diamond ALEXANDER MA  : Светлана ERNANDEZ   Supportive Care Services: Salome ANDERSON LMSW    Diagnosis:       Follow Up Instructions:   - proceed with treatment       Treatment Summary has been discussed and given to patient:No      Current Labs:   Hospital Outpatient Visit on 01/15/2025   Component Date Value Ref Range Status     01/15/2025 568 (H)  0 - 38 U/mL Final    Comment: Roche ECLIA methodology  Patient's results of tumor marker testing may not be comparable to labs using different manufacturers/methods.      Sodium 01/15/2025 140  136 - 145 mmol/L Final    Potassium 01/15/2025 4.2  3.5 - 5.1 mmol/L Final    Chloride 01/15/2025 104  98 - 107 mmol/L Final    CO2 01/15/2025 25  20 - 29 mmol/L Final    Anion Gap 01/15/2025 11  7 - 16 mmol/L Final    Glucose 01/15/2025 96  70 - 99 mg/dL Final    Comment: <70 mg/dL Consistent with, but not fully diagnostic of hypoglycemia.  100 - 125 mg/dL Impaired fasting glucose/consistent with pre-diabetes mellitus.  > 126 mg/dl Fasting glucose consistent with overt diabetes mellitus      BUN 01/15/2025 26 (H)  8 - 23 MG/DL Final    Creatinine 01/15/2025 1.15 (H)  0.60 - 1.10 MG/DL Final    Est, Glom Filt Rate 01/15/2025 46 (L)  >60 ml/min/1.73m2 Final    Comment:    Pediatric calculator link: https://www.kidney.org/professionals/kdoqi/gfr_calculatorped     These results are not intended for use in patients <18 years of age.     eGFR results are calculated without a race factor using  the 2021 CKD-EPI equation. Careful clinical correlation is recommended, particularly when comparing to results calculated using previous equations.  The CKD-EPI equation is less accurate in patients with extremes of muscle mass,

## 2025-01-15 NOTE — PROGRESS NOTES
Vital Sign     Worried About Running Out of Food in the Last Year: Never true     Ran Out of Food in the Last Year: Never true   Transportation Needs: Unknown (10/25/2024)    PRAPARE - Transportation     Lack of Transportation (Medical): Not on file     Lack of Transportation (Non-Medical): No   Physical Activity: Insufficiently Active (10/25/2024)    Exercise Vital Sign     Days of Exercise per Week: 5 days     Minutes of Exercise per Session: 20 min   Stress: No Stress Concern Present (1/17/2023)    [employer] [place] [facility] [employer] [facility] - Occupational Stress Questionnaire     Feeling of Stress : Not at all   Social Connections: Unknown (8/16/2023)    Received from [facility]    Social Connections     Frequency of Communication with Friends and Family: Not asked     Frequency of Social Gatherings with Friends and Family: Not asked   Intimate Partner Violence: Unknown (8/16/2023)    Received from [facility]    Intimate Partner Violence     Fear of Current or Ex-Partner: Not asked     Emotionally Abused: Not asked     Physically Abused: Not asked     Sexually Abused: Not asked   Housing Stability: Unknown (10/25/2024)    Housing Stability Vital Sign     Unable to Pay for Housing in the Last Year: Not on file     Number of Times Moved in the Last Year: Not on file     Homeless in the Last Year: No     Current Outpatient Medications   Medication Sig Dispense Refill    triamcinolone (ARISTOCORT) 0.5 % cream Apply topically 2 times daily Apply topically 3 times daily. 60 g 1    vitamin D (ERGOCALCIFEROL) 1.25 MG (02170 UT) CAPS capsule Take 1 capsule by mouth once a week 4 capsule 0    Cholecalciferol (VITAMIN D3) 50 MCG (2000 UT) CAPS Take by mouth      Omega-3 Fatty Acids (FISH OIL) 1000 MG CPDR Take 3 capsules by mouth      Loratadine (CLARITIN PO) Take by mouth      Multiple Minerals-Vitamins (CALCIUM-MAGNESIUM-ZINC-D3 PO) Take by mouth daily      Magnesium Hydroxide (DULCOLAX

## 2025-01-15 NOTE — PROGRESS NOTES
Arrived to the Infusion Center.  C1D1 Taxol/Carboplatin completed. Patient tolerated well.   Any issues or concerns during appointment: none. New Chemotherapy consent signed today.   Patient aware of next infusion appointment on 2/5/25 (date) at 0930 (time).  Patient aware of next lab and [facility] office visit on 2/5/25 (date) at 0740 (time).  Patient instructed to call provider with temperature of 100.4 or greater or nausea/vomiting/ diarrhea or pain not controlled by medications  Discharged home ambulatory after 30 minute observation period.

## 2025-01-15 NOTE — PROGRESS NOTES
Patient to port lab for port access and lab draw. Port accessed per protocol; using 20g 0.75\" griffiths needle without difficulty. Labs drawn from port and port flushed. Port remains accessed. Patient tolerated well. Discharged ambulatory.

## 2025-01-16 LAB — HBV CORE AB SERPL QL IA: NEGATIVE

## 2025-01-19 ENCOUNTER — TELEPHONE (OUTPATIENT)
Dept: ONCOLOGY | Age: 89
End: 2025-01-19

## 2025-02-04 DIAGNOSIS — Z79.899 HIGH RISK MEDICATION USE: ICD-10-CM

## 2025-02-04 DIAGNOSIS — C56.9 OVARY CANCER, UNSPECIFIED LATERALITY (HCC): Primary | ICD-10-CM

## 2025-02-05 ENCOUNTER — HOSPITAL ENCOUNTER (OUTPATIENT)
Dept: LAB | Age: 89
Setting detail: INFUSION SERIES
Discharge: HOME | End: 2025-02-05
Payer: MEDICARE

## 2025-02-05 ENCOUNTER — HOSPITAL ENCOUNTER (OUTPATIENT)
Dept: INFUSION THERAPY | Age: 89
Setting detail: INFUSION SERIES
Discharge: HOME | End: 2025-02-05
Payer: MEDICARE

## 2025-02-05 ENCOUNTER — OFFICE VISIT (OUTPATIENT)
Dept: ONCOLOGY | Age: 89
End: 2025-02-05
Payer: MEDICARE

## 2025-02-05 VITALS
RESPIRATION RATE: 18 BRPM | SYSTOLIC BLOOD PRESSURE: 159 MMHG | HEART RATE: 81 BPM | WEIGHT: 183 LBS | TEMPERATURE: 98.6 F | DIASTOLIC BLOOD PRESSURE: 56 MMHG | HEIGHT: 62 IN | BODY MASS INDEX: 33.68 KG/M2 | OXYGEN SATURATION: 97 %

## 2025-02-05 DIAGNOSIS — Z11.59 ENCOUNTER FOR SCREENING FOR OTHER VIRAL DISEASES: ICD-10-CM

## 2025-02-05 DIAGNOSIS — Z72.89 OTHER PROBLEMS RELATED TO LIFESTYLE: ICD-10-CM

## 2025-02-05 DIAGNOSIS — C56.9 OVARY CANCER, UNSPECIFIED LATERALITY (HCC): Primary | ICD-10-CM

## 2025-02-05 DIAGNOSIS — N18.4 CHRONIC KIDNEY DISEASE, STAGE 4 (SEVERE) (HCC): ICD-10-CM

## 2025-02-05 DIAGNOSIS — C54.9 MIXED MULLERIAN TUMOR (HCC): ICD-10-CM

## 2025-02-05 DIAGNOSIS — J44.9 CHRONIC OBSTRUCTIVE PULMONARY DISEASE, UNSPECIFIED COPD TYPE (HCC): ICD-10-CM

## 2025-02-05 DIAGNOSIS — Z79.899 HIGH RISK MEDICATION USE: ICD-10-CM

## 2025-02-05 DIAGNOSIS — C56.9 OVARY CANCER, UNSPECIFIED LATERALITY (HCC): ICD-10-CM

## 2025-02-05 DIAGNOSIS — C54.9 MIXED MULLERIAN TUMOR (HCC): Primary | ICD-10-CM

## 2025-02-05 LAB
ALBUMIN SERPL-MCNC: 3.4 G/DL (ref 3.2–4.6)
ALBUMIN/GLOB SERPL: 0.8 (ref 1–1.9)
ALP SERPL-CCNC: 137 U/L (ref 35–104)
ALT SERPL-CCNC: 17 U/L (ref 8–45)
ANION GAP SERPL CALC-SCNC: 14 MMOL/L (ref 7–16)
AST SERPL-CCNC: 25 U/L (ref 15–37)
BASOPHILS # BLD: 0.14 K/UL (ref 0–0.2)
BASOPHILS NFR BLD: 1.2 % (ref 0–2)
BILIRUB SERPL-MCNC: 0.3 MG/DL (ref 0–1.2)
BUN SERPL-MCNC: 23 MG/DL (ref 8–23)
CALCIUM SERPL-MCNC: 9.9 MG/DL (ref 8.8–10.2)
CANCER AG125 SERPL-ACNC: 578 U/ML (ref 0–38)
CHLORIDE SERPL-SCNC: 100 MMOL/L (ref 98–107)
CO2 SERPL-SCNC: 25 MMOL/L (ref 20–29)
CREAT SERPL-MCNC: 1.13 MG/DL (ref 0.6–1.1)
DIFFERENTIAL METHOD BLD: ABNORMAL
EOSINOPHIL # BLD: 0.2 K/UL (ref 0–0.8)
EOSINOPHIL NFR BLD: 1.7 % (ref 0.5–7.8)
ERYTHROCYTE [DISTWIDTH] IN BLOOD BY AUTOMATED COUNT: 13.9 % (ref 11.9–14.6)
GLOBULIN SER CALC-MCNC: 4.4 G/DL (ref 2.3–3.5)
GLUCOSE SERPL-MCNC: 102 MG/DL (ref 70–99)
HCT VFR BLD AUTO: 39.5 % (ref 35.8–46.3)
HGB BLD-MCNC: 12.7 G/DL (ref 11.7–15.4)
IMM GRANULOCYTES # BLD AUTO: 0.4 K/UL (ref 0–0.5)
IMM GRANULOCYTES NFR BLD AUTO: 3.3 % (ref 0–5)
LYMPHOCYTES # BLD: 2.87 K/UL (ref 0.5–4.6)
LYMPHOCYTES NFR BLD: 24 % (ref 13–44)
MAGNESIUM SERPL-MCNC: 2.3 MG/DL (ref 1.8–2.4)
MCH RBC QN AUTO: 30.5 PG (ref 26.1–32.9)
MCHC RBC AUTO-ENTMCNC: 32.2 G/DL (ref 31.4–35)
MCV RBC AUTO: 94.7 FL (ref 82–102)
MONOCYTES # BLD: 1.37 K/UL (ref 0.1–1.3)
MONOCYTES NFR BLD: 11.4 % (ref 4–12)
NEUTS SEG # BLD: 6.99 K/UL (ref 1.7–8.2)
NEUTS SEG NFR BLD: 58.4 % (ref 43–78)
NRBC # BLD: 0 K/UL (ref 0–0.2)
PLATELET # BLD AUTO: 357 K/UL (ref 150–450)
PMV BLD AUTO: 8.7 FL (ref 9.4–12.3)
POTASSIUM SERPL-SCNC: 4.3 MMOL/L (ref 3.5–5.1)
PROT SERPL-MCNC: 7.8 G/DL (ref 6.3–8.2)
RBC # BLD AUTO: 4.17 M/UL (ref 4.05–5.2)
SODIUM SERPL-SCNC: 139 MMOL/L (ref 136–145)
WBC # BLD AUTO: 12 K/UL (ref 4.3–11.1)

## 2025-02-05 PROCEDURE — 86304 IMMUNOASSAY TUMOR CA 125: CPT

## 2025-02-05 PROCEDURE — 1123F ACP DISCUSS/DSCN MKR DOCD: CPT | Performed by: INTERNAL MEDICINE

## 2025-02-05 PROCEDURE — 2580000003 HC RX 258: Performed by: INTERNAL MEDICINE

## 2025-02-05 PROCEDURE — 96375 TX/PRO/DX INJ NEW DRUG ADDON: CPT

## 2025-02-05 PROCEDURE — 96415 CHEMO IV INFUSION ADDL HR: CPT

## 2025-02-05 PROCEDURE — 1036F TOBACCO NON-USER: CPT | Performed by: INTERNAL MEDICINE

## 2025-02-05 PROCEDURE — 2500000003 HC RX 250 WO HCPCS: Performed by: INTERNAL MEDICINE

## 2025-02-05 PROCEDURE — 6360000002 HC RX W HCPCS: Performed by: INTERNAL MEDICINE

## 2025-02-05 PROCEDURE — 96413 CHEMO IV INFUSION 1 HR: CPT

## 2025-02-05 PROCEDURE — G8417 CALC BMI ABV UP PARAM F/U: HCPCS | Performed by: INTERNAL MEDICINE

## 2025-02-05 PROCEDURE — 96367 TX/PROPH/DG ADDL SEQ IV INF: CPT

## 2025-02-05 PROCEDURE — G8428 CUR MEDS NOT DOCUMENT: HCPCS | Performed by: INTERNAL MEDICINE

## 2025-02-05 PROCEDURE — 1126F AMNT PAIN NOTED NONE PRSNT: CPT | Performed by: INTERNAL MEDICINE

## 2025-02-05 PROCEDURE — 3023F SPIROM DOC REV: CPT | Performed by: INTERNAL MEDICINE

## 2025-02-05 PROCEDURE — 80053 COMPREHEN METABOLIC PANEL: CPT

## 2025-02-05 PROCEDURE — 85025 COMPLETE CBC W/AUTO DIFF WBC: CPT

## 2025-02-05 PROCEDURE — 99214 OFFICE O/P EST MOD 30 MIN: CPT | Performed by: INTERNAL MEDICINE

## 2025-02-05 PROCEDURE — 1090F PRES/ABSN URINE INCON ASSESS: CPT | Performed by: INTERNAL MEDICINE

## 2025-02-05 PROCEDURE — 36415 COLL VENOUS BLD VENIPUNCTURE: CPT

## 2025-02-05 PROCEDURE — 96417 CHEMO IV INFUS EACH ADDL SEQ: CPT

## 2025-02-05 PROCEDURE — 83735 ASSAY OF MAGNESIUM: CPT

## 2025-02-05 RX ORDER — SODIUM CHLORIDE 9 MG/ML
5-250 INJECTION, SOLUTION INTRAVENOUS PRN
Status: CANCELLED | OUTPATIENT
Start: 2025-02-05

## 2025-02-05 RX ORDER — DIPHENHYDRAMINE HYDROCHLORIDE 50 MG/ML
25 INJECTION INTRAMUSCULAR; INTRAVENOUS ONCE
Status: COMPLETED | OUTPATIENT
Start: 2025-02-05 | End: 2025-02-05

## 2025-02-05 RX ORDER — FAMOTIDINE 10 MG/ML
20 INJECTION, SOLUTION INTRAVENOUS ONCE
Status: CANCELLED | OUTPATIENT
Start: 2025-02-05 | End: 2025-02-05

## 2025-02-05 RX ORDER — ACETAMINOPHEN 325 MG/1
650 TABLET ORAL
Status: CANCELLED | OUTPATIENT
Start: 2025-02-05

## 2025-02-05 RX ORDER — DIPHENHYDRAMINE HYDROCHLORIDE 50 MG/ML
25 INJECTION INTRAMUSCULAR; INTRAVENOUS ONCE
Status: CANCELLED | OUTPATIENT
Start: 2025-02-05 | End: 2025-02-05

## 2025-02-05 RX ORDER — HYDROCORTISONE SODIUM SUCCINATE 100 MG/2ML
100 INJECTION INTRAMUSCULAR; INTRAVENOUS
Status: CANCELLED | OUTPATIENT
Start: 2025-02-05

## 2025-02-05 RX ORDER — PROCHLORPERAZINE EDISYLATE 5 MG/ML
5 INJECTION INTRAMUSCULAR; INTRAVENOUS
Status: CANCELLED | OUTPATIENT
Start: 2025-02-05

## 2025-02-05 RX ORDER — ACETAMINOPHEN 325 MG/1
650 TABLET ORAL
Status: DISCONTINUED | OUTPATIENT
Start: 2025-02-05 | End: 2025-02-06 | Stop reason: HOSPADM

## 2025-02-05 RX ORDER — DEXAMETHASONE SODIUM PHOSPHATE 10 MG/ML
10 INJECTION, SOLUTION INTRAMUSCULAR; INTRAVENOUS ONCE
Status: COMPLETED | OUTPATIENT
Start: 2025-02-05 | End: 2025-02-05

## 2025-02-05 RX ORDER — DIPHENHYDRAMINE HYDROCHLORIDE 50 MG/ML
50 INJECTION INTRAMUSCULAR; INTRAVENOUS
Status: DISCONTINUED | OUTPATIENT
Start: 2025-02-05 | End: 2025-02-06 | Stop reason: HOSPADM

## 2025-02-05 RX ORDER — ONDANSETRON 2 MG/ML
8 INJECTION INTRAMUSCULAR; INTRAVENOUS ONCE
Status: COMPLETED | OUTPATIENT
Start: 2025-02-05 | End: 2025-02-05

## 2025-02-05 RX ORDER — DIPHENHYDRAMINE HYDROCHLORIDE 50 MG/ML
50 INJECTION INTRAMUSCULAR; INTRAVENOUS
Status: CANCELLED | OUTPATIENT
Start: 2025-02-05

## 2025-02-05 RX ORDER — ONDANSETRON 2 MG/ML
8 INJECTION INTRAMUSCULAR; INTRAVENOUS
Status: CANCELLED | OUTPATIENT
Start: 2025-02-05

## 2025-02-05 RX ORDER — HEPARIN SODIUM (PORCINE) LOCK FLUSH IV SOLN 100 UNIT/ML 100 UNIT/ML
500 SOLUTION INTRAVENOUS PRN
Status: CANCELLED | OUTPATIENT
Start: 2025-02-05

## 2025-02-05 RX ORDER — EPINEPHRINE 1 MG/ML
0.3 INJECTION, SOLUTION, CONCENTRATE INTRAVENOUS PRN
Status: CANCELLED | OUTPATIENT
Start: 2025-02-05

## 2025-02-05 RX ORDER — SODIUM CHLORIDE 0.9 % (FLUSH) 0.9 %
5-40 SYRINGE (ML) INJECTION PRN
Status: CANCELLED | OUTPATIENT
Start: 2025-02-05

## 2025-02-05 RX ORDER — SODIUM CHLORIDE 0.9 % (FLUSH) 0.9 %
5-40 SYRINGE (ML) INJECTION PRN
Status: DISCONTINUED | OUTPATIENT
Start: 2025-02-05 | End: 2025-02-06 | Stop reason: HOSPADM

## 2025-02-05 RX ORDER — EPINEPHRINE 1 MG/ML
0.3 INJECTION, SOLUTION, CONCENTRATE INTRAVENOUS PRN
Status: DISCONTINUED | OUTPATIENT
Start: 2025-02-05 | End: 2025-02-06 | Stop reason: HOSPADM

## 2025-02-05 RX ORDER — SODIUM CHLORIDE 9 MG/ML
INJECTION, SOLUTION INTRAVENOUS CONTINUOUS
Status: CANCELLED | OUTPATIENT
Start: 2025-02-05

## 2025-02-05 RX ORDER — HYDROCORTISONE SODIUM SUCCINATE 100 MG/2ML
100 INJECTION INTRAMUSCULAR; INTRAVENOUS
Status: DISCONTINUED | OUTPATIENT
Start: 2025-02-05 | End: 2025-02-06 | Stop reason: HOSPADM

## 2025-02-05 RX ORDER — TRAMADOL HYDROCHLORIDE 50 MG/1
50 TABLET ORAL EVERY 6 HOURS PRN
Qty: 60 TABLET | Refills: 0 | Status: SHIPPED | OUTPATIENT
Start: 2025-02-05 | End: 2025-03-07

## 2025-02-05 RX ORDER — ONDANSETRON 2 MG/ML
8 INJECTION INTRAMUSCULAR; INTRAVENOUS
Status: DISCONTINUED | OUTPATIENT
Start: 2025-02-05 | End: 2025-02-06 | Stop reason: HOSPADM

## 2025-02-05 RX ORDER — ALBUTEROL SULFATE 90 UG/1
4 INHALANT RESPIRATORY (INHALATION) PRN
Status: DISCONTINUED | OUTPATIENT
Start: 2025-02-05 | End: 2025-02-06 | Stop reason: HOSPADM

## 2025-02-05 RX ORDER — MEPERIDINE HYDROCHLORIDE 50 MG/ML
12.5 INJECTION INTRAMUSCULAR; INTRAVENOUS; SUBCUTANEOUS PRN
Status: CANCELLED | OUTPATIENT
Start: 2025-02-05

## 2025-02-05 RX ORDER — ONDANSETRON 2 MG/ML
8 INJECTION INTRAMUSCULAR; INTRAVENOUS ONCE
Status: CANCELLED | OUTPATIENT
Start: 2025-02-05 | End: 2025-02-05

## 2025-02-05 RX ORDER — SODIUM CHLORIDE 9 MG/ML
INJECTION, SOLUTION INTRAVENOUS CONTINUOUS
Status: DISCONTINUED | OUTPATIENT
Start: 2025-02-05 | End: 2025-02-06 | Stop reason: HOSPADM

## 2025-02-05 RX ORDER — MEPERIDINE HYDROCHLORIDE 25 MG/ML
12.5 INJECTION INTRAMUSCULAR; INTRAVENOUS; SUBCUTANEOUS PRN
Status: DISCONTINUED | OUTPATIENT
Start: 2025-02-05 | End: 2025-02-06 | Stop reason: HOSPADM

## 2025-02-05 RX ORDER — ALBUTEROL SULFATE 90 UG/1
4 INHALANT RESPIRATORY (INHALATION) PRN
Status: CANCELLED | OUTPATIENT
Start: 2025-02-05

## 2025-02-05 RX ORDER — FAMOTIDINE 10 MG/ML
20 INJECTION, SOLUTION INTRAVENOUS
Status: CANCELLED | OUTPATIENT
Start: 2025-02-05

## 2025-02-05 RX ADMIN — DIPHENHYDRAMINE HYDROCHLORIDE 25 MG: 50 INJECTION INTRAMUSCULAR; INTRAVENOUS at 10:12

## 2025-02-05 RX ADMIN — DEXAMETHASONE SODIUM PHOSPHATE 10 MG: 10 INJECTION, SOLUTION INTRAMUSCULAR; INTRAVENOUS at 10:08

## 2025-02-05 RX ADMIN — SODIUM CHLORIDE, PRESERVATIVE FREE 10 ML: 5 INJECTION INTRAVENOUS at 09:37

## 2025-02-05 RX ADMIN — SODIUM CHLORIDE, PRESERVATIVE FREE 10 ML: 5 INJECTION INTRAVENOUS at 14:38

## 2025-02-05 RX ADMIN — CARBOPLATIN 300 MG: 10 INJECTION, SOLUTION INTRAVENOUS at 14:06

## 2025-02-05 RX ADMIN — SODIUM CHLORIDE 1 MG: 9 INJECTION INTRAMUSCULAR; INTRAVENOUS; SUBCUTANEOUS at 11:00

## 2025-02-05 RX ADMIN — PACLITAXEL 168 MG: 6 INJECTION, SOLUTION, CONCENTRATE INTRAVENOUS at 11:06

## 2025-02-05 RX ADMIN — SODIUM CHLORIDE, PRESERVATIVE FREE 10 ML: 5 INJECTION INTRAVENOUS at 07:44

## 2025-02-05 RX ADMIN — SODIUM CHLORIDE 150 MG: 9 INJECTION, SOLUTION INTRAVENOUS at 10:28

## 2025-02-05 RX ADMIN — FAMOTIDINE 20 MG: 10 INJECTION, SOLUTION INTRAVENOUS at 10:10

## 2025-02-05 RX ADMIN — ONDANSETRON 8 MG: 2 INJECTION, SOLUTION INTRAMUSCULAR; INTRAVENOUS at 10:06

## 2025-02-05 ASSESSMENT — PATIENT HEALTH QUESTIONNAIRE - PHQ9
SUM OF ALL RESPONSES TO PHQ QUESTIONS 1-9: 0
2. FEELING DOWN, DEPRESSED OR HOPELESS: NOT AT ALL
SUM OF ALL RESPONSES TO PHQ QUESTIONS 1-9: 0
SUM OF ALL RESPONSES TO PHQ9 QUESTIONS 1 & 2: 0
1. LITTLE INTEREST OR PLEASURE IN DOING THINGS: NOT AT ALL

## 2025-02-05 NOTE — PROGRESS NOTES
Patient arrived to port lab for port access and lab draw   Port accessed and labs drawn per protocol   Port remains accessed   Patient discharged from port lab ambulatory

## 2025-02-05 NOTE — PROGRESS NOTES
Arrived to the Infusion Center.  Taxol and Carboplatin completed. Patient tolerated without complication.   Any issues or concerns during appointment: Yes, anxiety unrelieved with comfort measures. Ativan administered.   Patient aware of next infusion appointment on 02/26/25 (date) at 0915 (time).  Patient instructed to call provider with temperature of 100.4 or greater or nausea/vomiting/ diarrhea or pain not controlled by medications  Discharged in wheelchair with son .

## 2025-02-05 NOTE — PROGRESS NOTES
[facility]: Office Visit Established Patient    Chief Complaint:    Chief Complaint   Patient presents with    Follow-up         History of Present Illness:  Ms. Mayen is a 89 y.o. female who returns today for management of ovarian cancer.  She had a PET/CT in March 2022 after CT abdomen was done for abdominal discomfort and blood per rectum and showed an abdominal mass.  PET scan revealed hypermetabolic multiple abdominal masses as well as a right cardiophrenic angle mass concerning for malignancy.  A hypermetabolic 4.1 cm fluid and gas containing collection located at rectosigmoid junction and uterine fundus concerning for possible abscess also noted.  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  Her CA-125 was highly elevated.  She saw Dr. Sidhu and he treated her with palliative carbo/Taxol, she did not complete the Taxol due to performance status decline.  She had a good response to therapy and was started on maintenance PARPi (nonBRCA mutated), but stopped in November 2023 due to increasing fatigue.  Most recently, her imaging in May 2024 was negative for metastatic disease, CR - she has been observed with plan for repeat imaging PRN and ongoing CA-125 monitoring.  August 2024 her CA-125 was increased to 25 from 54.  This may be related to the recent analyzer change but the jump is significant enough and the timing is coincident enough with the time off therapy that I would be concerned about early progression.  We discussed repeat imaging, she wishes to wait another 2-3 months to recheck the Ca-125 and then repeat imaging if it continues to climb.  Unfortunately, the increase was even more hernandez at that visit, from 54 to 303, so we recommended restaging CT.  CT reviewed and a new lymph node, approximately 4 cm in size, was identified in the external iliac region of the right pelvis, indicating a high likelihood of cancer recurrence. Surgery is

## 2025-02-05 NOTE — PATIENT INSTRUCTIONS
Patient Information from Today's Visit    The members of your [facility] are listed below:    Physician Provider: Robert Villalba, Medical Oncologist  Advanced Practice Clinician: Debo Bland NP  Registered Nurse: Elías NGUYỄN RN  Navigator: N/A  Medical Assistant: Diamond ALEXANDER MA  : Светлана ERNANDEZ   Supportive Care Services: Salome ANDERSON LMSW    Diagnosis: ovarian cancer      Follow Up Instructions:     Carbo/taxol today.    Follow up in 3 weeks.      Treatment Summary has been discussed and given to patient:N/A      Current Labs:   Hospital Outpatient Visit on 02/05/2025   Component Date Value Ref Range Status    WBC 02/05/2025 12.0 (H)  4.3 - 11.1 K/uL Final    RBC 02/05/2025 4.17  4.05 - 5.2 M/uL Final    Hemoglobin 02/05/2025 12.7  11.7 - 15.4 g/dL Final    Hematocrit 02/05/2025 39.5  35.8 - 46.3 % Final    MCV 02/05/2025 94.7  82.0 - 102.0 FL Final    MCH 02/05/2025 30.5  26.1 - 32.9 PG Final    MCHC 02/05/2025 32.2  31.4 - 35.0 g/dL Final    RDW 02/05/2025 13.9  11.9 - 14.6 % Final    Platelets 02/05/2025 357  150 - 450 K/uL Final    MPV 02/05/2025 8.7 (L)  9.4 - 12.3 FL Final    nRBC 02/05/2025 0.00  0.0 - 0.2 K/uL Final    **Note: Absolute NRBC parameter is now reported with Hemogram**    Neutrophils % 02/05/2025 58.4  43.0 - 78.0 % Final    Lymphocytes % 02/05/2025 24.0  13.0 - 44.0 % Final    Monocytes % 02/05/2025 11.4  4.0 - 12.0 % Final    Eosinophils % 02/05/2025 1.7  0.5 - 7.8 % Final    Basophils % 02/05/2025 1.2  0.0 - 2.0 % Final    Immature Granulocytes % 02/05/2025 3.3  0.0 - 5.0 % Final    Neutrophils Absolute 02/05/2025 6.99  1.70 - 8.20 K/UL Final    Lymphocytes Absolute 02/05/2025 2.87  0.50 - 4.60 K/UL Final    Monocytes Absolute 02/05/2025 1.37 (H)  0.10 - 1.30 K/UL Final    Eosinophils Absolute 02/05/2025 0.20  0.00 - 0.80 K/UL Final    Basophils Absolute 02/05/2025 0.14  0.00 - 0.20 K/UL Final    Immature Granulocytes Absolute 02/05/2025 0.40  0.00 - 0.50 K/UL Final

## 2025-02-25 NOTE — PROGRESS NOTES
[facility]: Office Visit Established Patient    Chief Complaint:    Chief Complaint   Patient presents with    Follow-up       History of Present Illness:  Ms. Mayen is a 89 y.o. female who returns today for management of ovarian cancer.  She had a PET/CT in March 2022 after CT abdomen was done for abdominal discomfort and blood per rectum and showed an abdominal mass.  PET scan revealed hypermetabolic multiple abdominal masses as well as a right cardiophrenic angle mass concerning for malignancy.  A hypermetabolic 4.1 cm fluid and gas containing collection located at rectosigmoid junction and uterine fundus concerning for possible abscess also noted.  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  Her CA-125 was highly elevated.  She saw Dr. Sidhu and he treated her with palliative carbo/Taxol, she did not complete the Taxol due to performance status decline.  She had a good response to therapy and was started on maintenance PARPi (nonBRCA mutated), but stopped in November 2023 due to increasing fatigue.  Most recently, her imaging in May 2024 was negative for metastatic disease, CR - she has been observed with plan for repeat imaging PRN and ongoing CA-125 monitoring.  August 2024 her CA-125 was increased to 25 from 54.  This may be related to the recent analyzer change but the jump is significant enough and the timing is coincident enough with the time off therapy that I would be concerned about early progression.  We discussed repeat imaging, she wishes to wait another 2-3 months to recheck the Ca-125 and then repeat imaging if it continues to climb.  Unfortunately, the increase was even more hernandez at that visit, from 54 to 303, so we recommended restaging CT.  CT reviewed and a new lymph node, approximately 4 cm in size, was identified in the external iliac region of the right pelvis, indicating a high likelihood of cancer recurrence. Surgery is not

## 2025-02-26 ENCOUNTER — OFFICE VISIT (OUTPATIENT)
Dept: ONCOLOGY | Age: 89
End: 2025-02-26
Payer: MEDICARE

## 2025-02-26 ENCOUNTER — HOSPITAL ENCOUNTER (OUTPATIENT)
Dept: LAB | Age: 89
Discharge: HOME | End: 2025-02-26
Payer: MEDICARE

## 2025-02-26 ENCOUNTER — HOSPITAL ENCOUNTER (OUTPATIENT)
Dept: INFUSION THERAPY | Age: 89
Setting detail: INFUSION SERIES
Discharge: HOME | End: 2025-02-26
Payer: MEDICARE

## 2025-02-26 VITALS
TEMPERATURE: 98.2 F | HEART RATE: 82 BPM | SYSTOLIC BLOOD PRESSURE: 187 MMHG | WEIGHT: 185.5 LBS | DIASTOLIC BLOOD PRESSURE: 73 MMHG | HEIGHT: 62 IN | RESPIRATION RATE: 16 BRPM | BODY MASS INDEX: 34.14 KG/M2

## 2025-02-26 DIAGNOSIS — C54.9 MIXED MULLERIAN TUMOR (HCC): Primary | ICD-10-CM

## 2025-02-26 DIAGNOSIS — C56.9 OVARY CANCER, UNSPECIFIED LATERALITY (HCC): Primary | ICD-10-CM

## 2025-02-26 DIAGNOSIS — Z72.89 OTHER PROBLEMS RELATED TO LIFESTYLE: ICD-10-CM

## 2025-02-26 DIAGNOSIS — C54.9 MIXED MULLERIAN TUMOR (HCC): ICD-10-CM

## 2025-02-26 DIAGNOSIS — Z11.59 ENCOUNTER FOR SCREENING FOR OTHER VIRAL DISEASES: ICD-10-CM

## 2025-02-26 LAB
ALBUMIN SERPL-MCNC: 3.5 G/DL (ref 3.2–4.6)
ALBUMIN/GLOB SERPL: 0.8 (ref 1–1.9)
ALP SERPL-CCNC: 129 U/L (ref 35–104)
ALT SERPL-CCNC: 14 U/L (ref 8–45)
ANION GAP SERPL CALC-SCNC: 12 MMOL/L (ref 7–16)
AST SERPL-CCNC: 20 U/L (ref 15–37)
BASOPHILS # BLD: 0.05 K/UL (ref 0–0.2)
BASOPHILS NFR BLD: 0.6 % (ref 0–2)
BILIRUB SERPL-MCNC: 0.4 MG/DL (ref 0–1.2)
BUN SERPL-MCNC: 26 MG/DL (ref 8–23)
CALCIUM SERPL-MCNC: 9.5 MG/DL (ref 8.8–10.2)
CANCER AG125 SERPL-ACNC: 588 U/ML (ref 0–38)
CHLORIDE SERPL-SCNC: 103 MMOL/L (ref 98–107)
CO2 SERPL-SCNC: 24 MMOL/L (ref 20–29)
CREAT SERPL-MCNC: 1.19 MG/DL (ref 0.6–1.1)
DIFFERENTIAL METHOD BLD: ABNORMAL
EOSINOPHIL # BLD: 0.1 K/UL (ref 0–0.8)
EOSINOPHIL NFR BLD: 1.1 % (ref 0.5–7.8)
ERYTHROCYTE [DISTWIDTH] IN BLOOD BY AUTOMATED COUNT: 15.3 % (ref 11.9–14.6)
GLOBULIN SER CALC-MCNC: 4.3 G/DL (ref 2.3–3.5)
GLUCOSE SERPL-MCNC: 114 MG/DL (ref 70–99)
HCT VFR BLD AUTO: 38.3 % (ref 35.8–46.3)
HGB BLD-MCNC: 12 G/DL (ref 11.7–15.4)
IMM GRANULOCYTES # BLD AUTO: 0.14 K/UL (ref 0–0.5)
IMM GRANULOCYTES NFR BLD AUTO: 1.6 % (ref 0–5)
LYMPHOCYTES # BLD: 2.31 K/UL (ref 0.5–4.6)
LYMPHOCYTES NFR BLD: 26.5 % (ref 13–44)
MCH RBC QN AUTO: 30.6 PG (ref 26.1–32.9)
MCHC RBC AUTO-ENTMCNC: 31.3 G/DL (ref 31.4–35)
MCV RBC AUTO: 97.7 FL (ref 82–102)
MONOCYTES # BLD: 0.97 K/UL (ref 0.1–1.3)
MONOCYTES NFR BLD: 11.1 % (ref 4–12)
NEUTS SEG # BLD: 5.14 K/UL (ref 1.7–8.2)
NEUTS SEG NFR BLD: 59.1 % (ref 43–78)
NRBC # BLD: 0 K/UL (ref 0–0.2)
PLATELET # BLD AUTO: 227 K/UL (ref 150–450)
PMV BLD AUTO: 8.8 FL (ref 9.4–12.3)
POTASSIUM SERPL-SCNC: 4.3 MMOL/L (ref 3.5–5.1)
PROT SERPL-MCNC: 7.7 G/DL (ref 6.3–8.2)
RBC # BLD AUTO: 3.92 M/UL (ref 4.05–5.2)
SODIUM SERPL-SCNC: 139 MMOL/L (ref 136–145)
WBC # BLD AUTO: 8.7 K/UL (ref 4.3–11.1)

## 2025-02-26 PROCEDURE — 96413 CHEMO IV INFUSION 1 HR: CPT

## 2025-02-26 PROCEDURE — 6360000002 HC RX W HCPCS: Performed by: NURSE PRACTITIONER

## 2025-02-26 PROCEDURE — 86304 IMMUNOASSAY TUMOR CA 125: CPT

## 2025-02-26 PROCEDURE — 1090F PRES/ABSN URINE INCON ASSESS: CPT | Performed by: NURSE PRACTITIONER

## 2025-02-26 PROCEDURE — 2580000003 HC RX 258: Performed by: NURSE PRACTITIONER

## 2025-02-26 PROCEDURE — 1159F MED LIST DOCD IN RCRD: CPT | Performed by: NURSE PRACTITIONER

## 2025-02-26 PROCEDURE — G8427 DOCREV CUR MEDS BY ELIG CLIN: HCPCS | Performed by: NURSE PRACTITIONER

## 2025-02-26 PROCEDURE — 1036F TOBACCO NON-USER: CPT | Performed by: NURSE PRACTITIONER

## 2025-02-26 PROCEDURE — 96375 TX/PRO/DX INJ NEW DRUG ADDON: CPT

## 2025-02-26 PROCEDURE — G8417 CALC BMI ABV UP PARAM F/U: HCPCS | Performed by: NURSE PRACTITIONER

## 2025-02-26 PROCEDURE — 99214 OFFICE O/P EST MOD 30 MIN: CPT | Performed by: NURSE PRACTITIONER

## 2025-02-26 PROCEDURE — 1126F AMNT PAIN NOTED NONE PRSNT: CPT | Performed by: NURSE PRACTITIONER

## 2025-02-26 PROCEDURE — 80053 COMPREHEN METABOLIC PANEL: CPT

## 2025-02-26 PROCEDURE — 2500000003 HC RX 250 WO HCPCS: Performed by: INTERNAL MEDICINE

## 2025-02-26 PROCEDURE — 96367 TX/PROPH/DG ADDL SEQ IV INF: CPT

## 2025-02-26 PROCEDURE — 85025 COMPLETE CBC W/AUTO DIFF WBC: CPT

## 2025-02-26 PROCEDURE — 2500000003 HC RX 250 WO HCPCS: Performed by: NURSE PRACTITIONER

## 2025-02-26 PROCEDURE — 96415 CHEMO IV INFUSION ADDL HR: CPT

## 2025-02-26 PROCEDURE — 1123F ACP DISCUSS/DSCN MKR DOCD: CPT | Performed by: NURSE PRACTITIONER

## 2025-02-26 PROCEDURE — 96417 CHEMO IV INFUS EACH ADDL SEQ: CPT

## 2025-02-26 RX ORDER — ALBUTEROL SULFATE 90 UG/1
4 INHALANT RESPIRATORY (INHALATION) PRN
Status: DISCONTINUED | OUTPATIENT
Start: 2025-02-26 | End: 2025-02-27 | Stop reason: HOSPADM

## 2025-02-26 RX ORDER — EPINEPHRINE 1 MG/ML
0.3 INJECTION, SOLUTION, CONCENTRATE INTRAVENOUS PRN
Status: DISCONTINUED | OUTPATIENT
Start: 2025-02-26 | End: 2025-02-27 | Stop reason: HOSPADM

## 2025-02-26 RX ORDER — SODIUM CHLORIDE 9 MG/ML
INJECTION, SOLUTION INTRAVENOUS CONTINUOUS
Status: DISCONTINUED | OUTPATIENT
Start: 2025-02-26 | End: 2025-02-27 | Stop reason: HOSPADM

## 2025-02-26 RX ORDER — FAMOTIDINE 10 MG/ML
20 INJECTION, SOLUTION INTRAVENOUS ONCE
Status: CANCELLED | OUTPATIENT
Start: 2025-02-26 | End: 2025-02-26

## 2025-02-26 RX ORDER — DIPHENHYDRAMINE HYDROCHLORIDE 50 MG/ML
25 INJECTION INTRAMUSCULAR; INTRAVENOUS ONCE
Status: COMPLETED | OUTPATIENT
Start: 2025-02-26 | End: 2025-02-26

## 2025-02-26 RX ORDER — DIPHENHYDRAMINE HYDROCHLORIDE 50 MG/ML
50 INJECTION INTRAMUSCULAR; INTRAVENOUS
Status: DISCONTINUED | OUTPATIENT
Start: 2025-02-26 | End: 2025-02-27 | Stop reason: HOSPADM

## 2025-02-26 RX ORDER — SODIUM CHLORIDE 0.9 % (FLUSH) 0.9 %
5-40 SYRINGE (ML) INJECTION PRN
Status: ACTIVE | OUTPATIENT
Start: 2025-02-26 | End: 2025-02-26

## 2025-02-26 RX ORDER — HEPARIN SODIUM (PORCINE) LOCK FLUSH IV SOLN 100 UNIT/ML 100 UNIT/ML
500 SOLUTION INTRAVENOUS PRN
Status: CANCELLED | OUTPATIENT
Start: 2025-02-26

## 2025-02-26 RX ORDER — SODIUM CHLORIDE 9 MG/ML
5-250 INJECTION, SOLUTION INTRAVENOUS PRN
Status: CANCELLED | OUTPATIENT
Start: 2025-02-26

## 2025-02-26 RX ORDER — DIPHENHYDRAMINE HYDROCHLORIDE 50 MG/ML
50 INJECTION INTRAMUSCULAR; INTRAVENOUS
Status: CANCELLED | OUTPATIENT
Start: 2025-02-26

## 2025-02-26 RX ORDER — ACETAMINOPHEN 325 MG/1
650 TABLET ORAL
Status: DISCONTINUED | OUTPATIENT
Start: 2025-02-26 | End: 2025-02-27 | Stop reason: HOSPADM

## 2025-02-26 RX ORDER — EPINEPHRINE 1 MG/ML
0.3 INJECTION, SOLUTION, CONCENTRATE INTRAVENOUS PRN
Status: CANCELLED | OUTPATIENT
Start: 2025-02-26

## 2025-02-26 RX ORDER — SODIUM CHLORIDE 0.9 % (FLUSH) 0.9 %
5-40 SYRINGE (ML) INJECTION PRN
Status: CANCELLED | OUTPATIENT
Start: 2025-02-26

## 2025-02-26 RX ORDER — ONDANSETRON 2 MG/ML
8 INJECTION INTRAMUSCULAR; INTRAVENOUS
Status: CANCELLED | OUTPATIENT
Start: 2025-02-26

## 2025-02-26 RX ORDER — PROCHLORPERAZINE EDISYLATE 5 MG/ML
5 INJECTION INTRAMUSCULAR; INTRAVENOUS
Status: DISCONTINUED | OUTPATIENT
Start: 2025-02-26 | End: 2025-02-27 | Stop reason: HOSPADM

## 2025-02-26 RX ORDER — ACETAMINOPHEN 325 MG/1
650 TABLET ORAL
Status: CANCELLED | OUTPATIENT
Start: 2025-02-26

## 2025-02-26 RX ORDER — MEPERIDINE HYDROCHLORIDE 50 MG/ML
12.5 INJECTION INTRAMUSCULAR; INTRAVENOUS; SUBCUTANEOUS PRN
Status: CANCELLED | OUTPATIENT
Start: 2025-02-26

## 2025-02-26 RX ORDER — SODIUM CHLORIDE 0.9 % (FLUSH) 0.9 %
5-40 SYRINGE (ML) INJECTION PRN
Status: DISCONTINUED | OUTPATIENT
Start: 2025-02-26 | End: 2025-02-27 | Stop reason: HOSPADM

## 2025-02-26 RX ORDER — SODIUM CHLORIDE 9 MG/ML
5-250 INJECTION, SOLUTION INTRAVENOUS PRN
Status: DISCONTINUED | OUTPATIENT
Start: 2025-02-26 | End: 2025-02-27 | Stop reason: HOSPADM

## 2025-02-26 RX ORDER — SODIUM CHLORIDE 9 MG/ML
INJECTION, SOLUTION INTRAVENOUS CONTINUOUS
Status: CANCELLED | OUTPATIENT
Start: 2025-02-26

## 2025-02-26 RX ORDER — DEXAMETHASONE SODIUM PHOSPHATE 10 MG/ML
10 INJECTION, SOLUTION INTRAMUSCULAR; INTRAVENOUS ONCE
Status: COMPLETED | OUTPATIENT
Start: 2025-02-26 | End: 2025-02-26

## 2025-02-26 RX ORDER — ONDANSETRON 2 MG/ML
8 INJECTION INTRAMUSCULAR; INTRAVENOUS ONCE
Status: COMPLETED | OUTPATIENT
Start: 2025-02-26 | End: 2025-02-26

## 2025-02-26 RX ORDER — HYDROCORTISONE SODIUM SUCCINATE 100 MG/2ML
100 INJECTION INTRAMUSCULAR; INTRAVENOUS
Status: DISCONTINUED | OUTPATIENT
Start: 2025-02-26 | End: 2025-02-27 | Stop reason: HOSPADM

## 2025-02-26 RX ORDER — ALBUTEROL SULFATE 90 UG/1
4 INHALANT RESPIRATORY (INHALATION) PRN
Status: CANCELLED | OUTPATIENT
Start: 2025-02-26

## 2025-02-26 RX ORDER — PROCHLORPERAZINE EDISYLATE 5 MG/ML
5 INJECTION INTRAMUSCULAR; INTRAVENOUS
Status: CANCELLED | OUTPATIENT
Start: 2025-02-26

## 2025-02-26 RX ORDER — ONDANSETRON 2 MG/ML
8 INJECTION INTRAMUSCULAR; INTRAVENOUS
Status: DISCONTINUED | OUTPATIENT
Start: 2025-02-26 | End: 2025-02-27 | Stop reason: HOSPADM

## 2025-02-26 RX ORDER — ONDANSETRON 2 MG/ML
8 INJECTION INTRAMUSCULAR; INTRAVENOUS ONCE
Status: CANCELLED | OUTPATIENT
Start: 2025-02-26 | End: 2025-02-26

## 2025-02-26 RX ORDER — HYDROCORTISONE SODIUM SUCCINATE 100 MG/2ML
100 INJECTION INTRAMUSCULAR; INTRAVENOUS
Status: CANCELLED | OUTPATIENT
Start: 2025-02-26

## 2025-02-26 RX ORDER — DIPHENHYDRAMINE HYDROCHLORIDE 50 MG/ML
25 INJECTION INTRAMUSCULAR; INTRAVENOUS ONCE
Status: CANCELLED | OUTPATIENT
Start: 2025-02-26 | End: 2025-02-26

## 2025-02-26 RX ORDER — FAMOTIDINE 10 MG/ML
20 INJECTION, SOLUTION INTRAVENOUS
Status: CANCELLED | OUTPATIENT
Start: 2025-02-26

## 2025-02-26 RX ORDER — MEPERIDINE HYDROCHLORIDE 25 MG/ML
12.5 INJECTION INTRAMUSCULAR; INTRAVENOUS; SUBCUTANEOUS PRN
Status: DISCONTINUED | OUTPATIENT
Start: 2025-02-26 | End: 2025-02-27 | Stop reason: HOSPADM

## 2025-02-26 RX ADMIN — SODIUM CHLORIDE, PRESERVATIVE FREE 10 ML: 5 INJECTION INTRAVENOUS at 09:05

## 2025-02-26 RX ADMIN — DEXAMETHASONE SODIUM PHOSPHATE 10 MG: 10 INJECTION, SOLUTION INTRAMUSCULAR; INTRAVENOUS at 09:48

## 2025-02-26 RX ADMIN — ONDANSETRON 8 MG: 2 INJECTION, SOLUTION INTRAMUSCULAR; INTRAVENOUS at 09:47

## 2025-02-26 RX ADMIN — SODIUM CHLORIDE 150 MG: 9 INJECTION, SOLUTION INTRAVENOUS at 09:50

## 2025-02-26 RX ADMIN — DIPHENHYDRAMINE HYDROCHLORIDE 25 MG: 50 INJECTION INTRAMUSCULAR; INTRAVENOUS at 09:44

## 2025-02-26 RX ADMIN — CARBOPLATIN 295 MG: 10 INJECTION, SOLUTION INTRAVENOUS at 13:27

## 2025-02-26 RX ADMIN — SODIUM CHLORIDE 0.26 MG: 9 INJECTION INTRAMUSCULAR; INTRAVENOUS; SUBCUTANEOUS at 12:19

## 2025-02-26 RX ADMIN — SODIUM CHLORIDE, PRESERVATIVE FREE 20 ML: 5 INJECTION INTRAVENOUS at 07:30

## 2025-02-26 RX ADMIN — SODIUM CHLORIDE, PRESERVATIVE FREE 10 ML: 5 INJECTION INTRAVENOUS at 14:00

## 2025-02-26 RX ADMIN — FAMOTIDINE 20 MG: 10 INJECTION, SOLUTION INTRAVENOUS at 09:45

## 2025-02-26 RX ADMIN — PACLITAXEL 168 MG: 6 INJECTION, SOLUTION INTRAVENOUS at 10:25

## 2025-02-26 NOTE — PROGRESS NOTES
Arrived to the Infusion Center. Taxol and carboplatin completed. Patient tolerated well.   Any issues or concerns during appointment: patient c/o restless legs after benadryl premed. Ativan 0.26mg IV given per orders from KALYN Blackwell. Patient reported alleviation from restless legs.  Patient aware of next infusion appointment on 3/19/2025 sy 10am.  Discharged ambulatory with walker.

## 2025-03-01 SDOH — ECONOMIC STABILITY: FOOD INSECURITY: WITHIN THE PAST 12 MONTHS, YOU WORRIED THAT YOUR FOOD WOULD RUN OUT BEFORE YOU GOT MONEY TO BUY MORE.: NEVER TRUE

## 2025-03-01 SDOH — ECONOMIC STABILITY: FOOD INSECURITY: WITHIN THE PAST 12 MONTHS, THE FOOD YOU BOUGHT JUST DIDN'T LAST AND YOU DIDN'T HAVE MONEY TO GET MORE.: NEVER TRUE

## 2025-03-01 SDOH — ECONOMIC STABILITY: INCOME INSECURITY: IN THE LAST 12 MONTHS, WAS THERE A TIME WHEN YOU WERE NOT ABLE TO PAY THE MORTGAGE OR RENT ON TIME?: NO

## 2025-03-03 ENCOUNTER — OFFICE VISIT (OUTPATIENT)
Dept: INTERNAL MEDICINE CLINIC | Facility: CLINIC | Age: 89
End: 2025-03-03
Payer: MEDICARE

## 2025-03-03 VITALS
OXYGEN SATURATION: 96 % | HEART RATE: 81 BPM | HEIGHT: 62 IN | WEIGHT: 183 LBS | TEMPERATURE: 97.3 F | DIASTOLIC BLOOD PRESSURE: 47 MMHG | SYSTOLIC BLOOD PRESSURE: 131 MMHG | BODY MASS INDEX: 33.68 KG/M2

## 2025-03-03 DIAGNOSIS — J44.9 CHRONIC OBSTRUCTIVE PULMONARY DISEASE, UNSPECIFIED COPD TYPE (HCC): ICD-10-CM

## 2025-03-03 DIAGNOSIS — N18.30 STAGE 3 CHRONIC KIDNEY DISEASE, UNSPECIFIED WHETHER STAGE 3A OR 3B CKD (HCC): ICD-10-CM

## 2025-03-03 DIAGNOSIS — I10 PRIMARY HYPERTENSION: Primary | ICD-10-CM

## 2025-03-03 DIAGNOSIS — E11.22 TYPE 2 DIABETES MELLITUS WITH STAGE 3B CHRONIC KIDNEY DISEASE, WITHOUT LONG-TERM CURRENT USE OF INSULIN (HCC): ICD-10-CM

## 2025-03-03 DIAGNOSIS — N18.32 TYPE 2 DIABETES MELLITUS WITH STAGE 3B CHRONIC KIDNEY DISEASE, WITHOUT LONG-TERM CURRENT USE OF INSULIN (HCC): ICD-10-CM

## 2025-03-03 DIAGNOSIS — C56.9 OVARY CANCER, UNSPECIFIED LATERALITY (HCC): ICD-10-CM

## 2025-03-03 PROCEDURE — 1159F MED LIST DOCD IN RCRD: CPT | Performed by: INTERNAL MEDICINE

## 2025-03-03 PROCEDURE — 1036F TOBACCO NON-USER: CPT | Performed by: INTERNAL MEDICINE

## 2025-03-03 PROCEDURE — G2211 COMPLEX E/M VISIT ADD ON: HCPCS | Performed by: INTERNAL MEDICINE

## 2025-03-03 PROCEDURE — 1160F RVW MEDS BY RX/DR IN RCRD: CPT | Performed by: INTERNAL MEDICINE

## 2025-03-03 PROCEDURE — 3023F SPIROM DOC REV: CPT | Performed by: INTERNAL MEDICINE

## 2025-03-03 PROCEDURE — 1090F PRES/ABSN URINE INCON ASSESS: CPT | Performed by: INTERNAL MEDICINE

## 2025-03-03 PROCEDURE — G8427 DOCREV CUR MEDS BY ELIG CLIN: HCPCS | Performed by: INTERNAL MEDICINE

## 2025-03-03 PROCEDURE — G8417 CALC BMI ABV UP PARAM F/U: HCPCS | Performed by: INTERNAL MEDICINE

## 2025-03-03 PROCEDURE — 99214 OFFICE O/P EST MOD 30 MIN: CPT | Performed by: INTERNAL MEDICINE

## 2025-03-03 PROCEDURE — 1123F ACP DISCUSS/DSCN MKR DOCD: CPT | Performed by: INTERNAL MEDICINE

## 2025-03-03 SDOH — ECONOMIC STABILITY: FOOD INSECURITY: WITHIN THE PAST 12 MONTHS, YOU WORRIED THAT YOUR FOOD WOULD RUN OUT BEFORE YOU GOT MONEY TO BUY MORE.: NEVER TRUE

## 2025-03-03 SDOH — ECONOMIC STABILITY: FOOD INSECURITY: WITHIN THE PAST 12 MONTHS, THE FOOD YOU BOUGHT JUST DIDN'T LAST AND YOU DIDN'T HAVE MONEY TO GET MORE.: NEVER TRUE

## 2025-03-03 NOTE — PROGRESS NOTES
days following chemo. She receives steroids with her treatment.       No follow-ups on file.        MD darling Thao

## 2025-03-19 ENCOUNTER — HOSPITAL ENCOUNTER (OUTPATIENT)
Dept: LAB | Age: 89
Discharge: HOME | End: 2025-03-19
Payer: MEDICARE

## 2025-03-19 ENCOUNTER — OFFICE VISIT (OUTPATIENT)
Dept: ONCOLOGY | Age: 89
End: 2025-03-19
Payer: MEDICARE

## 2025-03-19 ENCOUNTER — HOSPITAL ENCOUNTER (OUTPATIENT)
Dept: INFUSION THERAPY | Age: 89
Setting detail: INFUSION SERIES
Discharge: HOME | End: 2025-03-19
Payer: MEDICARE

## 2025-03-19 VITALS
BODY MASS INDEX: 33.86 KG/M2 | SYSTOLIC BLOOD PRESSURE: 160 MMHG | HEIGHT: 62 IN | OXYGEN SATURATION: 96 % | TEMPERATURE: 98.1 F | WEIGHT: 184 LBS | RESPIRATION RATE: 22 BRPM | HEART RATE: 77 BPM | DIASTOLIC BLOOD PRESSURE: 68 MMHG

## 2025-03-19 DIAGNOSIS — C56.9 OVARY CANCER, UNSPECIFIED LATERALITY (HCC): Primary | ICD-10-CM

## 2025-03-19 DIAGNOSIS — R97.1 ELEVATED CANCER ANTIGEN 125 (CA 125): ICD-10-CM

## 2025-03-19 DIAGNOSIS — Z72.89 OTHER PROBLEMS RELATED TO LIFESTYLE: ICD-10-CM

## 2025-03-19 DIAGNOSIS — Z11.59 ENCOUNTER FOR SCREENING FOR OTHER VIRAL DISEASES: ICD-10-CM

## 2025-03-19 DIAGNOSIS — C54.9 MIXED MULLERIAN TUMOR (HCC): ICD-10-CM

## 2025-03-19 DIAGNOSIS — C54.9 MIXED MULLERIAN TUMOR (HCC): Primary | ICD-10-CM

## 2025-03-19 LAB
ALBUMIN SERPL-MCNC: 3.6 G/DL (ref 3.2–4.6)
ALBUMIN/GLOB SERPL: 0.9 (ref 1–1.9)
ALP SERPL-CCNC: 127 U/L (ref 35–104)
ALT SERPL-CCNC: 15 U/L (ref 8–45)
ANION GAP SERPL CALC-SCNC: 15 MMOL/L (ref 7–16)
AST SERPL-CCNC: 23 U/L (ref 15–37)
BASOPHILS # BLD: 0.04 K/UL (ref 0–0.2)
BASOPHILS NFR BLD: 0.4 % (ref 0–2)
BILIRUB SERPL-MCNC: 0.5 MG/DL (ref 0–1.2)
BUN SERPL-MCNC: 20 MG/DL (ref 8–23)
CALCIUM SERPL-MCNC: 10.1 MG/DL (ref 8.8–10.2)
CANCER AG125 SERPL-ACNC: 629 U/ML (ref 0–38)
CHLORIDE SERPL-SCNC: 100 MMOL/L (ref 98–107)
CO2 SERPL-SCNC: 25 MMOL/L (ref 20–29)
CREAT SERPL-MCNC: 1.1 MG/DL (ref 0.6–1.1)
DIFFERENTIAL METHOD BLD: ABNORMAL
EOSINOPHIL # BLD: 0.12 K/UL (ref 0–0.8)
EOSINOPHIL NFR BLD: 1.3 % (ref 0.5–7.8)
ERYTHROCYTE [DISTWIDTH] IN BLOOD BY AUTOMATED COUNT: 15.9 % (ref 11.9–14.6)
GLOBULIN SER CALC-MCNC: 4.1 G/DL (ref 2.3–3.5)
GLUCOSE SERPL-MCNC: 92 MG/DL (ref 70–99)
HCT VFR BLD AUTO: 39.6 % (ref 35.8–46.3)
HGB BLD-MCNC: 12.5 G/DL (ref 11.7–15.4)
IMM GRANULOCYTES # BLD AUTO: 0.12 K/UL (ref 0–0.5)
IMM GRANULOCYTES NFR BLD AUTO: 1.3 % (ref 0–5)
LYMPHOCYTES # BLD: 2.1 K/UL (ref 0.5–4.6)
LYMPHOCYTES NFR BLD: 23.3 % (ref 13–44)
MCH RBC QN AUTO: 30.6 PG (ref 26.1–32.9)
MCHC RBC AUTO-ENTMCNC: 31.6 G/DL (ref 31.4–35)
MCV RBC AUTO: 97.1 FL (ref 82–102)
MONOCYTES # BLD: 0.98 K/UL (ref 0.1–1.3)
MONOCYTES NFR BLD: 10.9 % (ref 4–12)
NEUTS SEG # BLD: 5.66 K/UL (ref 1.7–8.2)
NEUTS SEG NFR BLD: 62.8 % (ref 43–78)
NRBC # BLD: 0 K/UL (ref 0–0.2)
PLATELET # BLD AUTO: 286 K/UL (ref 150–450)
PMV BLD AUTO: 8.8 FL (ref 9.4–12.3)
POTASSIUM SERPL-SCNC: 4.2 MMOL/L (ref 3.5–5.1)
PROT SERPL-MCNC: 7.7 G/DL (ref 6.3–8.2)
RBC # BLD AUTO: 4.08 M/UL (ref 4.05–5.2)
SODIUM SERPL-SCNC: 140 MMOL/L (ref 136–145)
WBC # BLD AUTO: 9 K/UL (ref 4.3–11.1)

## 2025-03-19 PROCEDURE — 96415 CHEMO IV INFUSION ADDL HR: CPT

## 2025-03-19 PROCEDURE — 2580000003 HC RX 258: Performed by: INTERNAL MEDICINE

## 2025-03-19 PROCEDURE — 1160F RVW MEDS BY RX/DR IN RCRD: CPT | Performed by: INTERNAL MEDICINE

## 2025-03-19 PROCEDURE — 80053 COMPREHEN METABOLIC PANEL: CPT

## 2025-03-19 PROCEDURE — 1126F AMNT PAIN NOTED NONE PRSNT: CPT | Performed by: INTERNAL MEDICINE

## 2025-03-19 PROCEDURE — 1159F MED LIST DOCD IN RCRD: CPT | Performed by: INTERNAL MEDICINE

## 2025-03-19 PROCEDURE — 96367 TX/PROPH/DG ADDL SEQ IV INF: CPT

## 2025-03-19 PROCEDURE — 2500000003 HC RX 250 WO HCPCS: Performed by: INTERNAL MEDICINE

## 2025-03-19 PROCEDURE — G8417 CALC BMI ABV UP PARAM F/U: HCPCS | Performed by: INTERNAL MEDICINE

## 2025-03-19 PROCEDURE — 1090F PRES/ABSN URINE INCON ASSESS: CPT | Performed by: INTERNAL MEDICINE

## 2025-03-19 PROCEDURE — 6360000002 HC RX W HCPCS: Performed by: INTERNAL MEDICINE

## 2025-03-19 PROCEDURE — 99214 OFFICE O/P EST MOD 30 MIN: CPT | Performed by: INTERNAL MEDICINE

## 2025-03-19 PROCEDURE — 96417 CHEMO IV INFUS EACH ADDL SEQ: CPT

## 2025-03-19 PROCEDURE — 96375 TX/PRO/DX INJ NEW DRUG ADDON: CPT

## 2025-03-19 PROCEDURE — 96413 CHEMO IV INFUSION 1 HR: CPT

## 2025-03-19 PROCEDURE — 85025 COMPLETE CBC W/AUTO DIFF WBC: CPT

## 2025-03-19 PROCEDURE — 1123F ACP DISCUSS/DSCN MKR DOCD: CPT | Performed by: INTERNAL MEDICINE

## 2025-03-19 PROCEDURE — G8427 DOCREV CUR MEDS BY ELIG CLIN: HCPCS | Performed by: INTERNAL MEDICINE

## 2025-03-19 PROCEDURE — 86304 IMMUNOASSAY TUMOR CA 125: CPT

## 2025-03-19 PROCEDURE — 1036F TOBACCO NON-USER: CPT | Performed by: INTERNAL MEDICINE

## 2025-03-19 RX ORDER — EPINEPHRINE 1 MG/ML
0.3 INJECTION, SOLUTION, CONCENTRATE INTRAVENOUS PRN
Status: DISCONTINUED | OUTPATIENT
Start: 2025-03-19 | End: 2025-03-20 | Stop reason: HOSPADM

## 2025-03-19 RX ORDER — DIPHENHYDRAMINE HYDROCHLORIDE 50 MG/ML
25 INJECTION, SOLUTION INTRAMUSCULAR; INTRAVENOUS ONCE
Status: COMPLETED | OUTPATIENT
Start: 2025-03-19 | End: 2025-03-19

## 2025-03-19 RX ORDER — SODIUM CHLORIDE 0.9 % (FLUSH) 0.9 %
10 SYRINGE (ML) INJECTION PRN
Status: DISCONTINUED | OUTPATIENT
Start: 2025-03-19 | End: 2025-03-20 | Stop reason: HOSPADM

## 2025-03-19 RX ORDER — ONDANSETRON 2 MG/ML
8 INJECTION INTRAMUSCULAR; INTRAVENOUS
Status: CANCELLED | OUTPATIENT
Start: 2025-03-19

## 2025-03-19 RX ORDER — HYDROCORTISONE SODIUM SUCCINATE 100 MG/2ML
100 INJECTION INTRAMUSCULAR; INTRAVENOUS
Status: DISCONTINUED | OUTPATIENT
Start: 2025-03-19 | End: 2025-03-20 | Stop reason: HOSPADM

## 2025-03-19 RX ORDER — SODIUM CHLORIDE 0.9 % (FLUSH) 0.9 %
5-40 SYRINGE (ML) INJECTION PRN
Status: CANCELLED | OUTPATIENT
Start: 2025-03-19

## 2025-03-19 RX ORDER — FAMOTIDINE 10 MG/ML
20 INJECTION, SOLUTION INTRAVENOUS ONCE
Status: CANCELLED | OUTPATIENT
Start: 2025-03-19 | End: 2025-03-19

## 2025-03-19 RX ORDER — PROCHLORPERAZINE EDISYLATE 5 MG/ML
5 INJECTION INTRAMUSCULAR; INTRAVENOUS
Status: CANCELLED | OUTPATIENT
Start: 2025-03-19

## 2025-03-19 RX ORDER — DEXAMETHASONE SODIUM PHOSPHATE 10 MG/ML
10 INJECTION, SOLUTION INTRA-ARTICULAR; INTRALESIONAL; INTRAMUSCULAR; INTRAVENOUS; SOFT TISSUE ONCE
Status: COMPLETED | OUTPATIENT
Start: 2025-03-19 | End: 2025-03-19

## 2025-03-19 RX ORDER — DIPHENHYDRAMINE HYDROCHLORIDE 50 MG/ML
50 INJECTION, SOLUTION INTRAMUSCULAR; INTRAVENOUS
Status: DISCONTINUED | OUTPATIENT
Start: 2025-03-19 | End: 2025-03-20 | Stop reason: HOSPADM

## 2025-03-19 RX ORDER — ONDANSETRON 2 MG/ML
8 INJECTION INTRAMUSCULAR; INTRAVENOUS ONCE
Status: CANCELLED | OUTPATIENT
Start: 2025-03-19 | End: 2025-03-19

## 2025-03-19 RX ORDER — DIPHENHYDRAMINE HYDROCHLORIDE 50 MG/ML
25 INJECTION, SOLUTION INTRAMUSCULAR; INTRAVENOUS ONCE
Status: CANCELLED | OUTPATIENT
Start: 2025-03-19 | End: 2025-03-19

## 2025-03-19 RX ORDER — ONDANSETRON 2 MG/ML
8 INJECTION INTRAMUSCULAR; INTRAVENOUS ONCE
Status: COMPLETED | OUTPATIENT
Start: 2025-03-19 | End: 2025-03-19

## 2025-03-19 RX ORDER — ACETAMINOPHEN 325 MG/1
650 TABLET ORAL
Status: CANCELLED | OUTPATIENT
Start: 2025-03-19

## 2025-03-19 RX ORDER — ACETAMINOPHEN 325 MG/1
650 TABLET ORAL
Status: DISCONTINUED | OUTPATIENT
Start: 2025-03-19 | End: 2025-03-20 | Stop reason: HOSPADM

## 2025-03-19 RX ORDER — HYDROCORTISONE SODIUM SUCCINATE 100 MG/2ML
100 INJECTION INTRAMUSCULAR; INTRAVENOUS
Status: CANCELLED | OUTPATIENT
Start: 2025-03-19

## 2025-03-19 RX ORDER — MEPERIDINE HYDROCHLORIDE 50 MG/ML
12.5 INJECTION INTRAMUSCULAR; INTRAVENOUS; SUBCUTANEOUS PRN
Status: CANCELLED | OUTPATIENT
Start: 2025-03-19

## 2025-03-19 RX ORDER — DIPHENHYDRAMINE HYDROCHLORIDE 50 MG/ML
50 INJECTION, SOLUTION INTRAMUSCULAR; INTRAVENOUS
Status: CANCELLED | OUTPATIENT
Start: 2025-03-19

## 2025-03-19 RX ORDER — ALBUTEROL SULFATE 90 UG/1
4 INHALANT RESPIRATORY (INHALATION) PRN
Status: CANCELLED | OUTPATIENT
Start: 2025-03-19

## 2025-03-19 RX ORDER — SODIUM CHLORIDE 9 MG/ML
INJECTION, SOLUTION INTRAVENOUS CONTINUOUS
Status: CANCELLED | OUTPATIENT
Start: 2025-03-19

## 2025-03-19 RX ORDER — MEPERIDINE HYDROCHLORIDE 25 MG/ML
12.5 INJECTION INTRAMUSCULAR; INTRAVENOUS; SUBCUTANEOUS PRN
Status: DISCONTINUED | OUTPATIENT
Start: 2025-03-19 | End: 2025-03-20 | Stop reason: HOSPADM

## 2025-03-19 RX ORDER — ALBUTEROL SULFATE 90 UG/1
4 INHALANT RESPIRATORY (INHALATION) PRN
Status: DISCONTINUED | OUTPATIENT
Start: 2025-03-19 | End: 2025-03-20 | Stop reason: HOSPADM

## 2025-03-19 RX ORDER — HEPARIN SODIUM (PORCINE) LOCK FLUSH IV SOLN 100 UNIT/ML 100 UNIT/ML
500 SOLUTION INTRAVENOUS PRN
Status: CANCELLED | OUTPATIENT
Start: 2025-03-19

## 2025-03-19 RX ORDER — EPINEPHRINE 1 MG/ML
0.3 INJECTION, SOLUTION, CONCENTRATE INTRAVENOUS PRN
Status: CANCELLED | OUTPATIENT
Start: 2025-03-19

## 2025-03-19 RX ORDER — SODIUM CHLORIDE 9 MG/ML
5-250 INJECTION, SOLUTION INTRAVENOUS PRN
Status: DISCONTINUED | OUTPATIENT
Start: 2025-03-19 | End: 2025-03-20 | Stop reason: HOSPADM

## 2025-03-19 RX ORDER — ONDANSETRON 2 MG/ML
8 INJECTION INTRAMUSCULAR; INTRAVENOUS
Status: DISCONTINUED | OUTPATIENT
Start: 2025-03-19 | End: 2025-03-20 | Stop reason: HOSPADM

## 2025-03-19 RX ORDER — SODIUM CHLORIDE 0.9 % (FLUSH) 0.9 %
5-40 SYRINGE (ML) INJECTION PRN
Status: DISCONTINUED | OUTPATIENT
Start: 2025-03-19 | End: 2025-03-20 | Stop reason: HOSPADM

## 2025-03-19 RX ORDER — SODIUM CHLORIDE 9 MG/ML
5-250 INJECTION, SOLUTION INTRAVENOUS PRN
Status: CANCELLED | OUTPATIENT
Start: 2025-03-19

## 2025-03-19 RX ORDER — FAMOTIDINE 10 MG/ML
20 INJECTION, SOLUTION INTRAVENOUS
Status: CANCELLED | OUTPATIENT
Start: 2025-03-19

## 2025-03-19 RX ADMIN — SODIUM CHLORIDE, PRESERVATIVE FREE 10 ML: 5 INJECTION INTRAVENOUS at 07:30

## 2025-03-19 RX ADMIN — ONDANSETRON 8 MG: 2 INJECTION, SOLUTION INTRAMUSCULAR; INTRAVENOUS at 09:51

## 2025-03-19 RX ADMIN — SODIUM CHLORIDE 150 MG: 9 INJECTION, SOLUTION INTRAVENOUS at 10:10

## 2025-03-19 RX ADMIN — DEXAMETHASONE SODIUM PHOSPHATE 10 MG: 10 INJECTION INTRAMUSCULAR; INTRAVENOUS at 09:56

## 2025-03-19 RX ADMIN — LORAZEPAM 0.26 MG: 2 INJECTION INTRAMUSCULAR; INTRAVENOUS at 09:54

## 2025-03-19 RX ADMIN — DIPHENHYDRAMINE HYDROCHLORIDE 25 MG: 50 INJECTION INTRAMUSCULAR; INTRAVENOUS at 09:55

## 2025-03-19 RX ADMIN — SODIUM CHLORIDE 50 ML/HR: 9 INJECTION, SOLUTION INTRAVENOUS at 09:44

## 2025-03-19 RX ADMIN — SODIUM CHLORIDE, PRESERVATIVE FREE 20 MG: 5 INJECTION INTRAVENOUS at 09:52

## 2025-03-19 RX ADMIN — SODIUM CHLORIDE, PRESERVATIVE FREE 10 ML: 5 INJECTION INTRAVENOUS at 14:30

## 2025-03-19 RX ADMIN — CARBOPLATIN 305 MG: 10 INJECTION, SOLUTION INTRAVENOUS at 13:59

## 2025-03-19 RX ADMIN — PACLITAXEL 168 MG: 6 INJECTION, SOLUTION INTRAVENOUS at 10:56

## 2025-03-19 ASSESSMENT — PATIENT HEALTH QUESTIONNAIRE - PHQ9
SUM OF ALL RESPONSES TO PHQ QUESTIONS 1-9: 0
1. LITTLE INTEREST OR PLEASURE IN DOING THINGS: NOT AT ALL
SUM OF ALL RESPONSES TO PHQ QUESTIONS 1-9: 0
2. FEELING DOWN, DEPRESSED OR HOPELESS: NOT AT ALL

## 2025-03-19 NOTE — PROGRESS NOTES
Patient to port lab for port access and lab draw. Port accessed using 20g 0.75\" griffiths needle without difficulty. Labs drawn from port and port flushed. Port remains accessed. Patient tolerated well. Discharged ambulatory.

## 2025-03-19 NOTE — PROGRESS NOTES
Hunger Vital Sign     Worried About Running Out of Food in the Last Year: Never true     Ran Out of Food in the Last Year: Never true   Transportation Needs: No Transportation Needs (3/3/2025)    PRAPARE - Transportation     Lack of Transportation (Medical): No     Lack of Transportation (Non-Medical): No   Physical Activity: Insufficiently Active (10/25/2024)    Exercise Vital Sign     Days of Exercise per Week: 5 days     Minutes of Exercise per Session: 20 min   Stress: No Stress Concern Present (1/17/2023)    [employer] [place] [employer] - Occupational Stress Questionnaire     Feeling of Stress : Not at all   Social Connections: Unknown (8/16/2023)    Received from [employer]    Social Connections     Frequency of Communication with Friends and Family: Not asked     Frequency of Social Gatherings with Friends and Family: Not asked   Intimate Partner Violence: Unknown (8/16/2023)    Received from [employer]    Intimate Partner Violence     Fear of Current or Ex-Partner: Not asked     Emotionally Abused: Not asked     Physically Abused: Not asked     Sexually Abused: Not asked   Housing Stability: Low Risk  (3/3/2025)    Housing Stability Vital Sign     Unable to Pay for Housing in the Last Year: No     Number of Times Moved in the Last Year: 0     Homeless in the Last Year: No     Current Outpatient Medications   Medication Sig Dispense Refill    prochlorperazine (COMPAZINE) 5 MG tablet Take 1 tablet by mouth every 6 hours as needed for Nausea 60 tablet 1    ondansetron (ZOFRAN) 8 MG tablet Take 1 tablet by mouth every 8 hours as needed for Nausea or Vomiting 30 tablet 2    triamcinolone (ARISTOCORT) 0.5 % cream Apply topically 2 times daily Apply topically 3 times daily. 60 g 1    vitamin D (ERGOCALCIFEROL) 1.25 MG (45163 UT) CAPS capsule Take 1 capsule by mouth once a week 4 capsule 0    Cholecalciferol (VITAMIN D3) 50 MCG (2000 UT) CAPS Take by mouth

## 2025-03-19 NOTE — PROGRESS NOTES
Pt arrived ambulatory. Premeds, Taxol and Carbo infused without complications. Pt has no future infusion appts scheduled, but she is aware of next OV 4/9.  Provider will discuss further infusion need at that appt.  Pt discharged ambulatory, no distress noted.  Patient instructed to call provider with temperature of 100.4 or greater or nausea/vomiting/ diarrhea or pain not controlled by medications

## 2025-03-19 NOTE — PATIENT INSTRUCTIONS
manufacturers/methods.                   Please refer to After Visit Summary or [employer] for upcoming appointment information. If you have any questions regarding your upcoming schedule please reach out to your care team through [employer] or call [phone].    Please notify your assigned Nurse Navigator of any unplanned hospital admissions or Emergency Room visits within 24 hours of discharge.    -------------------------------------------------------------------------------------------------------------------  Please call our office at [phone] if you have any  of the following symptoms:   Fever of 100.5 or greater  Chills  Shortness of breath  Swelling or pain in one leg    After office hours an answering service is available and will contact a provider for emergencies or if you are experiencing any of the above symptoms.        GERSON OHARA RN

## 2025-03-31 ENCOUNTER — HOSPITAL ENCOUNTER (OUTPATIENT)
Dept: CT IMAGING | Age: 89
Discharge: HOME | End: 2025-04-02
Attending: INTERNAL MEDICINE
Payer: MEDICARE

## 2025-03-31 DIAGNOSIS — R97.1 ELEVATED CANCER ANTIGEN 125 (CA 125): ICD-10-CM

## 2025-03-31 DIAGNOSIS — C56.9 OVARY CANCER, UNSPECIFIED LATERALITY (HCC): ICD-10-CM

## 2025-03-31 PROCEDURE — 74177 CT ABD & PELVIS W/CONTRAST: CPT

## 2025-03-31 PROCEDURE — 6360000004 HC RX CONTRAST MEDICATION: Performed by: INTERNAL MEDICINE

## 2025-03-31 RX ORDER — DIATRIZOATE MEGLUMINE AND DIATRIZOATE SODIUM 660; 100 MG/ML; MG/ML
15 SOLUTION ORAL; RECTAL
Status: DISCONTINUED | OUTPATIENT
Start: 2025-03-31 | End: 2025-04-03 | Stop reason: HOSPADM

## 2025-03-31 RX ORDER — IOPAMIDOL 755 MG/ML
100 INJECTION, SOLUTION INTRAVASCULAR
Status: COMPLETED | OUTPATIENT
Start: 2025-03-31 | End: 2025-03-31

## 2025-03-31 RX ADMIN — DIATRIZOATE MEGLUMINE AND DIATRIZOATE SODIUM 15 ML: 660; 100 LIQUID ORAL; RECTAL at 14:26

## 2025-03-31 RX ADMIN — IOPAMIDOL 100 ML: 755 INJECTION, SOLUTION INTRAVENOUS at 14:22

## 2025-04-09 ENCOUNTER — OFFICE VISIT (OUTPATIENT)
Dept: ONCOLOGY | Age: 89
End: 2025-04-09
Payer: MEDICARE

## 2025-04-09 ENCOUNTER — TELEPHONE (OUTPATIENT)
Dept: ONCOLOGY | Age: 89
End: 2025-04-09

## 2025-04-09 ENCOUNTER — HOSPITAL ENCOUNTER (OUTPATIENT)
Dept: LAB | Age: 89
Discharge: HOME | End: 2025-04-09
Payer: MEDICARE

## 2025-04-09 VITALS
BODY MASS INDEX: 33.93 KG/M2 | HEART RATE: 85 BPM | SYSTOLIC BLOOD PRESSURE: 153 MMHG | RESPIRATION RATE: 16 BRPM | TEMPERATURE: 97.5 F | HEIGHT: 62 IN | DIASTOLIC BLOOD PRESSURE: 74 MMHG | OXYGEN SATURATION: 100 % | WEIGHT: 184.4 LBS

## 2025-04-09 DIAGNOSIS — R97.1 ELEVATED CANCER ANTIGEN 125 (CA 125): ICD-10-CM

## 2025-04-09 DIAGNOSIS — C56.9 OVARY CANCER, UNSPECIFIED LATERALITY (HCC): Primary | ICD-10-CM

## 2025-04-09 DIAGNOSIS — C56.9 OVARY CANCER, UNSPECIFIED LATERALITY (HCC): ICD-10-CM

## 2025-04-09 LAB
ALBUMIN SERPL-MCNC: 3.8 G/DL (ref 3.2–4.6)
ALBUMIN/GLOB SERPL: 0.9 (ref 1–1.9)
ALP SERPL-CCNC: 137 U/L (ref 35–104)
ALT SERPL-CCNC: 16 U/L (ref 8–45)
ANION GAP SERPL CALC-SCNC: 11 MMOL/L (ref 7–16)
AST SERPL-CCNC: 21 U/L (ref 15–37)
BASOPHILS # BLD: 0.07 K/UL (ref 0–0.2)
BASOPHILS NFR BLD: 0.8 % (ref 0–2)
BILIRUB SERPL-MCNC: 0.4 MG/DL (ref 0–1.2)
BUN SERPL-MCNC: 21 MG/DL (ref 8–23)
CALCIUM SERPL-MCNC: 10.6 MG/DL (ref 8.8–10.2)
CANCER AG125 SERPL-ACNC: 794 U/ML (ref 0–38)
CHLORIDE SERPL-SCNC: 100 MMOL/L (ref 98–107)
CO2 SERPL-SCNC: 26 MMOL/L (ref 20–29)
CREAT SERPL-MCNC: 1.14 MG/DL (ref 0.6–1.1)
DIFFERENTIAL METHOD BLD: ABNORMAL
EOSINOPHIL # BLD: 0.08 K/UL (ref 0–0.8)
EOSINOPHIL NFR BLD: 0.9 % (ref 0.5–7.8)
ERYTHROCYTE [DISTWIDTH] IN BLOOD BY AUTOMATED COUNT: 15.9 % (ref 11.9–14.6)
GLOBULIN SER CALC-MCNC: 4.4 G/DL (ref 2.3–3.5)
GLUCOSE SERPL-MCNC: 94 MG/DL (ref 70–99)
HCT VFR BLD AUTO: 41.7 % (ref 35.8–46.3)
HGB BLD-MCNC: 13.1 G/DL (ref 11.7–15.4)
IMM GRANULOCYTES # BLD AUTO: 0.1 K/UL (ref 0–0.5)
IMM GRANULOCYTES NFR BLD AUTO: 1.1 % (ref 0–5)
LYMPHOCYTES # BLD: 2.23 K/UL (ref 0.5–4.6)
LYMPHOCYTES NFR BLD: 25.5 % (ref 13–44)
MCH RBC QN AUTO: 30.7 PG (ref 26.1–32.9)
MCHC RBC AUTO-ENTMCNC: 31.4 G/DL (ref 31.4–35)
MCV RBC AUTO: 97.7 FL (ref 82–102)
MONOCYTES # BLD: 1.03 K/UL (ref 0.1–1.3)
MONOCYTES NFR BLD: 11.8 % (ref 4–12)
NEUTS SEG # BLD: 5.24 K/UL (ref 1.7–8.2)
NEUTS SEG NFR BLD: 59.9 % (ref 43–78)
NRBC # BLD: 0 K/UL (ref 0–0.2)
PLATELET # BLD AUTO: 259 K/UL (ref 150–450)
PMV BLD AUTO: 9 FL (ref 9.4–12.3)
POTASSIUM SERPL-SCNC: 4.3 MMOL/L (ref 3.5–5.1)
PROT SERPL-MCNC: 8.2 G/DL (ref 6.3–8.2)
RBC # BLD AUTO: 4.27 M/UL (ref 4.05–5.2)
SODIUM SERPL-SCNC: 137 MMOL/L (ref 136–145)
WBC # BLD AUTO: 8.8 K/UL (ref 4.3–11.1)

## 2025-04-09 PROCEDURE — 85025 COMPLETE CBC W/AUTO DIFF WBC: CPT

## 2025-04-09 PROCEDURE — 1125F AMNT PAIN NOTED PAIN PRSNT: CPT | Performed by: INTERNAL MEDICINE

## 2025-04-09 PROCEDURE — 86304 IMMUNOASSAY TUMOR CA 125: CPT

## 2025-04-09 PROCEDURE — 36415 COLL VENOUS BLD VENIPUNCTURE: CPT

## 2025-04-09 PROCEDURE — 1123F ACP DISCUSS/DSCN MKR DOCD: CPT | Performed by: INTERNAL MEDICINE

## 2025-04-09 PROCEDURE — 1036F TOBACCO NON-USER: CPT | Performed by: INTERNAL MEDICINE

## 2025-04-09 PROCEDURE — 1090F PRES/ABSN URINE INCON ASSESS: CPT | Performed by: INTERNAL MEDICINE

## 2025-04-09 PROCEDURE — G8417 CALC BMI ABV UP PARAM F/U: HCPCS | Performed by: INTERNAL MEDICINE

## 2025-04-09 PROCEDURE — G8428 CUR MEDS NOT DOCUMENT: HCPCS | Performed by: INTERNAL MEDICINE

## 2025-04-09 PROCEDURE — 80053 COMPREHEN METABOLIC PANEL: CPT

## 2025-04-09 PROCEDURE — 99214 OFFICE O/P EST MOD 30 MIN: CPT | Performed by: INTERNAL MEDICINE

## 2025-04-09 RX ORDER — SODIUM CHLORIDE 0.9 % (FLUSH) 0.9 %
5-40 SYRINGE (ML) INJECTION PRN
Status: DISCONTINUED | OUTPATIENT
Start: 2025-04-09 | End: 2025-04-10 | Stop reason: HOSPADM

## 2025-04-09 ASSESSMENT — PATIENT HEALTH QUESTIONNAIRE - PHQ9
1. LITTLE INTEREST OR PLEASURE IN DOING THINGS: NOT AT ALL
SUM OF ALL RESPONSES TO PHQ QUESTIONS 1-9: 0
SUM OF ALL RESPONSES TO PHQ QUESTIONS 1-9: 0
2. FEELING DOWN, DEPRESSED OR HOPELESS: NOT AT ALL
SUM OF ALL RESPONSES TO PHQ QUESTIONS 1-9: 0
SUM OF ALL RESPONSES TO PHQ QUESTIONS 1-9: 0

## 2025-04-09 NOTE — TELEPHONE ENCOUNTER
Message to Dr. Villalba to let him know that patient has decided that she would like to go ahead and start treatment

## 2025-04-09 NOTE — PATIENT INSTRUCTIONS
Differential Type 04/09/2025 AUTOMATED    Final                 Please refer to After Visit Summary or [employer] for upcoming appointment information. If you have any questions regarding your upcoming schedule please reach out to your care team through [employer] or call [phone].    Please notify your assigned Nurse Navigator of any unplanned hospital admissions or Emergency Room visits within 24 hours of discharge.    -------------------------------------------------------------------------------------------------------------------  Please call our office at [phone] if you have any  of the following symptoms:   Fever of 100.5 or greater  Chills  Shortness of breath  Swelling or pain in one leg    After office hours an answering service is available and will contact a provider for emergencies or if you are experiencing any of the above symptoms.        GERSON OHARA RN

## 2025-04-09 NOTE — TELEPHONE ENCOUNTER
Physician provider: Dr. Villalba  Reason for today's call (Please detail here patients chief complaint): Pt was told to think about cancer tx pill, and she has decided she wants to go ahead and start tx.  Last office visit:n/a  Patient Callback Number: [phone] for Child or  [phone] for pt  Was callback number verified?: Yes  Preferred pharmacy (If refill request): [facility] in [place]  Veriified that patient confirmed no refills left at pharmacy? :No  Calls to office within the last 48 hours?:No    Warm Transfer to (For [employer] Words): n/a    Patient notified that their information will be routed to the [facility] clinical triage team for review. Patient is advised that they will receive a phone call from the triage department. If symptom related and symptoms worsen before receiving a call back, the patient has been advised to proceed to the nearest ED.

## 2025-04-10 DIAGNOSIS — C56.9 OVARY CANCER, UNSPECIFIED LATERALITY (HCC): Primary | ICD-10-CM

## 2025-04-10 DIAGNOSIS — C54.9 MALIGNANT NEOPLASM OF CORPUS UTERI, UNSPECIFIED: ICD-10-CM

## 2025-04-10 DIAGNOSIS — R97.1 ELEVATED CANCER ANTIGEN 125 (CA 125): ICD-10-CM

## 2025-04-10 DIAGNOSIS — C54.9 MIXED MULLERIAN TUMOR (HCC): ICD-10-CM

## 2025-04-10 NOTE — PROGRESS NOTES
Caris testing ordered on specimen [id number] from 4/28/2022 per Dr. Villalba's request. Recent OV note and pathology report routed to [facility].

## 2025-04-10 NOTE — TELEPHONE ENCOUNTER
RN returned call to Drew, patient's son, to let him know that Dr. Villalba has ordered the Lynparza medication. Patient has to take oral chemotherapy prior and is aware of how to take and safe handling instructions. RN let Drew know that patient can start Lynparza as soon as it arrives and we will schedule a follow up lab/office visit in about 5 weeks, hopefully this will be 3-4 weeks after patient starts the Lynparza. If Lynparza is delayed getting to patient, we can always move the office visit out, patient to let us know. Scheduling notified. Drew MEDRANO, no further questions for RN.

## 2025-04-15 ENCOUNTER — TELEPHONE (OUTPATIENT)
Dept: ONCOLOGY | Age: 89
End: 2025-04-15

## 2025-04-15 NOTE — TELEPHONE ENCOUNTER
Physician provider: Dr. Villalba  Reason for today's call (Please detail here patients chief complaint): Delaying start of medication due to upper respiratory issues  Last office visit:na  Patient Callback Number: [phone]  Was callback number verified?: Yes  Preferred pharmacy (If refill request): na  Veriified that patient confirmed no refills left at pharmacy? :No  Calls to office within the last 48 hours?:No    Warm Transfer to (For Red Words): na    Patient notified that their information will be routed to the [facility] clinical triage team for review. Patient is advised that they will receive a phone call from the triage department. If symptom related and symptoms worsen before receiving a call back, the patient has been advised to proceed to the nearest ED.    Pt's son Darwin states pt is having sinus/upper respiratory issues; oxygen levels dropping whenever she isn't wearing her oxygen. pt's son is worried that it's getting into her chest and he states he is taking her to Urgent Care tomorrow & wanted to let us know that they are holding off on starting Lynparza until they find out what's going on.

## 2025-04-16 NOTE — TELEPHONE ENCOUNTER
RN returned call to patient's son, Darwin. Darwin states patient has chest congestions and shortness of breath. RN let Darwin know that patient should present to urgent care for further evaluation. Patient to hold off on starting Lynparza until she is feeling better. Darwin MARCELA, states he will leave work and take her to urgent care. Patient/Darwin will message us when she is feeling better prior to starting Lynparza. No further questions for RN.

## 2025-04-22 ENCOUNTER — TELEPHONE (OUTPATIENT)
Dept: INTERNAL MEDICINE CLINIC | Facility: CLINIC | Age: 89
End: 2025-04-22

## 2025-04-22 NOTE — TELEPHONE ENCOUNTER
Care Transitions Initial Follow Up Call    Outreach made within 2 business days of discharge: Yes    Patient: Patricia Mayen Patient : 1935   MRN: [id number]  Reason for Admission: Pneumonia   Discharge Date:  2025     Spoke with: Son    Discharge department/facility: [facility] TCM Interactive Patient Contact:  Was patient able to fill all prescriptions: No new medications prescriptions   Was patient instructed to bring all medications to the follow-up visit: Yes  Is patient taking all medications as directed in the discharge summary? Yes  Does patient understand their discharge instructions: Yes  Does patient have questions or concerns that need addressed prior to 7-14 day follow up office visit: no    Additional needs identified to be addressed with provider  No needs identified             Scheduled appointment with PCP within 7-14 days    Follow Up  Future Appointments   Date Time Provider Department Center   2025  2:40 PM Alaina Ladd APRN - NP [facility] DEP   2025  2:50 PM [facility]   2025  3:45 PM Robert Villalba MD [facility] AMB   2025  3:00 PM Rosa Delacruz MD [facility] DEP       EVE SANTOS MA

## 2025-04-22 NOTE — TELEPHONE ENCOUNTER
D/C DATE: 04/22    D/C FROM: [facility]    D/C TO: home    PHONE NUMBER TO REACH PATIENT: [phone]    F/U APPT DATE & TIME: 04/29 @2:40 pm w/Alaina          Reason for Stay: pneumonia

## 2025-04-23 ENCOUNTER — TELEPHONE (OUTPATIENT)
Dept: INTERNAL MEDICINE CLINIC | Facility: CLINIC | Age: 89
End: 2025-04-23

## 2025-04-23 NOTE — TELEPHONE ENCOUNTER
D/C DATE: 4/22/2025    D/C FROM: [facility]     D/C TO: home     PHONE NUMBER TO REACH PATIENT: [phone]    F/U APPT DATE & TIME: 4/29/2025 2:40

## 2025-04-28 ENCOUNTER — TELEPHONE (OUTPATIENT)
Dept: ONCOLOGY | Age: 89
End: 2025-04-28

## 2025-04-28 NOTE — TELEPHONE ENCOUNTER
Physician provider: Dr. Villalba  Reason for today's call (Please detail here patients chief complaint): Pt's son Drew called to speak with Elías. Pt was supposed to start lynparza but ended up being admitted to hospital due to pneumonia. Pt has been discharged and was told to reach out to Elías upon discharge to discuss continuing lynparza.  Last office visit:n/a  Patient Callback Number: [phone]   Was callback number verified?: Yes  Preferred pharmacy (If refill request): n/a  Veriified that patient confirmed no refills left at pharmacy? :No  Calls to office within the last 48 hours?:No    Warm Transfer to (For Red Words): n/a    Patient notified that their information will be routed to the [facility] clinical triage team for review. Patient is advised that they will receive a phone call from the triage department. If symptom related and symptoms worsen before receiving a call back, the patient has been advised to proceed to the nearest ED.

## 2025-04-29 ENCOUNTER — OFFICE VISIT (OUTPATIENT)
Dept: INTERNAL MEDICINE CLINIC | Facility: CLINIC | Age: 89
End: 2025-04-29

## 2025-04-29 VITALS
SYSTOLIC BLOOD PRESSURE: 135 MMHG | RESPIRATION RATE: 18 BRPM | HEART RATE: 74 BPM | OXYGEN SATURATION: 93 % | BODY MASS INDEX: 33.38 KG/M2 | DIASTOLIC BLOOD PRESSURE: 59 MMHG | HEIGHT: 62 IN | TEMPERATURE: 97.3 F | WEIGHT: 181.4 LBS

## 2025-04-29 DIAGNOSIS — J18.9 MULTIFOCAL PNEUMONIA: ICD-10-CM

## 2025-04-29 DIAGNOSIS — Z09 HOSPITAL DISCHARGE FOLLOW-UP: Primary | ICD-10-CM

## 2025-04-29 DIAGNOSIS — J44.9 CHRONIC OBSTRUCTIVE PULMONARY DISEASE, UNSPECIFIED COPD TYPE (HCC): ICD-10-CM

## 2025-04-29 LAB
ANION GAP SERPL CALC-SCNC: 13 MMOL/L (ref 7–16)
BASOPHILS # BLD: 0.07 K/UL (ref 0–0.2)
BASOPHILS NFR BLD: 0.4 % (ref 0–2)
BUN SERPL-MCNC: 41 MG/DL (ref 8–23)
CALCIUM SERPL-MCNC: 9.3 MG/DL (ref 8.8–10.2)
CHLORIDE SERPL-SCNC: 100 MMOL/L (ref 98–107)
CO2 SERPL-SCNC: 22 MMOL/L (ref 20–29)
CREAT SERPL-MCNC: 1.87 MG/DL (ref 0.6–1.1)
DIFFERENTIAL METHOD BLD: ABNORMAL
EOSINOPHIL # BLD: 0.01 K/UL (ref 0–0.8)
EOSINOPHIL NFR BLD: 0.1 % (ref 0.5–7.8)
ERYTHROCYTE [DISTWIDTH] IN BLOOD BY AUTOMATED COUNT: 15.2 % (ref 11.9–14.6)
GLUCOSE SERPL-MCNC: 123 MG/DL (ref 70–99)
HCT VFR BLD AUTO: 39.4 % (ref 35.8–46.3)
HGB BLD-MCNC: 12.8 G/DL (ref 11.7–15.4)
IMM GRANULOCYTES # BLD AUTO: 0.54 K/UL (ref 0–0.5)
IMM GRANULOCYTES NFR BLD AUTO: 3.1 % (ref 0–5)
LYMPHOCYTES # BLD: 1.16 K/UL (ref 0.5–4.6)
LYMPHOCYTES NFR BLD: 6.7 % (ref 13–44)
MCH RBC QN AUTO: 30.8 PG (ref 26.1–32.9)
MCHC RBC AUTO-ENTMCNC: 32.5 G/DL (ref 31.4–35)
MCV RBC AUTO: 94.7 FL (ref 82–102)
MONOCYTES # BLD: 0.77 K/UL (ref 0.1–1.3)
MONOCYTES NFR BLD: 4.4 % (ref 4–12)
NEUTS SEG # BLD: 14.76 K/UL (ref 1.7–8.2)
NEUTS SEG NFR BLD: 85.3 % (ref 43–78)
NRBC # BLD: 0 K/UL (ref 0–0.2)
PLATELET # BLD AUTO: 278 K/UL (ref 150–450)
PMV BLD AUTO: 9.7 FL (ref 9.4–12.3)
POTASSIUM SERPL-SCNC: 5.2 MMOL/L (ref 3.5–5.1)
RBC # BLD AUTO: 4.16 M/UL (ref 4.05–5.2)
SODIUM SERPL-SCNC: 136 MMOL/L (ref 136–145)
WBC # BLD AUTO: 17.3 K/UL (ref 4.3–11.1)

## 2025-04-29 RX ORDER — MULTIVIT WITH MINERALS/LUTEIN
1000 TABLET ORAL DAILY
COMMUNITY

## 2025-04-29 RX ORDER — GUAIFENESIN 600 MG/1
600 TABLET, EXTENDED RELEASE ORAL DAILY
COMMUNITY

## 2025-04-29 RX ORDER — IPRATROPIUM BROMIDE AND ALBUTEROL SULFATE 2.5; .5 MG/3ML; MG/3ML
3 SOLUTION RESPIRATORY (INHALATION) 3 TIMES DAILY
COMMUNITY
Start: 2025-04-19

## 2025-04-29 RX ORDER — PREDNISONE 20 MG/1
40 TABLET ORAL DAILY
COMMUNITY
Start: 2025-04-21

## 2025-04-29 ASSESSMENT — ENCOUNTER SYMPTOMS
SHORTNESS OF BREATH: 0
WHEEZING: 1
ABDOMINAL PAIN: 0
COUGH: 1

## 2025-04-29 NOTE — TELEPHONE ENCOUNTER
RN reviewed with Dr. Villalba, patient should start Lynparza, but patient should only take 150 mg twice daily until we see her on 5/14. RN called patient and spoke with patient and son, Drew. Patient and son VU of this plan, no further questions for RN.

## 2025-04-29 NOTE — PROGRESS NOTES
[facility] Internal Medicine  [address] [address]     Office Visit    Patricia Mayen   1935   04/29/25       Subjective:     Chief Complaint   Patient presents with    Follow-Up from Hospital     Patient presents to the office today for a transitions of care from [facility] where she was admitted for Multifocal Pneumonia.  Patient was discharged to home.    Admission date: 04/16/25  Discharge date: 04/22/25     48 business hour phone call documented and reviewed in chart on date: 04/22/25          History of Present Illness  The patient is an 89-year-old female presenting for a transitional care management (TCM) visit and to review her lab results following her hospital discharge.    She was recently hospitalized due to bilateral pneumonia, during which she received home-based intravenous therapy twice daily. She has not required supplemental oxygen at home but maintains a portable tank for emergencies. Since her discharge, she has not experienced shortness of breath but reports occasional wheezing. She completed her course of intravenous antibiotics on 04/22/2025 and finished a prednisone taper. She has not experienced any fevers or chills. She monitors her oxygen saturation at home, which typically ranges from 95 to 96, occasionally reaching 98. She experiences occasional coughing, which produces clear sputum. She uses a flutter device provided by the hospital to aid expectoration. She has not made any changes to her medication regimen. She is currently taking Anoro Ellipta and Ventolin inhaler as needed for shortness of breath. She also takes an antitussive for her cough. She does not require physical therapy or home health services at this time.    She has not yet started chemotherapy for ovarian cancer, pending clearance from our team. She has an appointment with Dr. Gordon on 05/12/2025 and with her oncologist on 05/14/2025.    She has been experiencing elevated blood glucose levels

## 2025-04-29 NOTE — TELEPHONE ENCOUNTER
Returned call to patient's son, Drew. Per Drew, patient was admitted to [facility] for a few days and was discharged with home health, receiving antibiotics and breathing treatments. Patient was discharged from home health 1 week ago. Patient still has some congestion but is feeling better overall, finished prednisone this morning. RN will review with Dr. Villalba to see when she should start Lynparza and let patient know. Drew MEDRANO, no further questions for RN at this time.

## 2025-04-30 ENCOUNTER — RESULTS FOLLOW-UP (OUTPATIENT)
Dept: INTERNAL MEDICINE CLINIC | Facility: CLINIC | Age: 89
End: 2025-04-30

## 2025-05-05 ENCOUNTER — LAB (OUTPATIENT)
Dept: INTERNAL MEDICINE CLINIC | Facility: CLINIC | Age: 89
End: 2025-05-05

## 2025-05-05 DIAGNOSIS — I10 PRIMARY HYPERTENSION: Primary | ICD-10-CM

## 2025-05-05 DIAGNOSIS — I10 PRIMARY HYPERTENSION: ICD-10-CM

## 2025-05-05 DIAGNOSIS — N18.4 CKD (CHRONIC KIDNEY DISEASE) STAGE 4, GFR 15-29 ML/MIN (HCC): ICD-10-CM

## 2025-05-05 LAB
ANION GAP SERPL CALC-SCNC: 14 MMOL/L (ref 7–16)
BASOPHILS # BLD: 0.02 K/UL (ref 0–0.2)
BASOPHILS NFR BLD: 0.2 % (ref 0–2)
BUN SERPL-MCNC: 22 MG/DL (ref 8–23)
CALCIUM SERPL-MCNC: 9.5 MG/DL (ref 8.8–10.2)
CHLORIDE SERPL-SCNC: 96 MMOL/L (ref 98–107)
CO2 SERPL-SCNC: 25 MMOL/L (ref 20–29)
CREAT SERPL-MCNC: 1.44 MG/DL (ref 0.6–1.1)
DIFFERENTIAL METHOD BLD: ABNORMAL
EOSINOPHIL # BLD: 0.17 K/UL (ref 0–0.8)
EOSINOPHIL NFR BLD: 2 % (ref 0.5–7.8)
ERYTHROCYTE [DISTWIDTH] IN BLOOD BY AUTOMATED COUNT: 14.8 % (ref 11.9–14.6)
GLUCOSE SERPL-MCNC: 112 MG/DL (ref 70–99)
HCT VFR BLD AUTO: 42.2 % (ref 35.8–46.3)
HGB BLD-MCNC: 13.2 G/DL (ref 11.7–15.4)
IMM GRANULOCYTES # BLD AUTO: 0.07 K/UL (ref 0–0.5)
IMM GRANULOCYTES NFR BLD AUTO: 0.8 % (ref 0–5)
LYMPHOCYTES # BLD: 1.61 K/UL (ref 0.5–4.6)
LYMPHOCYTES NFR BLD: 19.2 % (ref 13–44)
MCH RBC QN AUTO: 30.7 PG (ref 26.1–32.9)
MCHC RBC AUTO-ENTMCNC: 31.3 G/DL (ref 31.4–35)
MCV RBC AUTO: 98.1 FL (ref 82–102)
MONOCYTES # BLD: 0.91 K/UL (ref 0.1–1.3)
MONOCYTES NFR BLD: 10.8 % (ref 4–12)
NEUTS SEG # BLD: 5.62 K/UL (ref 1.7–8.2)
NEUTS SEG NFR BLD: 67 % (ref 43–78)
NRBC # BLD: 0 K/UL (ref 0–0.2)
PLATELET # BLD AUTO: 214 K/UL (ref 150–450)
PMV BLD AUTO: 10 FL (ref 9.4–12.3)
POTASSIUM SERPL-SCNC: 4.4 MMOL/L (ref 3.5–5.1)
RBC # BLD AUTO: 4.3 M/UL (ref 4.05–5.2)
SODIUM SERPL-SCNC: 135 MMOL/L (ref 136–145)
WBC # BLD AUTO: 8.4 K/UL (ref 4.3–11.1)

## 2025-05-06 ENCOUNTER — RESULTS FOLLOW-UP (OUTPATIENT)
Dept: INTERNAL MEDICINE CLINIC | Facility: CLINIC | Age: 89
End: 2025-05-06

## 2025-05-09 DIAGNOSIS — J18.9 MULTIFOCAL PNEUMONIA: ICD-10-CM

## 2025-05-14 ENCOUNTER — OFFICE VISIT (OUTPATIENT)
Dept: ONCOLOGY | Age: 89
End: 2025-05-14
Payer: MEDICARE

## 2025-05-14 ENCOUNTER — HOSPITAL ENCOUNTER (OUTPATIENT)
Dept: LAB | Age: 89
Discharge: HOME | End: 2025-05-14
Payer: MEDICARE

## 2025-05-14 VITALS
HEIGHT: 62 IN | TEMPERATURE: 99.1 F | WEIGHT: 183 LBS | BODY MASS INDEX: 33.68 KG/M2 | RESPIRATION RATE: 96 BRPM | SYSTOLIC BLOOD PRESSURE: 154 MMHG | DIASTOLIC BLOOD PRESSURE: 74 MMHG | HEART RATE: 84 BPM

## 2025-05-14 DIAGNOSIS — C54.9 MIXED MULLERIAN TUMOR (HCC): ICD-10-CM

## 2025-05-14 DIAGNOSIS — R97.1 ELEVATED CANCER ANTIGEN 125 (CA 125): ICD-10-CM

## 2025-05-14 DIAGNOSIS — C56.9 OVARY CANCER, UNSPECIFIED LATERALITY (HCC): Primary | ICD-10-CM

## 2025-05-14 DIAGNOSIS — C54.9 MALIGNANT NEOPLASM OF CORPUS UTERI, UNSPECIFIED (HCC): ICD-10-CM

## 2025-05-14 DIAGNOSIS — C56.9 OVARY CANCER, UNSPECIFIED LATERALITY (HCC): ICD-10-CM

## 2025-05-14 LAB
ALBUMIN SERPL-MCNC: 3.4 G/DL (ref 3.2–4.6)
ALBUMIN/GLOB SERPL: 0.9 (ref 1–1.9)
ALP SERPL-CCNC: 114 U/L (ref 35–104)
ALT SERPL-CCNC: 14 U/L (ref 8–45)
ANION GAP SERPL CALC-SCNC: 13 MMOL/L (ref 7–16)
AST SERPL-CCNC: 23 U/L (ref 15–37)
BASOPHILS # BLD: 0.03 K/UL (ref 0–0.2)
BASOPHILS NFR BLD: 0.5 % (ref 0–2)
BILIRUB SERPL-MCNC: 0.5 MG/DL (ref 0–1.2)
BUN SERPL-MCNC: 21 MG/DL (ref 8–23)
CALCIUM SERPL-MCNC: 10.3 MG/DL (ref 8.8–10.2)
CANCER AG125 SERPL-ACNC: 1741 U/ML (ref 0–38)
CHLORIDE SERPL-SCNC: 100 MMOL/L (ref 98–107)
CO2 SERPL-SCNC: 24 MMOL/L (ref 20–29)
CREAT SERPL-MCNC: 1.48 MG/DL (ref 0.6–1.1)
DIFFERENTIAL METHOD BLD: ABNORMAL
EOSINOPHIL # BLD: 0.19 K/UL (ref 0–0.8)
EOSINOPHIL NFR BLD: 3 % (ref 0.5–7.8)
ERYTHROCYTE [DISTWIDTH] IN BLOOD BY AUTOMATED COUNT: 15.4 % (ref 11.9–14.6)
GLOBULIN SER CALC-MCNC: 4 G/DL (ref 2.3–3.5)
GLUCOSE SERPL-MCNC: 156 MG/DL (ref 70–99)
HCT VFR BLD AUTO: 39.5 % (ref 35.8–46.3)
HGB BLD-MCNC: 12.3 G/DL (ref 11.7–15.4)
IMM GRANULOCYTES # BLD AUTO: 0.17 K/UL (ref 0–0.5)
IMM GRANULOCYTES NFR BLD AUTO: 2.7 % (ref 0–5)
LYMPHOCYTES # BLD: 1.52 K/UL (ref 0.5–4.6)
LYMPHOCYTES NFR BLD: 24.1 % (ref 13–44)
MCH RBC QN AUTO: 31 PG (ref 26.1–32.9)
MCHC RBC AUTO-ENTMCNC: 31.1 G/DL (ref 31.4–35)
MCV RBC AUTO: 99.5 FL (ref 82–102)
MONOCYTES # BLD: 0.65 K/UL (ref 0.1–1.3)
MONOCYTES NFR BLD: 10.3 % (ref 4–12)
NEUTS SEG # BLD: 3.75 K/UL (ref 1.7–8.2)
NEUTS SEG NFR BLD: 59.4 % (ref 43–78)
NRBC # BLD: 0 K/UL (ref 0–0.2)
PLATELET # BLD AUTO: 195 K/UL (ref 150–450)
PMV BLD AUTO: 9.5 FL (ref 9.4–12.3)
POTASSIUM SERPL-SCNC: 4.5 MMOL/L (ref 3.5–5.1)
PROT SERPL-MCNC: 7.5 G/DL (ref 6.3–8.2)
RBC # BLD AUTO: 3.97 M/UL (ref 4.05–5.2)
SODIUM SERPL-SCNC: 137 MMOL/L (ref 136–145)
WBC # BLD AUTO: 6.3 K/UL (ref 4.3–11.1)

## 2025-05-14 PROCEDURE — 80053 COMPREHEN METABOLIC PANEL: CPT

## 2025-05-14 PROCEDURE — G8428 CUR MEDS NOT DOCUMENT: HCPCS | Performed by: INTERNAL MEDICINE

## 2025-05-14 PROCEDURE — 1090F PRES/ABSN URINE INCON ASSESS: CPT | Performed by: INTERNAL MEDICINE

## 2025-05-14 PROCEDURE — 1036F TOBACCO NON-USER: CPT | Performed by: INTERNAL MEDICINE

## 2025-05-14 PROCEDURE — 99214 OFFICE O/P EST MOD 30 MIN: CPT | Performed by: INTERNAL MEDICINE

## 2025-05-14 PROCEDURE — 36415 COLL VENOUS BLD VENIPUNCTURE: CPT

## 2025-05-14 PROCEDURE — 1126F AMNT PAIN NOTED NONE PRSNT: CPT | Performed by: INTERNAL MEDICINE

## 2025-05-14 PROCEDURE — 85025 COMPLETE CBC W/AUTO DIFF WBC: CPT

## 2025-05-14 PROCEDURE — 1123F ACP DISCUSS/DSCN MKR DOCD: CPT | Performed by: INTERNAL MEDICINE

## 2025-05-14 PROCEDURE — 86304 IMMUNOASSAY TUMOR CA 125: CPT

## 2025-05-14 PROCEDURE — G8417 CALC BMI ABV UP PARAM F/U: HCPCS | Performed by: INTERNAL MEDICINE

## 2025-05-14 ASSESSMENT — PATIENT HEALTH QUESTIONNAIRE - PHQ9
SUM OF ALL RESPONSES TO PHQ QUESTIONS 1-9: 0
2. FEELING DOWN, DEPRESSED OR HOPELESS: NOT AT ALL
SUM OF ALL RESPONSES TO PHQ QUESTIONS 1-9: 0
SUM OF ALL RESPONSES TO PHQ QUESTIONS 1-9: 0
1. LITTLE INTEREST OR PLEASURE IN DOING THINGS: NOT AT ALL
SUM OF ALL RESPONSES TO PHQ QUESTIONS 1-9: 0

## 2025-05-14 NOTE — PATIENT INSTRUCTIONS
not be comparable to labs using different manufacturers/methods.             Please refer to After Visit Summary or [employer] for upcoming appointment information. Please call our office for rescheduling needs at least 24 hours before your scheduled appointment time.If you have any questions regarding your upcoming schedule please reach out to your care team through [employer] or call [phone].     Please notify your assigned Nurse Navigator of any unplanned hospital admissions or Emergency Room visits within 24 hours of discharge.    -------------------------------------------------------------------------------------------------------------------  Please call our office at [phone] if you have any  of the following symptoms:   Fever of 100.5 or greater  Chills  Shortness of breath  Swelling or pain in one leg    After office hours an answering service is available and will contact a provider for emergencies or if you are experiencing any of the above symptoms.        GERSON OHARA RN    Oral Chemotherapy Adherence:     Current Regimen:  Drug Name: Olaparib  Dose: 300 mg in the morning, 150 mg in the evening  Frequency: daily    Barriers to care identified including (financial, physical, psychosocial) : No    Missed doses reported: No    Patient verbalizes understanding of what to do in the event of a missed dose: Yes    Adverse reactions/toxicities reported:None, See Review of Systems

## 2025-06-09 ENCOUNTER — HOSPITAL ENCOUNTER (OUTPATIENT)
Dept: LAB | Age: 89
Discharge: HOME | End: 2025-06-09
Payer: MEDICARE

## 2025-06-09 ENCOUNTER — OFFICE VISIT (OUTPATIENT)
Dept: ONCOLOGY | Age: 89
End: 2025-06-09
Payer: MEDICARE

## 2025-06-09 VITALS
SYSTOLIC BLOOD PRESSURE: 155 MMHG | TEMPERATURE: 98 F | DIASTOLIC BLOOD PRESSURE: 63 MMHG | OXYGEN SATURATION: 95 % | HEART RATE: 77 BPM | BODY MASS INDEX: 33.34 KG/M2 | HEIGHT: 62 IN | RESPIRATION RATE: 16 BRPM | WEIGHT: 181.2 LBS

## 2025-06-09 DIAGNOSIS — C56.9 OVARY CANCER, UNSPECIFIED LATERALITY (HCC): Primary | ICD-10-CM

## 2025-06-09 DIAGNOSIS — C56.9 OVARY CANCER, UNSPECIFIED LATERALITY (HCC): ICD-10-CM

## 2025-06-09 DIAGNOSIS — R97.1 ELEVATED CANCER ANTIGEN 125 (CA 125): ICD-10-CM

## 2025-06-09 LAB
ALBUMIN SERPL-MCNC: 3.7 G/DL (ref 3.2–4.6)
ALBUMIN/GLOB SERPL: 0.9 (ref 1–1.9)
ALP SERPL-CCNC: 109 U/L (ref 35–104)
ALT SERPL-CCNC: 9 U/L (ref 8–45)
ANION GAP SERPL CALC-SCNC: 14 MMOL/L (ref 7–16)
AST SERPL-CCNC: 23 U/L (ref 15–37)
BASOPHILS # BLD: 0.07 K/UL (ref 0–0.2)
BASOPHILS NFR BLD: 0.8 % (ref 0–2)
BILIRUB SERPL-MCNC: 0.6 MG/DL (ref 0–1.2)
BUN SERPL-MCNC: 23 MG/DL (ref 8–23)
CALCIUM SERPL-MCNC: 10 MG/DL (ref 8.8–10.2)
CANCER AG125 SERPL-ACNC: 1172 U/ML (ref 0–38)
CHLORIDE SERPL-SCNC: 100 MMOL/L (ref 98–107)
CO2 SERPL-SCNC: 23 MMOL/L (ref 20–29)
CREAT SERPL-MCNC: 1.32 MG/DL (ref 0.6–1.1)
DIFFERENTIAL METHOD BLD: ABNORMAL
EOSINOPHIL # BLD: 0.14 K/UL (ref 0–0.8)
EOSINOPHIL NFR BLD: 1.7 % (ref 0.5–7.8)
ERYTHROCYTE [DISTWIDTH] IN BLOOD BY AUTOMATED COUNT: 18 % (ref 11.9–14.6)
GLOBULIN SER CALC-MCNC: 3.9 G/DL (ref 2.3–3.5)
GLUCOSE SERPL-MCNC: 94 MG/DL (ref 70–99)
HCT VFR BLD AUTO: 42.1 % (ref 35.8–46.3)
HGB BLD-MCNC: 13.4 G/DL (ref 11.7–15.4)
IMM GRANULOCYTES # BLD AUTO: 0.1 K/UL (ref 0–0.5)
IMM GRANULOCYTES NFR BLD AUTO: 1.2 % (ref 0–5)
LYMPHOCYTES # BLD: 1.95 K/UL (ref 0.5–4.6)
LYMPHOCYTES NFR BLD: 23.4 % (ref 13–44)
MCH RBC QN AUTO: 32.5 PG (ref 26.1–32.9)
MCHC RBC AUTO-ENTMCNC: 31.8 G/DL (ref 31.4–35)
MCV RBC AUTO: 102.2 FL (ref 82–102)
MONOCYTES # BLD: 0.78 K/UL (ref 0.1–1.3)
MONOCYTES NFR BLD: 9.4 % (ref 4–12)
NEUTS SEG # BLD: 5.3 K/UL (ref 1.7–8.2)
NEUTS SEG NFR BLD: 63.5 % (ref 43–78)
NRBC # BLD: 0 K/UL (ref 0–0.2)
PLATELET # BLD AUTO: 208 K/UL (ref 150–450)
PMV BLD AUTO: 9.2 FL (ref 9.4–12.3)
POTASSIUM SERPL-SCNC: 4.4 MMOL/L (ref 3.5–5.1)
PROT SERPL-MCNC: 7.6 G/DL (ref 6.3–8.2)
RBC # BLD AUTO: 4.12 M/UL (ref 4.05–5.2)
SODIUM SERPL-SCNC: 137 MMOL/L (ref 136–145)
WBC # BLD AUTO: 8.3 K/UL (ref 4.3–11.1)

## 2025-06-09 PROCEDURE — 36415 COLL VENOUS BLD VENIPUNCTURE: CPT

## 2025-06-09 PROCEDURE — 1036F TOBACCO NON-USER: CPT | Performed by: NURSE PRACTITIONER

## 2025-06-09 PROCEDURE — 99214 OFFICE O/P EST MOD 30 MIN: CPT | Performed by: NURSE PRACTITIONER

## 2025-06-09 PROCEDURE — 86304 IMMUNOASSAY TUMOR CA 125: CPT

## 2025-06-09 PROCEDURE — 1126F AMNT PAIN NOTED NONE PRSNT: CPT | Performed by: NURSE PRACTITIONER

## 2025-06-09 PROCEDURE — G8427 DOCREV CUR MEDS BY ELIG CLIN: HCPCS | Performed by: NURSE PRACTITIONER

## 2025-06-09 PROCEDURE — 1159F MED LIST DOCD IN RCRD: CPT | Performed by: NURSE PRACTITIONER

## 2025-06-09 PROCEDURE — 80053 COMPREHEN METABOLIC PANEL: CPT

## 2025-06-09 PROCEDURE — 85025 COMPLETE CBC W/AUTO DIFF WBC: CPT

## 2025-06-09 PROCEDURE — 1160F RVW MEDS BY RX/DR IN RCRD: CPT | Performed by: NURSE PRACTITIONER

## 2025-06-09 PROCEDURE — 1123F ACP DISCUSS/DSCN MKR DOCD: CPT | Performed by: NURSE PRACTITIONER

## 2025-06-09 PROCEDURE — 1090F PRES/ABSN URINE INCON ASSESS: CPT | Performed by: NURSE PRACTITIONER

## 2025-06-09 PROCEDURE — G8417 CALC BMI ABV UP PARAM F/U: HCPCS | Performed by: NURSE PRACTITIONER

## 2025-06-09 NOTE — PROGRESS NOTES
Connections: Not At Risk (4/30/2025)    Received from [facility] - Social Connections     Needs Help with Day-to-Day Activities: I get all the help I need     Frequency of Feeling Lonely or Isolated: Never   Intimate Partner Violence: Unknown (8/16/2023)    Received from [facility], [facility]    Intimate Partner Violence     Fear of Current or Ex-Partner: Not asked     Emotionally Abused: Not asked     Physically Abused: Not asked     Sexually Abused: Not asked   Housing Stability: Not At Risk (4/30/2025)    Received from [facility] - Inadequate Housing     Current Living Situation: I have a steady place to live     Housing Problems: None of the above     Current Outpatient Medications   Medication Sig Dispense Refill    Cyanocobalamin (VITAMIN B 12 PO) Take by mouth      guaiFENesin (MUCINEX) 600 MG extended release tablet Take 1 tablet by mouth daily      ipratropium 0.5 mg-albuterol 2.5 mg (DUONEB) 0.5-2.5 (3) MG/3ML SOLN nebulizer solution Inhale 3 mLs into the lungs 3 times daily      Ascorbic Acid (VITAMIN C) 1000 MG tablet Take 1 tablet by mouth daily      prochlorperazine (COMPAZINE) 5 MG tablet Take 1 tablet by mouth every 6 hours as needed for Nausea 60 tablet 1    Cholecalciferol (VITAMIN D3) 50 MCG (2000 UT) CAPS Take by mouth      Loratadine (CLARITIN PO) Take by mouth      Multiple Minerals-Vitamins (CALCIUM-MAGNESIUM-ZINC-D3 PO) Take by mouth daily      Dextromethorphan-guaiFENesin (MUCUS DM PO) Take by mouth as needed      acetaminophen (TYLENOL) 500 MG tablet Take 1 tablet by mouth every 6 hours as needed for Pain      OXYGEN 2.5 liters at night      Multiple Vitamin (MULTIVITAMIN ADULT PO) Take by mouth Daily      albuterol sulfate HFA (PROVENTIL;VENTOLIN;PROAIR) 108 (90 Base) MCG/ACT inhaler INHALE 2 PUFFS BY MOUTH EVERY 6 HOURS AS NEEDED      aspirin 81 MG chewable tablet Take 1 tablet by mouth daily      umeclidinium-vilanterol (ANORO ELLIPTA) 62.5-25 MCG/INH AEPB

## 2025-07-07 ENCOUNTER — OFFICE VISIT (OUTPATIENT)
Dept: INTERNAL MEDICINE CLINIC | Facility: CLINIC | Age: 89
End: 2025-07-07
Payer: MEDICARE

## 2025-07-07 VITALS
HEART RATE: 81 BPM | HEIGHT: 62 IN | SYSTOLIC BLOOD PRESSURE: 145 MMHG | TEMPERATURE: 97.9 F | OXYGEN SATURATION: 97 % | DIASTOLIC BLOOD PRESSURE: 54 MMHG | WEIGHT: 183 LBS | RESPIRATION RATE: 16 BRPM | BODY MASS INDEX: 33.68 KG/M2

## 2025-07-07 DIAGNOSIS — N18.30 STAGE 3 CHRONIC KIDNEY DISEASE, UNSPECIFIED WHETHER STAGE 3A OR 3B CKD (HCC): ICD-10-CM

## 2025-07-07 DIAGNOSIS — J44.9 CHRONIC OBSTRUCTIVE PULMONARY DISEASE, UNSPECIFIED COPD TYPE (HCC): ICD-10-CM

## 2025-07-07 DIAGNOSIS — E11.22 TYPE 2 DIABETES MELLITUS WITH STAGE 3B CHRONIC KIDNEY DISEASE, WITHOUT LONG-TERM CURRENT USE OF INSULIN (HCC): Primary | ICD-10-CM

## 2025-07-07 DIAGNOSIS — I10 PRIMARY HYPERTENSION: ICD-10-CM

## 2025-07-07 DIAGNOSIS — C56.9 OVARY CANCER, UNSPECIFIED LATERALITY (HCC): ICD-10-CM

## 2025-07-07 DIAGNOSIS — N18.32 TYPE 2 DIABETES MELLITUS WITH STAGE 3B CHRONIC KIDNEY DISEASE, WITHOUT LONG-TERM CURRENT USE OF INSULIN (HCC): Primary | ICD-10-CM

## 2025-07-07 PROCEDURE — G8417 CALC BMI ABV UP PARAM F/U: HCPCS | Performed by: INTERNAL MEDICINE

## 2025-07-07 PROCEDURE — 1159F MED LIST DOCD IN RCRD: CPT | Performed by: INTERNAL MEDICINE

## 2025-07-07 PROCEDURE — 1090F PRES/ABSN URINE INCON ASSESS: CPT | Performed by: INTERNAL MEDICINE

## 2025-07-07 PROCEDURE — 1160F RVW MEDS BY RX/DR IN RCRD: CPT | Performed by: INTERNAL MEDICINE

## 2025-07-07 PROCEDURE — 99213 OFFICE O/P EST LOW 20 MIN: CPT | Performed by: INTERNAL MEDICINE

## 2025-07-07 PROCEDURE — 1036F TOBACCO NON-USER: CPT | Performed by: INTERNAL MEDICINE

## 2025-07-07 PROCEDURE — 1126F AMNT PAIN NOTED NONE PRSNT: CPT | Performed by: INTERNAL MEDICINE

## 2025-07-07 PROCEDURE — G8427 DOCREV CUR MEDS BY ELIG CLIN: HCPCS | Performed by: INTERNAL MEDICINE

## 2025-07-07 PROCEDURE — 3023F SPIROM DOC REV: CPT | Performed by: INTERNAL MEDICINE

## 2025-07-07 PROCEDURE — 1123F ACP DISCUSS/DSCN MKR DOCD: CPT | Performed by: INTERNAL MEDICINE

## 2025-07-07 ASSESSMENT — PATIENT HEALTH QUESTIONNAIRE - PHQ9
SUM OF ALL RESPONSES TO PHQ QUESTIONS 1-9: 0
2. FEELING DOWN, DEPRESSED OR HOPELESS: NOT AT ALL
1. LITTLE INTEREST OR PLEASURE IN DOING THINGS: NOT AT ALL

## 2025-07-07 NOTE — PROGRESS NOTES
07/07/2025   Location:[facility] PHYSICIAN SERVICES  [facility] INTERNAL MEDICINE  [place]  Patient #:  [id number]  YOB: 1935        History of Present Illness     Chief Complaint   Patient presents with    Follow-up       Ms. Mayen is a 89 y.o. female  who presents for Follow up and management of chronic medical issues.  Accompanied  by her son  Chronic active medical issues HTN, DM, CKD, COPD, and ovarian cancer on chemo for recurrence currently  HTN controlled   DM diet controlled   COPD controlled on Anoro and albuterol     Keeps regular follow up with oncologist  Keeps regular follow up with nephrologist    she is back on chemo for spread of her ovarian cancer  Accompanied by son Drew Nielsen   No new complaints.     Diabetes managed with Amaryl  HTN managed with Lisinopril  Pleased her Hair growing back    Home Bps 130s/    131/69 today.    Last Labs  CBC:   Lab Results   Component Value Date/Time    WBC 8.2 07/09/2025 01:54 PM    RBC 3.89 07/09/2025 01:54 PM    HGB 13.2 07/09/2025 01:54 PM    HCT 40.2 07/09/2025 01:54 PM    .3 07/09/2025 01:54 PM    MCH 33.9 07/09/2025 01:54 PM    MCHC 32.8 07/09/2025 01:54 PM    RDW 19.8 07/09/2025 01:54 PM     07/09/2025 01:54 PM    MPV 9.9 07/09/2025 01:54 PM     CMP:    Lab Results   Component Value Date/Time     07/09/2025 01:54 PM    K 4.3 07/09/2025 01:54 PM    CL 99 07/09/2025 01:54 PM    CO2 23 07/09/2025 01:54 PM    BUN 22 07/09/2025 01:54 PM    CREATININE 1.51 07/09/2025 01:54 PM    GFRAA 39 09/21/2022 09:43 AM    AGRATIO 0.5 05/18/2022 01:25 PM    LABGLOM 33 07/09/2025 01:54 PM    LABGLOM 34 01/31/2024 01:46 PM    GLUCOSE 89 07/09/2025 01:54 PM    CALCIUM 10.2 07/09/2025 01:54 PM    BILITOT 0.7 07/09/2025 01:54 PM    ALKPHOS 124 07/09/2025 01:54 PM    ALKPHOS 117 05/18/2022 01:25 PM    AST 27 07/09/2025 01:54 PM    ALT 16 07/09/2025 01:54 PM     FLP:    Lab Results   Component Value Date/Time    TRIG 332 06/13/2023 03:56 PM

## 2025-07-09 ENCOUNTER — OFFICE VISIT (OUTPATIENT)
Dept: ONCOLOGY | Age: 89
End: 2025-07-09
Payer: MEDICARE

## 2025-07-09 ENCOUNTER — HOSPITAL ENCOUNTER (OUTPATIENT)
Dept: LAB | Age: 89
Discharge: HOME | End: 2025-07-09
Payer: MEDICARE

## 2025-07-09 VITALS
SYSTOLIC BLOOD PRESSURE: 150 MMHG | WEIGHT: 184 LBS | DIASTOLIC BLOOD PRESSURE: 60 MMHG | HEIGHT: 62 IN | TEMPERATURE: 97.5 F | HEART RATE: 74 BPM | RESPIRATION RATE: 22 BRPM | BODY MASS INDEX: 33.86 KG/M2 | OXYGEN SATURATION: 97 %

## 2025-07-09 DIAGNOSIS — C57.7 MALIGNANT NEOPLASM OF OTHER SPECIFIED FEMALE GENITAL ORGANS (HCC): ICD-10-CM

## 2025-07-09 DIAGNOSIS — C56.9 OVARY CANCER, UNSPECIFIED LATERALITY (HCC): ICD-10-CM

## 2025-07-09 DIAGNOSIS — R97.1 ELEVATED CANCER ANTIGEN 125 (CA 125): ICD-10-CM

## 2025-07-09 LAB
ALBUMIN SERPL-MCNC: 4.1 G/DL (ref 3.2–4.6)
ALBUMIN/GLOB SERPL: 1 (ref 1–1.9)
ALP SERPL-CCNC: 124 U/L (ref 35–104)
ALT SERPL-CCNC: 16 U/L (ref 8–45)
ANION GAP SERPL CALC-SCNC: 14 MMOL/L (ref 7–16)
AST SERPL-CCNC: 27 U/L (ref 15–37)
BASOPHILS # BLD: 0.06 K/UL (ref 0–0.2)
BASOPHILS NFR BLD: 0.7 % (ref 0–2)
BILIRUB SERPL-MCNC: 0.7 MG/DL (ref 0–1.2)
BUN SERPL-MCNC: 22 MG/DL (ref 8–23)
CALCIUM SERPL-MCNC: 10.2 MG/DL (ref 8.8–10.2)
CANCER AG125 SERPL-ACNC: 1617 U/ML (ref 0–38)
CHLORIDE SERPL-SCNC: 99 MMOL/L (ref 98–107)
CO2 SERPL-SCNC: 23 MMOL/L (ref 20–29)
CREAT SERPL-MCNC: 1.51 MG/DL (ref 0.6–1.1)
DIFFERENTIAL METHOD BLD: ABNORMAL
EOSINOPHIL # BLD: 0.15 K/UL (ref 0–0.8)
EOSINOPHIL NFR BLD: 1.8 % (ref 0.5–7.8)
ERYTHROCYTE [DISTWIDTH] IN BLOOD BY AUTOMATED COUNT: 19.8 % (ref 11.9–14.6)
GLOBULIN SER CALC-MCNC: 4 G/DL (ref 2.3–3.5)
GLUCOSE SERPL-MCNC: 89 MG/DL (ref 70–99)
HCT VFR BLD AUTO: 40.2 % (ref 35.8–46.3)
HGB BLD-MCNC: 13.2 G/DL (ref 11.7–15.4)
IMM GRANULOCYTES # BLD AUTO: 0.1 K/UL (ref 0–0.5)
IMM GRANULOCYTES NFR BLD AUTO: 1.2 % (ref 0–5)
LYMPHOCYTES # BLD: 2.17 K/UL (ref 0.5–4.6)
LYMPHOCYTES NFR BLD: 26.4 % (ref 13–44)
MCH RBC QN AUTO: 33.9 PG (ref 26.1–32.9)
MCHC RBC AUTO-ENTMCNC: 32.8 G/DL (ref 31.4–35)
MCV RBC AUTO: 103.3 FL (ref 82–102)
MONOCYTES # BLD: 0.74 K/UL (ref 0.1–1.3)
MONOCYTES NFR BLD: 9 % (ref 4–12)
NEUTS SEG # BLD: 5.01 K/UL (ref 1.7–8.2)
NEUTS SEG NFR BLD: 60.9 % (ref 43–78)
NRBC # BLD: 0.03 K/UL (ref 0–0.2)
PLATELET # BLD AUTO: 245 K/UL (ref 150–450)
PMV BLD AUTO: 9.9 FL (ref 9.4–12.3)
POTASSIUM SERPL-SCNC: 4.3 MMOL/L (ref 3.5–5.1)
PROT SERPL-MCNC: 8.1 G/DL (ref 6.3–8.2)
RBC # BLD AUTO: 3.89 M/UL (ref 4.05–5.2)
SODIUM SERPL-SCNC: 136 MMOL/L (ref 136–145)
WBC # BLD AUTO: 8.2 K/UL (ref 4.3–11.1)

## 2025-07-09 PROCEDURE — 80053 COMPREHEN METABOLIC PANEL: CPT

## 2025-07-09 PROCEDURE — 1090F PRES/ABSN URINE INCON ASSESS: CPT | Performed by: INTERNAL MEDICINE

## 2025-07-09 PROCEDURE — 1126F AMNT PAIN NOTED NONE PRSNT: CPT | Performed by: INTERNAL MEDICINE

## 2025-07-09 PROCEDURE — 85025 COMPLETE CBC W/AUTO DIFF WBC: CPT

## 2025-07-09 PROCEDURE — 1123F ACP DISCUSS/DSCN MKR DOCD: CPT | Performed by: INTERNAL MEDICINE

## 2025-07-09 PROCEDURE — G8417 CALC BMI ABV UP PARAM F/U: HCPCS | Performed by: INTERNAL MEDICINE

## 2025-07-09 PROCEDURE — G8428 CUR MEDS NOT DOCUMENT: HCPCS | Performed by: INTERNAL MEDICINE

## 2025-07-09 PROCEDURE — 99214 OFFICE O/P EST MOD 30 MIN: CPT | Performed by: INTERNAL MEDICINE

## 2025-07-09 PROCEDURE — 86304 IMMUNOASSAY TUMOR CA 125: CPT

## 2025-07-09 PROCEDURE — 1036F TOBACCO NON-USER: CPT | Performed by: INTERNAL MEDICINE

## 2025-07-09 PROCEDURE — 36415 COLL VENOUS BLD VENIPUNCTURE: CPT

## 2025-07-09 ASSESSMENT — PATIENT HEALTH QUESTIONNAIRE - PHQ9
SUM OF ALL RESPONSES TO PHQ QUESTIONS 1-9: 0
SUM OF ALL RESPONSES TO PHQ QUESTIONS 1-9: 0
2. FEELING DOWN, DEPRESSED OR HOPELESS: NOT AT ALL
SUM OF ALL RESPONSES TO PHQ QUESTIONS 1-9: 0
SUM OF ALL RESPONSES TO PHQ QUESTIONS 1-9: 0
1. LITTLE INTEREST OR PLEASURE IN DOING THINGS: NOT AT ALL

## 2025-07-09 NOTE — PROGRESS NOTES
Food Insecurity: No Food Insecurity (4/30/2025)    Received from [facility]    Hunger Vital Sign     Worried About Running Out of Food in the Last Year: Never true     Ran Out of Food in the Last Year: Never true   Transportation Needs: Not At Risk (4/30/2025)    Received from [facility] - Transportation     Lack of Transportation: No   Physical Activity: Inactive (4/30/2025)    Received from [facility]    Physical Activity     Days of Exercise per Week: 0 days     On average, how many minutes do you exercise per day?: 0     Total Minutes of Exercise Per Week: 0   Stress: No Stress Concern Present (1/17/2023)    [facility] - Occupational Stress Questionnaire     Feeling of Stress : Not at all   Social Connections: Not At Risk (4/30/2025)    Received from [facility] - Social Connections     Needs Help with Day-to-Day Activities: I get all the help I need     Frequency of Feeling Lonely or Isolated: Never   Intimate Partner Violence: Unknown (8/16/2023)    Received from [facility], [facility]    Intimate Partner Violence     Fear of Current or Ex-Partner: Not asked     Emotionally Abused: Not asked     Physically Abused: Not asked     Sexually Abused: Not asked   Housing Stability: Not At Risk (4/30/2025)    Received from [facility] - Inadequate Housing     Current Living Situation: I have a steady place to live     Housing Problems: None of the above     Current Outpatient Medications   Medication Sig Dispense Refill    Cyanocobalamin (VITAMIN B 12 PO) Take by mouth      guaiFENesin (MUCINEX) 600 MG extended release tablet Take 1 tablet by mouth daily      ipratropium 0.5 mg-albuterol 2.5 mg (DUONEB) 0.5-2.5 (3) MG/3ML SOLN nebulizer solution Inhale 3 mLs into the lungs 3 times daily prn      Ascorbic Acid (VITAMIN C) 1000 MG tablet Take 1 tablet by mouth daily      Olaparib 150 MG TABS Take 300 mg by mouth in the morning and at bedtime 120

## 2025-07-09 NOTE — PATIENT INSTRUCTIONS
Patient Information from Today's Visit    The members of your [facility] are listed below:    Physician Provider: Dr. Robert Villalba  Advanced Practice Clinician: Debo Bland  Registered Nurse: Elías NGUYỄN   Nurse Navigator: N/A  Medical Assistant: Rosalva \"Diamond\" HARRIET.   : Rimma \"Prabha\" C.   Supportive Care Services: ANDREWS Waller    Diagnosis (Information Sheet Provided on Day of Diagnosis): Ovarian Cancer    Follow Up Instructions:   4 weeks with CT scan prior    Has Treatment Plan Been Finalized? Yes - see prior treatment plan documentation    Current Labs:   Hospital Outpatient Visit on 07/09/2025   Component Date Value Ref Range Status     07/09/2025 1617 (H)  0 - 38 U/mL Final    Comment: Roche ECLIA methodology  Patient's results of tumor marker testing may not be comparable to labs using different manufacturers/methods.      Sodium 07/09/2025 136  136 - 145 mmol/L Final    Potassium 07/09/2025 4.3  3.5 - 5.1 mmol/L Final    Chloride 07/09/2025 99  98 - 107 mmol/L Final    CO2 07/09/2025 23  20 - 29 mmol/L Final    Anion Gap 07/09/2025 14  7 - 16 mmol/L Final    Glucose 07/09/2025 89  70 - 99 mg/dL Final    Comment: <70 mg/dL Consistent with, but not fully diagnostic of hypoglycemia.  100 - 125 mg/dL Impaired fasting glucose/consistent with pre-diabetes mellitus.  > 126 mg/dl Fasting glucose consistent with overt diabetes mellitus      BUN 07/09/2025 22  8 - 23 MG/DL Final    Creatinine 07/09/2025 1.51 (H)  0.60 - 1.10 MG/DL Final    Est, Glom Filt Rate 07/09/2025 33 (L)  >60 ml/min/1.73m2 Final    Comment:    Pediatric calculator link: https://www.kidney.org/professionals/kdoqi/gfr_calculatorped     These results are not intended for use in patients <18 years of age.     eGFR results are calculated without a race factor using  the 2021 CKD-EPI equation. Careful clinical correlation is recommended, particularly when comparing to results calculated using previous equations.  The

## 2025-08-04 ENCOUNTER — HOSPITAL ENCOUNTER (OUTPATIENT)
Dept: CT IMAGING | Age: 89
Discharge: HOME | End: 2025-08-06
Attending: INTERNAL MEDICINE
Payer: MEDICARE

## 2025-08-04 DIAGNOSIS — C57.7 MALIGNANT NEOPLASM OF OTHER SPECIFIED FEMALE GENITAL ORGANS (HCC): ICD-10-CM

## 2025-08-04 DIAGNOSIS — R97.1 ELEVATED CANCER ANTIGEN 125 (CA 125): ICD-10-CM

## 2025-08-04 DIAGNOSIS — C56.9 OVARY CANCER, UNSPECIFIED LATERALITY (HCC): ICD-10-CM

## 2025-08-04 PROCEDURE — 74177 CT ABD & PELVIS W/CONTRAST: CPT

## 2025-08-04 PROCEDURE — 6360000004 HC RX CONTRAST MEDICATION: Performed by: INTERNAL MEDICINE

## 2025-08-04 RX ORDER — IOPAMIDOL 755 MG/ML
100 INJECTION, SOLUTION INTRAVASCULAR
Status: COMPLETED | OUTPATIENT
Start: 2025-08-04 | End: 2025-08-04

## 2025-08-04 RX ADMIN — IOPAMIDOL 100 ML: 755 INJECTION, SOLUTION INTRAVENOUS at 14:27

## 2025-08-13 ENCOUNTER — OFFICE VISIT (OUTPATIENT)
Dept: ONCOLOGY | Age: 89
End: 2025-08-13
Payer: MEDICARE

## 2025-08-13 ENCOUNTER — HOSPITAL ENCOUNTER (OUTPATIENT)
Dept: LAB | Age: 89
Discharge: HOME | End: 2025-08-13
Payer: MEDICARE

## 2025-08-13 VITALS
RESPIRATION RATE: 26 BRPM | HEART RATE: 80 BPM | TEMPERATURE: 98.5 F | DIASTOLIC BLOOD PRESSURE: 76 MMHG | WEIGHT: 182 LBS | SYSTOLIC BLOOD PRESSURE: 159 MMHG | BODY MASS INDEX: 33.49 KG/M2 | OXYGEN SATURATION: 95 % | HEIGHT: 62 IN

## 2025-08-13 DIAGNOSIS — C56.9 OVARY CANCER, UNSPECIFIED LATERALITY (HCC): Primary | ICD-10-CM

## 2025-08-13 DIAGNOSIS — C57.7 MALIGNANT NEOPLASM OF OTHER SPECIFIED FEMALE GENITAL ORGANS (HCC): ICD-10-CM

## 2025-08-13 DIAGNOSIS — R97.1 ELEVATED CANCER ANTIGEN 125 (CA 125): ICD-10-CM

## 2025-08-13 DIAGNOSIS — C56.9 OVARY CANCER, UNSPECIFIED LATERALITY (HCC): ICD-10-CM

## 2025-08-13 LAB
ALBUMIN SERPL-MCNC: 3.9 G/DL (ref 3.2–4.6)
ALBUMIN/GLOB SERPL: 1 (ref 1–1.9)
ALP SERPL-CCNC: 125 U/L (ref 35–104)
ALT SERPL-CCNC: 17 U/L (ref 8–45)
ANION GAP SERPL CALC-SCNC: 15 MMOL/L (ref 7–16)
AST SERPL-CCNC: 26 U/L (ref 15–37)
BASOPHILS # BLD: 0.05 K/UL (ref 0–0.2)
BASOPHILS NFR BLD: 0.5 % (ref 0–2)
BILIRUB SERPL-MCNC: 0.7 MG/DL (ref 0–1.2)
BUN SERPL-MCNC: 19 MG/DL (ref 8–23)
CALCIUM SERPL-MCNC: 10 MG/DL (ref 8.8–10.2)
CANCER AG125 SERPL-ACNC: 1886 U/ML (ref 0–38)
CHLORIDE SERPL-SCNC: 98 MMOL/L (ref 98–107)
CO2 SERPL-SCNC: 23 MMOL/L (ref 20–29)
CREAT SERPL-MCNC: 1.51 MG/DL (ref 0.6–1.1)
DIFFERENTIAL METHOD BLD: ABNORMAL
EOSINOPHIL # BLD: 0.16 K/UL (ref 0–0.8)
EOSINOPHIL NFR BLD: 1.6 % (ref 0.5–7.8)
ERYTHROCYTE [DISTWIDTH] IN BLOOD BY AUTOMATED COUNT: 18.5 % (ref 11.9–14.6)
GLOBULIN SER CALC-MCNC: 3.9 G/DL (ref 2.3–3.5)
GLUCOSE SERPL-MCNC: 85 MG/DL (ref 70–99)
HCT VFR BLD AUTO: 40 % (ref 35.8–46.3)
HGB BLD-MCNC: 13.3 G/DL (ref 11.7–15.4)
IMM GRANULOCYTES # BLD AUTO: 0.11 K/UL (ref 0–0.5)
IMM GRANULOCYTES NFR BLD AUTO: 1.1 % (ref 0–5)
LYMPHOCYTES # BLD: 2.05 K/UL (ref 0.5–4.6)
LYMPHOCYTES NFR BLD: 21 % (ref 13–44)
MCH RBC QN AUTO: 35.4 PG (ref 26.1–32.9)
MCHC RBC AUTO-ENTMCNC: 33.3 G/DL (ref 31.4–35)
MCV RBC AUTO: 106.4 FL (ref 82–102)
MONOCYTES # BLD: 0.88 K/UL (ref 0.1–1.3)
MONOCYTES NFR BLD: 9 % (ref 4–12)
NEUTS SEG # BLD: 6.5 K/UL (ref 1.7–8.2)
NEUTS SEG NFR BLD: 66.8 % (ref 43–78)
NRBC # BLD: 0.02 K/UL (ref 0–0.2)
PLATELET # BLD AUTO: 204 K/UL (ref 150–450)
PMV BLD AUTO: 9.3 FL (ref 9.4–12.3)
POTASSIUM SERPL-SCNC: 4.5 MMOL/L (ref 3.5–5.1)
PROT SERPL-MCNC: 7.8 G/DL (ref 6.3–8.2)
RBC # BLD AUTO: 3.76 M/UL (ref 4.05–5.2)
SODIUM SERPL-SCNC: 136 MMOL/L (ref 136–145)
WBC # BLD AUTO: 9.8 K/UL (ref 4.3–11.1)

## 2025-08-13 PROCEDURE — 1126F AMNT PAIN NOTED NONE PRSNT: CPT | Performed by: INTERNAL MEDICINE

## 2025-08-13 PROCEDURE — 85025 COMPLETE CBC W/AUTO DIFF WBC: CPT

## 2025-08-13 PROCEDURE — G8428 CUR MEDS NOT DOCUMENT: HCPCS | Performed by: INTERNAL MEDICINE

## 2025-08-13 PROCEDURE — 99214 OFFICE O/P EST MOD 30 MIN: CPT | Performed by: INTERNAL MEDICINE

## 2025-08-13 PROCEDURE — 1123F ACP DISCUSS/DSCN MKR DOCD: CPT | Performed by: INTERNAL MEDICINE

## 2025-08-13 PROCEDURE — 36415 COLL VENOUS BLD VENIPUNCTURE: CPT

## 2025-08-13 PROCEDURE — 80053 COMPREHEN METABOLIC PANEL: CPT

## 2025-08-13 PROCEDURE — 1036F TOBACCO NON-USER: CPT | Performed by: INTERNAL MEDICINE

## 2025-08-13 PROCEDURE — 1090F PRES/ABSN URINE INCON ASSESS: CPT | Performed by: INTERNAL MEDICINE

## 2025-08-13 PROCEDURE — G8417 CALC BMI ABV UP PARAM F/U: HCPCS | Performed by: INTERNAL MEDICINE

## 2025-08-13 PROCEDURE — 86304 IMMUNOASSAY TUMOR CA 125: CPT

## 2025-08-13 ASSESSMENT — PATIENT HEALTH QUESTIONNAIRE - PHQ9
SUM OF ALL RESPONSES TO PHQ QUESTIONS 1-9: 0
1. LITTLE INTEREST OR PLEASURE IN DOING THINGS: NOT AT ALL
2. FEELING DOWN, DEPRESSED OR HOPELESS: NOT AT ALL
SUM OF ALL RESPONSES TO PHQ QUESTIONS 1-9: 0

## 2025-08-25 ENCOUNTER — HOSPITAL ENCOUNTER (OUTPATIENT)
Dept: CT IMAGING | Age: 89
Discharge: HOME | End: 2025-08-27
Attending: INTERNAL MEDICINE
Payer: MEDICARE

## 2025-08-25 ENCOUNTER — RESULTS FOLLOW-UP (OUTPATIENT)
Dept: ONCOLOGY | Age: 89
End: 2025-08-25

## 2025-08-25 VITALS
HEIGHT: 62 IN | BODY MASS INDEX: 33.49 KG/M2 | OXYGEN SATURATION: 88 % | HEART RATE: 77 BPM | DIASTOLIC BLOOD PRESSURE: 69 MMHG | SYSTOLIC BLOOD PRESSURE: 154 MMHG | WEIGHT: 182 LBS | RESPIRATION RATE: 20 BRPM | TEMPERATURE: 97.7 F

## 2025-08-25 DIAGNOSIS — C57.7 MALIGNANT NEOPLASM OF OTHER SPECIFIED FEMALE GENITAL ORGANS (HCC): ICD-10-CM

## 2025-08-25 DIAGNOSIS — C54.9 MIXED MULLERIAN TUMOR (HCC): ICD-10-CM

## 2025-08-25 DIAGNOSIS — C56.9 OVARY CANCER, UNSPECIFIED LATERALITY (HCC): ICD-10-CM

## 2025-08-25 DIAGNOSIS — R97.1 ELEVATED CANCER ANTIGEN 125 (CA 125): ICD-10-CM

## 2025-08-25 DIAGNOSIS — C56.9 OVARY CANCER, UNSPECIFIED LATERALITY (HCC): Primary | ICD-10-CM

## 2025-08-25 PROCEDURE — 2709999900 CT BIOPSY ABDOMEN RETROPERITONEUM

## 2025-08-25 PROCEDURE — 99152 MOD SED SAME PHYS/QHP 5/>YRS: CPT | Performed by: RADIOLOGY

## 2025-08-25 PROCEDURE — 88307 TISSUE EXAM BY PATHOLOGIST: CPT

## 2025-08-25 PROCEDURE — 6360000002 HC RX W HCPCS: Performed by: RADIOLOGY

## 2025-08-25 PROCEDURE — 77012 CT SCAN FOR NEEDLE BIOPSY: CPT | Performed by: RADIOLOGY

## 2025-08-25 PROCEDURE — 6360000002 HC RX W HCPCS: Performed by: PHYSICIAN ASSISTANT

## 2025-08-25 PROCEDURE — 49180 BIOPSY ABDOMINAL MASS: CPT | Performed by: RADIOLOGY

## 2025-08-25 PROCEDURE — 99152 MOD SED SAME PHYS/QHP 5/>YRS: CPT

## 2025-08-25 PROCEDURE — 88305 TISSUE EXAM BY PATHOLOGIST: CPT

## 2025-08-25 RX ORDER — LIDOCAINE HYDROCHLORIDE 10 MG/ML
INJECTION, SOLUTION EPIDURAL; INFILTRATION; INTRACAUDAL; PERINEURAL PRN
Status: COMPLETED | OUTPATIENT
Start: 2025-08-25 | End: 2025-08-25

## 2025-08-25 RX ORDER — HEPARIN 100 UNIT/ML
SYRINGE INTRAVENOUS PRN
Status: COMPLETED | OUTPATIENT
Start: 2025-08-25 | End: 2025-08-25

## 2025-08-25 RX ORDER — FENTANYL CITRATE 50 UG/ML
INJECTION, SOLUTION INTRAMUSCULAR; INTRAVENOUS PRN
Status: COMPLETED | OUTPATIENT
Start: 2025-08-25 | End: 2025-08-25

## 2025-08-25 RX ORDER — MIDAZOLAM HYDROCHLORIDE 1 MG/ML
INJECTION, SOLUTION INTRAMUSCULAR; INTRAVENOUS PRN
Status: COMPLETED | OUTPATIENT
Start: 2025-08-25 | End: 2025-08-25

## 2025-08-25 RX ADMIN — HEPARIN 500 UNITS: 100 SYRINGE at 11:17

## 2025-08-25 RX ADMIN — LIDOCAINE HYDROCHLORIDE 5 ML: 10 INJECTION, SOLUTION EPIDURAL; INFILTRATION; INTRACAUDAL; PERINEURAL at 10:01

## 2025-08-25 RX ADMIN — MIDAZOLAM 0.5 MG: 1 INJECTION INTRAMUSCULAR; INTRAVENOUS at 10:01

## 2025-08-25 RX ADMIN — FENTANYL CITRATE 25 MCG: 50 INJECTION, SOLUTION INTRAMUSCULAR; INTRAVENOUS at 09:50

## 2025-08-25 RX ADMIN — FENTANYL CITRATE 25 MCG: 50 INJECTION, SOLUTION INTRAMUSCULAR; INTRAVENOUS at 09:55

## 2025-08-25 RX ADMIN — MIDAZOLAM 0.5 MG: 1 INJECTION INTRAMUSCULAR; INTRAVENOUS at 09:50

## 2025-08-25 RX ADMIN — FENTANYL CITRATE 25 MCG: 50 INJECTION, SOLUTION INTRAMUSCULAR; INTRAVENOUS at 10:01

## 2025-08-25 RX ADMIN — MIDAZOLAM 0.5 MG: 1 INJECTION INTRAMUSCULAR; INTRAVENOUS at 09:55

## 2025-08-25 ASSESSMENT — PAIN SCALES - GENERAL
PAINLEVEL_OUTOF10: 0